# Patient Record
Sex: MALE | Race: WHITE | NOT HISPANIC OR LATINO | Employment: OTHER | URBAN - METROPOLITAN AREA
[De-identification: names, ages, dates, MRNs, and addresses within clinical notes are randomized per-mention and may not be internally consistent; named-entity substitution may affect disease eponyms.]

---

## 2017-01-09 DIAGNOSIS — C18.9 MALIGNANT NEOPLASM OF COLON, UNSPECIFIED PART OF COLON (HCC): ICD-10-CM

## 2017-01-09 DIAGNOSIS — E11.9 CONTROLLED TYPE 2 DIABETES MELLITUS WITHOUT COMPLICATION, WITHOUT LONG-TERM CURRENT USE OF INSULIN (HCC): Primary | ICD-10-CM

## 2017-01-09 DIAGNOSIS — E78.2 MIXED HYPERLIPIDEMIA: ICD-10-CM

## 2017-01-09 DIAGNOSIS — I10 ESSENTIAL HYPERTENSION: ICD-10-CM

## 2017-01-11 LAB
ALBUMIN SERPL-MCNC: 4.2 G/DL (ref 3.5–5.2)
ALBUMIN/GLOB SERPL: 1.7 G/DL
ALP SERPL-CCNC: 67 U/L (ref 39–117)
ALT SERPL-CCNC: 25 U/L (ref 1–41)
AST SERPL-CCNC: 21 U/L (ref 1–40)
BASOPHILS # BLD AUTO: 0.01 10*3/MM3 (ref 0–0.2)
BASOPHILS NFR BLD AUTO: 0.1 % (ref 0–1.5)
BILIRUB SERPL-MCNC: 0.6 MG/DL (ref 0.1–1.2)
BUN SERPL-MCNC: 27 MG/DL (ref 8–23)
BUN/CREAT SERPL: 23.3 (ref 7–25)
CALCIUM SERPL-MCNC: 9.2 MG/DL (ref 8.6–10.5)
CHLORIDE SERPL-SCNC: 105 MMOL/L (ref 98–107)
CHOLEST SERPL-MCNC: 179 MG/DL (ref 0–200)
CO2 SERPL-SCNC: 22.5 MMOL/L (ref 22–29)
CREAT SERPL-MCNC: 1.16 MG/DL (ref 0.76–1.27)
EOSINOPHIL # BLD AUTO: 0.14 10*3/MM3 (ref 0–0.7)
EOSINOPHIL NFR BLD AUTO: 1.9 % (ref 0.3–6.2)
ERYTHROCYTE [DISTWIDTH] IN BLOOD BY AUTOMATED COUNT: 13.3 % (ref 11.5–14.5)
GLOBULIN SER CALC-MCNC: 2.5 GM/DL
GLUCOSE SERPL-MCNC: 110 MG/DL (ref 65–99)
HBA1C MFR BLD: 5.9 % (ref 4.8–5.6)
HCT VFR BLD AUTO: 41.3 % (ref 40.4–52.2)
HDLC SERPL-MCNC: 43 MG/DL (ref 40–60)
HGB BLD-MCNC: 13.6 G/DL (ref 13.7–17.6)
IMM GRANULOCYTES # BLD: 0 10*3/MM3 (ref 0–0.03)
IMM GRANULOCYTES NFR BLD: 0 % (ref 0–0.5)
LDLC SERPL CALC-MCNC: 121 MG/DL (ref 0–100)
LDLC/HDLC SERPL: 2.82 {RATIO}
LYMPHOCYTES # BLD AUTO: 3.07 10*3/MM3 (ref 0.9–4.8)
LYMPHOCYTES NFR BLD AUTO: 40.8 % (ref 19.6–45.3)
MCH RBC QN AUTO: 30.7 PG (ref 27–32.7)
MCHC RBC AUTO-ENTMCNC: 32.9 G/DL (ref 32.6–36.4)
MCV RBC AUTO: 93.2 FL (ref 79.8–96.2)
MONOCYTES # BLD AUTO: 0.37 10*3/MM3 (ref 0.2–1.2)
MONOCYTES NFR BLD AUTO: 4.9 % (ref 5–12)
NEUTROPHILS # BLD AUTO: 3.93 10*3/MM3 (ref 1.9–8.1)
NEUTROPHILS NFR BLD AUTO: 52.3 % (ref 42.7–76)
PLATELET # BLD AUTO: 281 10*3/MM3 (ref 140–500)
POTASSIUM SERPL-SCNC: 4.8 MMOL/L (ref 3.5–5.2)
PROT SERPL-MCNC: 6.7 G/DL (ref 6–8.5)
RBC # BLD AUTO: 4.43 10*6/MM3 (ref 4.6–6)
SODIUM SERPL-SCNC: 143 MMOL/L (ref 136–145)
TRIGL SERPL-MCNC: 74 MG/DL (ref 0–150)
VLDLC SERPL CALC-MCNC: 14.8 MG/DL (ref 5–40)
WBC # BLD AUTO: 7.52 10*3/MM3 (ref 4.5–10.7)

## 2017-01-19 ENCOUNTER — OFFICE VISIT (OUTPATIENT)
Dept: INTERNAL MEDICINE | Facility: CLINIC | Age: 72
End: 2017-01-19

## 2017-01-19 VITALS
RESPIRATION RATE: 12 BRPM | DIASTOLIC BLOOD PRESSURE: 80 MMHG | BODY MASS INDEX: 36.45 KG/M2 | HEIGHT: 73 IN | HEART RATE: 69 BPM | WEIGHT: 275 LBS | SYSTOLIC BLOOD PRESSURE: 130 MMHG | TEMPERATURE: 98.4 F | OXYGEN SATURATION: 97 %

## 2017-01-19 DIAGNOSIS — E55.9 VITAMIN D DEFICIENCY: ICD-10-CM

## 2017-01-19 DIAGNOSIS — R01.1 MURMUR, CARDIAC: ICD-10-CM

## 2017-01-19 DIAGNOSIS — I87.2 VENOUS STASIS DERMATITIS OF BOTH LOWER EXTREMITIES: ICD-10-CM

## 2017-01-19 DIAGNOSIS — E66.01 MORBID OBESITY DUE TO EXCESS CALORIES (HCC): ICD-10-CM

## 2017-01-19 DIAGNOSIS — I10 ESSENTIAL HYPERTENSION: ICD-10-CM

## 2017-01-19 DIAGNOSIS — E11.9 CONTROLLED TYPE 2 DIABETES MELLITUS WITHOUT COMPLICATION, WITHOUT LONG-TERM CURRENT USE OF INSULIN (HCC): ICD-10-CM

## 2017-01-19 DIAGNOSIS — E78.2 MIXED HYPERLIPIDEMIA: ICD-10-CM

## 2017-01-19 DIAGNOSIS — Z00.00 HEALTHCARE MAINTENANCE: Primary | ICD-10-CM

## 2017-01-19 DIAGNOSIS — C18.9 MALIGNANT NEOPLASM OF COLON, UNSPECIFIED PART OF COLON (HCC): ICD-10-CM

## 2017-01-19 DIAGNOSIS — C44.92 SQUAMOUS CELL CARCINOMA OF SKIN: ICD-10-CM

## 2017-01-19 PROCEDURE — 99214 OFFICE O/P EST MOD 30 MIN: CPT | Performed by: INTERNAL MEDICINE

## 2017-01-19 PROCEDURE — G0009 ADMIN PNEUMOCOCCAL VACCINE: HCPCS | Performed by: INTERNAL MEDICINE

## 2017-01-19 PROCEDURE — G0439 PPPS, SUBSEQ VISIT: HCPCS | Performed by: INTERNAL MEDICINE

## 2017-01-19 PROCEDURE — 90670 PCV13 VACCINE IM: CPT | Performed by: INTERNAL MEDICINE

## 2017-01-19 NOTE — MR AVS SNAPSHOT
Dileep HUFF Alfonso   1/19/2017 1:00 PM   Office Visit    Dept Phone:  [phone]   Encounter #:  [id number]    Provider:  Juan A Rubi MD   Department:  [facility] INTERNAL MEDICINE                Your Full Care Plan              Your Updated Medication List          This list is accurate as of: 1/19/17  2:24 PM.  Always use your most recent med list.                aspirin 81 MG tablet       CoQ-10 100 MG capsule       fish oil 1000 MG capsule capsule       losartan 100 MG tablet   Commonly known as:  COZAAR   TAKE ONE TABLET BY MOUTH ONCE DAILY       MULTI VITAMIN DAILY PO       Vitamin D-3 1000 UNITS capsule               We Performed the Following     Pneumococcal Conjugate Vaccine 13-Valent All       You Were Diagnosed With        Codes Comments    Healthcare maintenance    -  Primary ICD-10-CM: Z00.00  ICD-9-CM: V70.0     Controlled type 2 diabetes mellitus without complication, without long-term current use of insulin     ICD-10-CM: E11.9  ICD-9-CM: 250.00     Mixed hyperlipidemia     ICD-10-CM: E78.2  ICD-9-CM: 272.2     Essential hypertension     ICD-10-CM: I10  ICD-9-CM: 401.9     Malignant neoplasm of colon, unspecified part of colon     ICD-10-CM: C18.9  ICD-9-CM: 153.9     Morbid obesity due to excess calories     ICD-10-CM: E66.01  ICD-9-CM: 278.01     Murmur, cardiac     ICD-10-CM: R01.1  ICD-9-CM: 785.2     Squamous cell carcinoma of skin     ICD-10-CM: C44.92  ICD-9-CM: 173.92     Venous stasis dermatitis of both lower extremities     ICD-10-CM: I83.11, I83.12  ICD-9-CM: 454.1     Vitamin D deficiency     ICD-10-CM: E55.9  ICD-9-CM: 268.9       Instructions     None    Patient Instructions History      Upcoming Appointments     Visit Type Date Time Department    SUBSEQUENT MEDICARE WELLNESS 1/19/2017  1:00 PM [facility]    [facility] 6/12/2017  9:40 AM [facility]    OFFICE VISIT 6/19/2017  8:00 AM [facility]      MyChart Signup     Our records  "indicate that your [facility] [employer] account has been deactivated. If you would like to reactivate your account, please email [email or web address] or call [phone] to talk to our [employer] staff.             Other Info from Your Visit           Your Appointments     Jun 12, 2017  9:40 AM EDT   [facility] with [facility]CAMO   [facility] INTERNAL MEDICINE (--)    [address] [facility]. [address] [place] [id number] [phone]            Jun 19, 2017  8:00 AM EDT   Office Visit with Juan A Rubi MD   [facility] INTERNAL MEDICINE (--)    [address] [facility]. [address] [place] [id number] [phone]           Arrive 15 minutes prior to appointment.              Allergies     Statins        Reason for Visit     Annual Exam review of medical issues       Vital Signs     Blood Pressure Pulse Temperature Respirations Height Weight    130/80 69 98.4 °F (36.9 °C) 12 73\" (185.4 cm) 275 lb (125 kg)    Oxygen Saturation Body Mass Index Smoking Status             97% 36.28 kg/m2 Never Smoker         Problems and Diagnoses Noted     Type 2 diabetes mellitus, controlled    Routine medical exam    High cholesterol or triglycerides    High blood pressure    Lung mass    Colon cancer    Severe obesity    Murmur, cardiac    Squamous cell skin cancer    Stasis dermatitis    Vitamin D deficiency      No Longer an Issue     History of cancer of rectum, rectosigmoid junction, and anus      Immunizations Administered     Name Date    Pneumococcal Conjugate 13-Valent         "

## 2017-01-19 NOTE — PROGRESS NOTES
"Annual Exam (review of medical issues )      HPI  Dileep Hamilton is a 71 y.o. male RTC In yearly AWV, review of medical issues:  Has been busy with wife's mother's passing in [facility].  Feels like has been struggling with weight.  Gained some weight over holidays but has \"nearly lost it all\".    1. HTN - on one drug.  Numbers at home similar to today's numbers.   2. HLD - intolerant to Pravastatin with CoQ10 and Livalo was $240/ month. Diet was off a bit over holidays, but is better now.    3. DMII with long hx obesity - has been off meds with prior diet controlled. Is at gym 3x/ week.     4. Colon CA - Stage I, s/p R colon resection in 12/2014. Had CT and labs done prior to 11/2016 visit. All stable and no f/u needed for one year.  Repeat C-scope due and pt to call Dr. Sin's office.   5. Aortic calcification - noted on ECHO..   6. SCC of skin - saw derm, 11/2016. No recurrence. F/U in one year.   7. Stasis dermatitis, mild - no sx or pain. Uses compression during day most days.  Getting \"a little bit\" of swelling.   8. Vitamin D deficiency - still on  OTC supplement.    Review of Systems   Constitution: Negative for chills, fever, malaise/fatigue and weight loss.   HENT: Negative for congestion, headaches, hearing loss, hoarse voice, odynophagia and sore throat.    Eyes: Negative for discharge, double vision, pain and redness.        Last eye exam ~9/2016; wears glasses      Cardiovascular: Positive for leg swelling (minimal at times). Negative for chest pain, dyspnea on exertion, irregular heartbeat, near-syncope, palpitations and syncope.   Respiratory: Negative for cough, shortness of breath, sleep disturbances due to breathing and snoring (in past when heavier. ).    Skin: Negative for rash and suspicious lesions.   Musculoskeletal: Negative for joint pain, joint swelling, muscle cramps and muscle weakness.   Gastrointestinal: Negative for constipation, diarrhea, dysphagia, heartburn, nausea and " vomiting.   Genitourinary: Negative for dysuria, frequency, hematuria, nocturia and urgency.   Neurological: Negative for dizziness and light-headedness.   Psychiatric/Behavioral: Negative for depression. The patient does not have insomnia and is not nervous/anxious.        Problem List:    Patient Active Problem List   Diagnosis   • Malignant neoplasm of colon   • Hypertension   • Diabetes type 2, controlled   • Vitamin D deficiency   • Hyperlipidemia   • Venous stasis dermatitis   • Morbid obesity due to excess calories   • Squamous cell carcinoma of skin   • Murmur, cardiac   • Lung mass   • Healthcare maintenance       Medical History:    Past Medical History   Diagnosis Date   • Colon cancer      T1N0 stage I, s/p partial R colon resection 2014   • History of cataract      B early 2014   • History of shingles      2013   • Hyperlipidemia    • Hypertension      dx'd 8/2014   • IFG (impaired fasting glucose)    • Leukocytosis    • Obesity    • Personal history of scoliosis      mild   • Proteinuria    • Squamous cell carcinoma      skin   • Stasis dermatitis    • Type 2 diabetes mellitus      new dx 10/2015   • Vitamin D deficiency         Social History:    Social History     Social History   • Marital status:      Spouse name: N/A   • Number of children: 1   • Years of education: N/A     Occupational History   • Retired respiratory therapist      Social History Main Topics   • Smoking status: Never Smoker   • Smokeless tobacco: Never Used   • Alcohol use No   • Drug use: No   • Sexual activity: Yes     Partners: Female      Comment: wife only; no hx STD's     Other Topics Concern   • Not on file     Social History Narrative    Diet - variable    Exercise - gym 3x/ week.         Family History:   Family History   Problem Relation Age of Onset   • Heart disease Mother    • Diabetes Mother    • Hypertension Mother    • Lung cancer Mother    • COPD Mother    • Stroke Father    • Diabetes Maternal Grandmother     • Diabetes type II Sister    • Diabetes type II Brother        Surgical History:   Past Surgical History   Procedure Laterality Date   • Vasectomy       1978   • Subtotal colectomy  12/05/2014     colon ca 2014; R side of colon only         Current Outpatient Prescriptions:   •  aspirin 81 MG tablet, Take  by mouth daily., Disp: , Rfl:   •  Cholecalciferol (VITAMIN D-3) 1000 UNITS capsule, Take  by mouth daily., Disp: , Rfl:   •  Coenzyme Q10 (COQ-10) 100 MG capsule, Take  by mouth., Disp: , Rfl:   •  losartan (COZAAR) 100 MG tablet, TAKE ONE TABLET BY MOUTH ONCE DAILY, Disp: 90 tablet, Rfl: 2  •  Multiple Vitamin (MULTI VITAMIN DAILY PO), Take  by mouth., Disp: , Rfl:   •  Omega-3 Fatty Acids (FISH OIL) 1000 MG capsule capsule, Take  by mouth., Disp: , Rfl:     Vitals:    01/19/17 1301   BP: 130/80   Pulse: 69   Resp: 12   Temp: 98.4 °F (36.9 °C)   SpO2: 97%       Physical Exam   Constitutional: He is oriented to person, place, and time. He appears well-developed and well-nourished. He is cooperative. He does not have a sickly appearance. He does not appear ill. No distress.   Obese habitus     HENT:   Head: Normocephalic and atraumatic.   Right Ear: Hearing, tympanic membrane, external ear and ear canal normal.   Left Ear: Hearing, tympanic membrane, external ear and ear canal normal.   Nose: Nose normal.   Mouth/Throat: Uvula is midline, oropharynx is clear and moist and mucous membranes are normal.   Eyes: Conjunctivae, EOM and lids are normal. Pupils are equal, round, and reactive to light.   Neck: Normal range of motion and full passive range of motion without pain. Neck supple. Carotid bruit is not present. No thyroid mass and no thyromegaly present.   Cardiovascular: Normal rate, regular rhythm, S1 normal, S2 normal and intact distal pulses.  Exam reveals no gallop and no friction rub.    Murmur (2/6 JULY RUSB) heard.  Pulses:       Radial pulses are 2+ on the right side, and 2+ on the left side.         Dorsalis pedis pulses are 2+ on the right side, and 2+ on the left side.        Posterior tibial pulses are 2+ on the right side, and 2+ on the left side.   Pulmonary/Chest: Effort normal and breath sounds normal. No respiratory distress. He has no wheezes. He has no rhonchi. He has no rales.   Abdominal: Soft. Bowel sounds are normal. He exhibits no distension and no mass. There is no hepatosplenomegaly. There is no tenderness. There is no rebound and no guarding.   Obese     Genitourinary: Rectum normal and prostate normal. Prostate is not enlarged and not tender.   Musculoskeletal: Normal range of motion. He exhibits edema (trace).    Dileep had a diabetic foot exam performed today.   During the foot exam he had a monofilament test performed (intact).    Vascular Status -  His exam exhibits right foot vasculature abnormal and no right foot edema. His exam exhibits left foot vasculature abnormal and no left foot edema.  Lymphadenopathy:     He has no cervical adenopathy.     He has no axillary adenopathy.        Right: No inguinal adenopathy present.        Left: No inguinal adenopathy present.   Neurological: He is alert and oriented to person, place, and time. He has normal strength and normal reflexes. He displays no tremor. No cranial nerve deficit or sensory deficit. He exhibits normal muscle tone. Gait normal.   Skin: Skin is warm, dry and intact. No rash noted.   Scattered moles, nevi and skin tags noted.   SK's noted on back.    Psychiatric: He has a normal mood and affect. His speech is normal and behavior is normal. Cognition and memory are normal.   Vitals reviewed.      Assessment/ Plan  Diagnoses and all orders for this visit:    Healthcare maintenance  -     Pneumococcal Conjugate Vaccine 13-Valent All    Controlled type 2 diabetes mellitus without complication, without long-term current use of insulin    Mixed hyperlipidemia    Essential hypertension    Malignant neoplasm of colon, unspecified part  of colon    Morbid obesity due to excess calories    Murmur, cardiac    Squamous cell carcinoma of skin    Venous stasis dermatitis of both lower extremities    Vitamin D deficiency        Return in about 5 months (around 6/19/2017) for Recheck.      Discussion:  Dileep Hamilton is a 71 y.o. male RTC In yearly AWV, review of medical issues:     1. HTN - controlled on one drug.  BMP OK.   2. HLD - intolerant to Pravastatin with CoQ10 and Livalo was $240/ month. We discussed indication for statin medication, but without known vascular disease is reasonable to remain off statin.  Pt agreeable and will c/w TLC diet.     3. DMII with long hx obesity - A1C improved as pt improving diet and exercise.  C/W efforts. Optho UTD. Check Umicroalb/ Cr next labs.     4. Colon CA - Stage I, s/p R colon resection in 12/2014, with CT and labs done prior to 11/2016 visit. F/U Onc one year given clear CT.  Pt to schedule C-scope for f/u with Dr. Sin.   5. Aortic calcification without stenosis - noted on ECHO 2016 with audible RUSB murmur on exam. Monitor.    6. SCC of skin - s/p derm eval 11/2016 with no recurrence.  F/U one year.   7. Stasis dermatitis, mild - c/w compression daily.    8. Vitamin D deficiency - still on  OTC supplement, trend levels next labs.   9. HM - labs d/w pt; Flu/ Td/ Pvax - UTD; Prevnar - today; Zostavax - declines due to cost; C-scope due, pt to schedule; ONEL OK, check PSA next labs; Hep C Ab (-) 2014; c/w exercise.    RTC 5 months, F labs prior

## 2017-06-09 DIAGNOSIS — I10 ESSENTIAL HYPERTENSION: ICD-10-CM

## 2017-06-09 DIAGNOSIS — E78.2 MIXED HYPERLIPIDEMIA: ICD-10-CM

## 2017-06-09 DIAGNOSIS — Z12.5 SCREENING FOR PROSTATE CANCER: ICD-10-CM

## 2017-06-09 DIAGNOSIS — E55.9 VITAMIN D DEFICIENCY: ICD-10-CM

## 2017-06-09 DIAGNOSIS — E11.9 CONTROLLED TYPE 2 DIABETES MELLITUS WITHOUT COMPLICATION, WITHOUT LONG-TERM CURRENT USE OF INSULIN (HCC): Primary | ICD-10-CM

## 2017-08-03 LAB
25(OH)D3+25(OH)D2 SERPL-MCNC: 29.9 NG/ML (ref 30–100)
ALBUMIN SERPL-MCNC: 4.4 G/DL (ref 3.5–5.2)
ALBUMIN/CREAT UR: 24 MG/G CREAT (ref 0–30)
ALBUMIN/GLOB SERPL: 1.5 G/DL
ALP SERPL-CCNC: 79 U/L (ref 39–117)
ALT SERPL-CCNC: 23 U/L (ref 1–41)
AST SERPL-CCNC: 20 U/L (ref 1–40)
BILIRUB SERPL-MCNC: 0.7 MG/DL (ref 0.1–1.2)
BUN SERPL-MCNC: 30 MG/DL (ref 8–23)
BUN/CREAT SERPL: 23.3 (ref 7–25)
CALCIUM SERPL-MCNC: 9.3 MG/DL (ref 8.6–10.5)
CHLORIDE SERPL-SCNC: 103 MMOL/L (ref 98–107)
CO2 SERPL-SCNC: 26.4 MMOL/L (ref 22–29)
CREAT SERPL-MCNC: 1.29 MG/DL (ref 0.76–1.27)
CREAT UR-MCNC: 141.7 MG/DL
GLOBULIN SER CALC-MCNC: 2.9 GM/DL
GLUCOSE SERPL-MCNC: 121 MG/DL (ref 65–99)
HBA1C MFR BLD: 6.23 % (ref 4.8–5.6)
MICROALBUMIN UR-MCNC: 34 UG/ML
POTASSIUM SERPL-SCNC: 4.7 MMOL/L (ref 3.5–5.2)
PROT SERPL-MCNC: 7.3 G/DL (ref 6–8.5)
PSA SERPL-MCNC: 1.85 NG/ML (ref 0–4)
SODIUM SERPL-SCNC: 143 MMOL/L (ref 136–145)

## 2017-08-09 ENCOUNTER — OFFICE VISIT (OUTPATIENT)
Dept: INTERNAL MEDICINE | Facility: CLINIC | Age: 72
End: 2017-08-09

## 2017-08-09 VITALS
OXYGEN SATURATION: 98 % | SYSTOLIC BLOOD PRESSURE: 130 MMHG | WEIGHT: 281 LBS | TEMPERATURE: 98.8 F | HEIGHT: 73 IN | HEART RATE: 58 BPM | BODY MASS INDEX: 37.24 KG/M2 | DIASTOLIC BLOOD PRESSURE: 72 MMHG

## 2017-08-09 DIAGNOSIS — E66.01 MORBID OBESITY DUE TO EXCESS CALORIES (HCC): ICD-10-CM

## 2017-08-09 DIAGNOSIS — E11.9 CONTROLLED TYPE 2 DIABETES MELLITUS WITHOUT COMPLICATION, WITHOUT LONG-TERM CURRENT USE OF INSULIN (HCC): ICD-10-CM

## 2017-08-09 DIAGNOSIS — C18.9 MALIGNANT NEOPLASM OF COLON, UNSPECIFIED PART OF COLON (HCC): Primary | ICD-10-CM

## 2017-08-09 DIAGNOSIS — E55.9 VITAMIN D DEFICIENCY: ICD-10-CM

## 2017-08-09 DIAGNOSIS — E78.2 MIXED HYPERLIPIDEMIA: ICD-10-CM

## 2017-08-09 DIAGNOSIS — I10 ESSENTIAL HYPERTENSION: ICD-10-CM

## 2017-08-09 PROBLEM — I35.9 AORTIC VALVE CALCIFICATION: Status: ACTIVE | Noted: 2017-08-09

## 2017-08-09 PROCEDURE — 99214 OFFICE O/P EST MOD 30 MIN: CPT | Performed by: INTERNAL MEDICINE

## 2017-08-09 NOTE — PROGRESS NOTES
"Hyperlipidemia; Hypertension; and Labs Only      HPI  Dileep Hamilton is a 72 y.o. male RTC in f/u: Has been doing well but has been to numerous funerals recently of friends, so has been a tough summer.   1. HTN -on one drug.  Getting good nubmers at home.   2. HLD - still hesitant about statin given past side effects. Really nothing has changed and is not moving toward wanting to retrial statin currently.    3. DMII with long hx obesity - has not been to the gym in a while.  Has been off diet and knows weight is up.  \"I gotta stop, I gotta go back to the gym\".  Recently bought 3 wheel recumbent bikes and has started riding with wife. Notes he and his wife \"sat down and had a good discussion\" about dietary mods and needed changes.      4. Colon CA - Stage I, s/p R colon resection in 12/2014, with CT and labs done prior to 11/2016 visit. Has not made appt yet. \"I need to call Dr. Sin\".    5. Vitamin D deficiency - still on  OTC supplement, taking about 2000 I.U. Daily at this time.       Review of Systems   Constitution: Positive for weight gain.   Cardiovascular: Negative for chest pain and dyspnea on exertion.   Respiratory: Negative for shortness of breath.    Neurological: Negative for dizziness and light-headedness.       The following portions of the patient's history were reviewed and updated as appropriate: allergies, current medications, past medical history and problem list.      Current Outpatient Prescriptions:   •  aspirin 81 MG tablet, Take  by mouth daily., Disp: , Rfl:   •  Cholecalciferol (VITAMIN D-3) 1000 UNITS capsule, Take  by mouth daily., Disp: , Rfl:   •  Coenzyme Q10 (COQ-10) 100 MG capsule, Take  by mouth., Disp: , Rfl:   •  losartan (COZAAR) 100 MG tablet, TAKE ONE TABLET BY MOUTH ONCE DAILY, Disp: 90 tablet, Rfl: 2  •  Multiple Vitamin (MULTI VITAMIN DAILY PO), Take  by mouth., Disp: , Rfl:   •  Omega-3 Fatty Acids (FISH OIL) 1000 MG capsule capsule, Take  by mouth., Disp: , Rfl: " "    Vitals:    08/09/17 0821   BP: 130/72   Pulse: 58   Temp: 98.8 °F (37.1 °C)   SpO2: 98%   Weight: 281 lb (127 kg)   Height: 73\" (185.4 cm)         Physical Exam   Constitutional: He is oriented to person, place, and time. He appears well-developed and well-nourished. No distress.   Obese habitus     HENT:   Head: Normocephalic and atraumatic.   Eyes: Pupils are equal, round, and reactive to light.   Neck: Normal range of motion. Neck supple. Carotid bruit is not present.   Cardiovascular: Normal rate, regular rhythm and intact distal pulses.    Murmur heard.   Systolic (RUSB) murmur is present with a grade of 3/6   Pulses:       Carotid pulses are 2+ on the right side, and 2+ on the left side.       Radial pulses are 2+ on the right side, and 2+ on the left side.   Pulmonary/Chest: Effort normal and breath sounds normal. No respiratory distress. He has no wheezes. He has no rales.   Musculoskeletal: He exhibits edema (trace only).   Neurological: He is alert and oriented to person, place, and time. No cranial nerve deficit. He exhibits normal muscle tone. Gait normal.   Psychiatric: He has a normal mood and affect. His behavior is normal.   Vitals reviewed.      Assessment/ Plan  Diagnoses and all orders for this visit:    Malignant neoplasm of colon, unspecified part of colon    Essential hypertension    Controlled type 2 diabetes mellitus without complication, without long-term current use of insulin    Vitamin D deficiency    Mixed hyperlipidemia    Morbid obesity due to excess calories        Return in about 3 months (around 11/9/2017) for Recheck.      Discussion:  Dileep Hamilton is a 72 y.o. male RTC in f/u:   1. HTN - controlled on one drug. BMP OK (Cr listed as elevated, but is around pt baseline range).   2. HLD - intolerant to Pravastatin with CoQ10 and Livalo was $240/ month. Pt has no known vascular disease and prefers to remain off statin at this time.    3. DMII with long hx obesity - off diet and " exercise routine, but pt is in action phase of getting back to good dietary and exercise routines.  A1C up a bit, but does not require meds at this time.  Weight loss advised. Will trend A1C in 3 months instead of 6 months given planned TLC. Umicroalb/ Cr (-) on labs.     4. Colon CA - Stage I, s/p R colon resection in 12/2014. Follows with Onc yearly.  C-scope due and pt to call today and schedule with Dr. Sin.   5. HM - C-scope due, pt to schedule; ONEL OK last exam, PSA OK on labs today.      RTC 3 months, F labs prior

## 2017-08-21 RX ORDER — LOSARTAN POTASSIUM 100 MG/1
TABLET ORAL
Qty: 90 TABLET | Refills: 2 | Status: SHIPPED | OUTPATIENT
Start: 2017-08-21 | End: 2018-06-25 | Stop reason: SDUPTHER

## 2017-11-01 DIAGNOSIS — I10 ESSENTIAL HYPERTENSION: ICD-10-CM

## 2017-11-01 DIAGNOSIS — E11.9 CONTROLLED TYPE 2 DIABETES MELLITUS WITHOUT COMPLICATION, WITHOUT LONG-TERM CURRENT USE OF INSULIN (HCC): Primary | ICD-10-CM

## 2017-12-05 ENCOUNTER — OFFICE (AMBULATORY)
Dept: URBAN - METROPOLITAN AREA CLINIC 75 | Facility: CLINIC | Age: 72
End: 2017-12-05
Payer: MEDICARE

## 2017-12-05 VITALS
HEIGHT: 72 IN | WEIGHT: 278 LBS | DIASTOLIC BLOOD PRESSURE: 92 MMHG | SYSTOLIC BLOOD PRESSURE: 150 MMHG | HEART RATE: 68 BPM

## 2017-12-05 DIAGNOSIS — C18.9 MALIGNANT NEOPLASM OF COLON, UNSPECIFIED: ICD-10-CM

## 2017-12-05 DIAGNOSIS — Z12.11 ENCOUNTER FOR SCREENING FOR MALIGNANT NEOPLASM OF COLON: ICD-10-CM

## 2017-12-05 DIAGNOSIS — Z85.038 PERSONAL HISTORY OF OTHER MALIGNANT NEOPLASM OF LARGE INTEST: ICD-10-CM

## 2017-12-05 PROCEDURE — S0285 CNSLT BEFORE SCREEN COLONOSC: HCPCS | Performed by: INTERNAL MEDICINE

## 2017-12-05 NOTE — SERVICENOTES
The above note was scribed by Rita Barraza PA-C. Dr. Leon saw this patient and examined this patient while in the office.

## 2017-12-05 NOTE — SERVICEHPINOTES
Thank you for allowing me to participate in the care of this patient. Mr. 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 He underwent right colectomy with Dr. Mckeon at that time. There was no evidence metastatic disease and he was dx as Stage I.  He is followed by Dr. Valenzuela for this. Today he is doing well without complaints.

## 2018-03-08 VITALS
SYSTOLIC BLOOD PRESSURE: 93 MMHG | HEART RATE: 56 BPM | DIASTOLIC BLOOD PRESSURE: 43 MMHG | SYSTOLIC BLOOD PRESSURE: 181 MMHG | DIASTOLIC BLOOD PRESSURE: 56 MMHG | SYSTOLIC BLOOD PRESSURE: 82 MMHG | SYSTOLIC BLOOD PRESSURE: 115 MMHG | HEART RATE: 48 BPM | SYSTOLIC BLOOD PRESSURE: 151 MMHG | WEIGHT: 270 LBS | HEART RATE: 52 BPM | OXYGEN SATURATION: 98 % | SYSTOLIC BLOOD PRESSURE: 127 MMHG | HEART RATE: 44 BPM | DIASTOLIC BLOOD PRESSURE: 63 MMHG | HEART RATE: 47 BPM | DIASTOLIC BLOOD PRESSURE: 76 MMHG | DIASTOLIC BLOOD PRESSURE: 40 MMHG | HEART RATE: 43 BPM | DIASTOLIC BLOOD PRESSURE: 61 MMHG | RESPIRATION RATE: 14 BRPM | RESPIRATION RATE: 18 BRPM | RESPIRATION RATE: 21 BRPM | DIASTOLIC BLOOD PRESSURE: 52 MMHG | DIASTOLIC BLOOD PRESSURE: 35 MMHG | TEMPERATURE: 97.4 F | RESPIRATION RATE: 16 BRPM | DIASTOLIC BLOOD PRESSURE: 48 MMHG | DIASTOLIC BLOOD PRESSURE: 84 MMHG | SYSTOLIC BLOOD PRESSURE: 96 MMHG | HEART RATE: 50 BPM | RESPIRATION RATE: 25 BRPM | RESPIRATION RATE: 13 BRPM | OXYGEN SATURATION: 99 % | HEART RATE: 45 BPM | SYSTOLIC BLOOD PRESSURE: 109 MMHG | RESPIRATION RATE: 15 BRPM | SYSTOLIC BLOOD PRESSURE: 80 MMHG | HEIGHT: 72 IN | SYSTOLIC BLOOD PRESSURE: 112 MMHG | RESPIRATION RATE: 19 BRPM | DIASTOLIC BLOOD PRESSURE: 47 MMHG | DIASTOLIC BLOOD PRESSURE: 49 MMHG | DIASTOLIC BLOOD PRESSURE: 34 MMHG | OXYGEN SATURATION: 100 % | SYSTOLIC BLOOD PRESSURE: 117 MMHG | SYSTOLIC BLOOD PRESSURE: 150 MMHG | SYSTOLIC BLOOD PRESSURE: 154 MMHG | RESPIRATION RATE: 20 BRPM | OXYGEN SATURATION: 95 % | DIASTOLIC BLOOD PRESSURE: 70 MMHG | RESPIRATION RATE: 22 BRPM | SYSTOLIC BLOOD PRESSURE: 100 MMHG | DIASTOLIC BLOOD PRESSURE: 78 MMHG | TEMPERATURE: 97.2 F | SYSTOLIC BLOOD PRESSURE: 97 MMHG

## 2018-03-09 ENCOUNTER — AMBULATORY SURGICAL CENTER (AMBULATORY)
Dept: URBAN - METROPOLITAN AREA SURGERY 17 | Facility: SURGERY | Age: 73
End: 2018-03-09
Payer: MEDICARE

## 2018-03-09 DIAGNOSIS — Z85.038 PERSONAL HISTORY OF OTHER MALIGNANT NEOPLASM OF LARGE INTEST: ICD-10-CM

## 2018-03-09 PROCEDURE — G0105 COLORECTAL SCRN; HI RISK IND: HCPCS | Performed by: INTERNAL MEDICINE

## 2018-03-09 RX ADMIN — PROPOFOL 50 MG: 10 INJECTION, EMULSION INTRAVENOUS at 13:08

## 2018-03-09 RX ADMIN — PROPOFOL 100 MG: 10 INJECTION, EMULSION INTRAVENOUS at 13:01

## 2018-03-09 RX ADMIN — PROPOFOL 75 MG: 10 INJECTION, EMULSION INTRAVENOUS at 13:25

## 2018-03-09 RX ADMIN — LIDOCAINE HYDROCHLORIDE 25 MG: 10 INJECTION, SOLUTION EPIDURAL; INFILTRATION; INTRACAUDAL; PERINEURAL at 13:01

## 2018-03-09 RX ADMIN — PROPOFOL 25 MG: 10 INJECTION, EMULSION INTRAVENOUS at 13:12

## 2018-03-21 DIAGNOSIS — E78.2 MIXED HYPERLIPIDEMIA: ICD-10-CM

## 2018-03-21 DIAGNOSIS — I10 ESSENTIAL HYPERTENSION: Primary | ICD-10-CM

## 2018-03-21 DIAGNOSIS — E11.9 CONTROLLED TYPE 2 DIABETES MELLITUS WITHOUT COMPLICATION, WITHOUT LONG-TERM CURRENT USE OF INSULIN (HCC): ICD-10-CM

## 2018-03-21 LAB
ALBUMIN SERPL-MCNC: 4.4 G/DL (ref 3.5–5.2)
ALBUMIN/GLOB SERPL: 1.5 G/DL
ALP SERPL-CCNC: 85 U/L (ref 39–117)
ALT SERPL-CCNC: 20 U/L (ref 1–41)
AST SERPL-CCNC: 17 U/L (ref 1–40)
BILIRUB SERPL-MCNC: 0.6 MG/DL (ref 0.1–1.2)
BUN SERPL-MCNC: 36 MG/DL (ref 8–23)
BUN/CREAT SERPL: 27.3 (ref 7–25)
CALCIUM SERPL-MCNC: 9.5 MG/DL (ref 8.6–10.5)
CHLORIDE SERPL-SCNC: 100 MMOL/L (ref 98–107)
CO2 SERPL-SCNC: 25.6 MMOL/L (ref 22–29)
CREAT SERPL-MCNC: 1.32 MG/DL (ref 0.76–1.27)
GFR SERPLBLD CREATININE-BSD FMLA CKD-EPI: 54 ML/MIN/1.73
GFR SERPLBLD CREATININE-BSD FMLA CKD-EPI: 65 ML/MIN/1.73
GLOBULIN SER CALC-MCNC: 3 GM/DL
GLUCOSE SERPL-MCNC: 122 MG/DL (ref 65–99)
HBA1C MFR BLD: 5.9 % (ref 4.8–5.6)
LDLC SERPL DIRECT ASSAY-MCNC: 156 MG/DL (ref 0–100)
POTASSIUM SERPL-SCNC: 4 MMOL/L (ref 3.5–5.2)
PROT SERPL-MCNC: 7.4 G/DL (ref 6–8.5)
SODIUM SERPL-SCNC: 141 MMOL/L (ref 136–145)

## 2018-03-28 ENCOUNTER — OFFICE VISIT (OUTPATIENT)
Dept: INTERNAL MEDICINE | Facility: CLINIC | Age: 73
End: 2018-03-28

## 2018-03-28 VITALS
DIASTOLIC BLOOD PRESSURE: 80 MMHG | TEMPERATURE: 98.1 F | SYSTOLIC BLOOD PRESSURE: 124 MMHG | OXYGEN SATURATION: 98 % | BODY MASS INDEX: 36.18 KG/M2 | WEIGHT: 273 LBS | HEIGHT: 73 IN | HEART RATE: 67 BPM

## 2018-03-28 DIAGNOSIS — E78.2 MIXED HYPERLIPIDEMIA: ICD-10-CM

## 2018-03-28 DIAGNOSIS — C18.9 MALIGNANT NEOPLASM OF COLON, UNSPECIFIED PART OF COLON (HCC): ICD-10-CM

## 2018-03-28 DIAGNOSIS — I35.9 AORTIC VALVE CALCIFICATION: ICD-10-CM

## 2018-03-28 DIAGNOSIS — E66.01 MORBID OBESITY DUE TO EXCESS CALORIES (HCC): ICD-10-CM

## 2018-03-28 DIAGNOSIS — Z00.00 HEALTHCARE MAINTENANCE: ICD-10-CM

## 2018-03-28 DIAGNOSIS — E11.9 CONTROLLED TYPE 2 DIABETES MELLITUS WITHOUT COMPLICATION, WITHOUT LONG-TERM CURRENT USE OF INSULIN (HCC): ICD-10-CM

## 2018-03-28 DIAGNOSIS — I10 ESSENTIAL HYPERTENSION: Primary | ICD-10-CM

## 2018-03-28 PROBLEM — B02.9 SHINGLES OUTBREAK: Status: ACTIVE | Noted: 2017-10-15

## 2018-03-28 PROBLEM — B02.9 SHINGLES OUTBREAK: Status: RESOLVED | Noted: 2017-10-15 | Resolved: 2018-03-28

## 2018-03-28 PROCEDURE — 99214 OFFICE O/P EST MOD 30 MIN: CPT | Performed by: INTERNAL MEDICINE
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
## 2018-06-26 RX ORDER — LOSARTAN POTASSIUM 100 MG/1
TABLET ORAL
Qty: 90 TABLET | Refills: 2 | Status: SHIPPED | OUTPATIENT
Start: 2018-06-26 | End: 2019-04-24 | Stop reason: SDUPTHER

## 2018-09-13 DIAGNOSIS — Z00.00 HEALTHCARE MAINTENANCE: Primary | ICD-10-CM

## 2018-09-13 DIAGNOSIS — E78.2 MIXED HYPERLIPIDEMIA: ICD-10-CM

## 2018-09-13 DIAGNOSIS — I10 ESSENTIAL HYPERTENSION: ICD-10-CM

## 2018-09-13 DIAGNOSIS — E66.01 MORBID OBESITY DUE TO EXCESS CALORIES (HCC): ICD-10-CM

## 2018-09-13 DIAGNOSIS — E55.9 VITAMIN D DEFICIENCY: ICD-10-CM

## 2018-09-13 DIAGNOSIS — E11.9 CONTROLLED TYPE 2 DIABETES MELLITUS WITHOUT COMPLICATION, WITHOUT LONG-TERM CURRENT USE OF INSULIN (HCC): ICD-10-CM

## 2018-09-13 DIAGNOSIS — Z12.5 SCREENING FOR PROSTATE CANCER: ICD-10-CM

## 2018-09-21 ENCOUNTER — OFFICE VISIT (OUTPATIENT)
Dept: INTERNAL MEDICINE | Facility: CLINIC | Age: 73
End: 2018-09-21

## 2018-09-21 VITALS
HEIGHT: 73 IN | TEMPERATURE: 98.3 F | HEART RATE: 58 BPM | BODY MASS INDEX: 34.85 KG/M2 | DIASTOLIC BLOOD PRESSURE: 88 MMHG | OXYGEN SATURATION: 98 % | WEIGHT: 263 LBS | RESPIRATION RATE: 12 BRPM | SYSTOLIC BLOOD PRESSURE: 138 MMHG

## 2018-09-21 DIAGNOSIS — E66.01 MORBID OBESITY DUE TO EXCESS CALORIES (HCC): ICD-10-CM

## 2018-09-21 DIAGNOSIS — E55.9 VITAMIN D DEFICIENCY: ICD-10-CM

## 2018-09-21 DIAGNOSIS — N30.00 ACUTE CYSTITIS WITHOUT HEMATURIA: ICD-10-CM

## 2018-09-21 DIAGNOSIS — E11.9 CONTROLLED TYPE 2 DIABETES MELLITUS WITHOUT COMPLICATION, WITHOUT LONG-TERM CURRENT USE OF INSULIN (HCC): ICD-10-CM

## 2018-09-21 DIAGNOSIS — H35.373 MACULAR PUCKER OF BOTH EYES: ICD-10-CM

## 2018-09-21 DIAGNOSIS — I35.9 AORTIC VALVE CALCIFICATION: ICD-10-CM

## 2018-09-21 DIAGNOSIS — I87.2 VENOUS STASIS DERMATITIS OF BOTH LOWER EXTREMITIES: ICD-10-CM

## 2018-09-21 DIAGNOSIS — I10 ESSENTIAL HYPERTENSION: ICD-10-CM

## 2018-09-21 DIAGNOSIS — Z00.00 HEALTHCARE MAINTENANCE: Primary | ICD-10-CM

## 2018-09-21 DIAGNOSIS — E78.2 MIXED HYPERLIPIDEMIA: ICD-10-CM

## 2018-09-21 DIAGNOSIS — C18.9 MALIGNANT NEOPLASM OF COLON, UNSPECIFIED PART OF COLON (HCC): ICD-10-CM

## 2018-09-21 DIAGNOSIS — C44.92 SQUAMOUS CELL CARCINOMA OF SKIN: ICD-10-CM

## 2018-09-21 PROCEDURE — 99214 OFFICE O/P EST MOD 30 MIN: CPT | Performed by: INTERNAL MEDICINE

## 2018-09-21 PROCEDURE — G0439 PPPS, SUBSEQ VISIT: HCPCS | Performed by: INTERNAL MEDICINE

## 2018-09-21 RX ORDER — OFLOXACIN 300 MG/1
300 TABLET, COATED ORAL EVERY 12 HOURS SCHEDULED
Qty: 14 TABLET | Refills: 0 | Status: SHIPPED | OUTPATIENT
Start: 2018-09-21 | End: 2018-09-28

## 2018-09-21 NOTE — PROGRESS NOTES
Subjective      Chief Complaint   Patient presents with   • Annual Exam     review of medical issues        HPI:  Dileep Hamilton is a 73 y.o. male RTC in yearly AWV, review of medical issues:   Has been doing well.  Planning to go to [place] upcoming to celebrate 52th anniversary.   1. HTN - on one drug. Getting good numbers at home. Getting 120-130's/ 80's.     BMP OK.   2. HLD - intolerant to Pravastatin with CoQ10 and Livalo was $240/ month.  Has declined statin in past, no change of heart on meds.  I d/w pt again today need for statin, but pt refuses despite LDL progression on labs.     3. DMII with long hx obesity -  Back on good diet and exercise routine still.  Is down 10# and noted the weight loss at home.  Off all DM meds. Was in 46 inch waist but now is in 40-42 inch pants.  Optho UTD 8/2018, no MD noted but has macular pucker. On ARB and ASA daily.     A1C improved today.  C/W efforts at weight loss.  Optho due, referral names provided.   Trend A1C  And Umicroalb/ Cr (-) on next labs.          4. Colon CA, cecum - Stage I, s/p R colon resection in 12/5/2014.  Saw Dr. Sin in 3/2018 and had C-scope clear.  Repeat in 5 years.   No indication for adjuvant chemotherapy, reviewed 11/2017 Onc note.  C-scope negative 3/2018.  CT scan annually now with C-scope due 10/2023.  5. Aortic calcification without stenosis - noted on ECHO 2016 with audible RUSB murmur on exam. No OSORIO and no chest pain. Is more active, if anything.   Monitor.    6. SCC of skin - s/p derm eval 11/2017 with no recurrence.  Sees yearly. No new lesions.  F/U one year.   7. Stasis dermatitis, mild - on compression daily even in warm weather.   8. Vitamin D deficiency - still on  OTC supplement.     The following portions of the patient's history were reviewed and updated as appropriate: allergies, current medications, past family history, past medical history, past social history, past surgical history and problem list.    Review of Systems    Constitution: Positive for weight loss (intentional). Negative for chills, fever and malaise/fatigue.   HENT: Negative for congestion, hearing loss, odynophagia and sore throat.    Eyes: Negative for discharge, double vision, pain and redness.        Last optho appt 8/2018; wears glasses     Cardiovascular: Negative for chest pain, dyspnea on exertion, irregular heartbeat, near-syncope, palpitations and syncope.   Respiratory: Negative for cough and shortness of breath.    Endocrine: Negative for polydipsia, polyphagia and polyuria.   Hematologic/Lymphatic: Negative for bleeding problem. Does not bruise/bleed easily.   Skin: Negative for rash and suspicious lesions.   Musculoskeletal: Negative for joint pain, joint swelling, muscle cramps, muscle weakness and myalgias.   Gastrointestinal: Negative for constipation, diarrhea, dysphagia, heartburn, nausea and vomiting.   Genitourinary: Negative for dysuria, frequency, genital sores, hematuria, hesitancy and incomplete emptying.        Never had UTI in past.    Neurological: Negative for dizziness, headaches and light-headedness.   Psychiatric/Behavioral: Negative for depression. The patient does not have insomnia and is not nervous/anxious.    Allergic/Immunologic: Negative for environmental allergies and persistent infections.       Patient Care Team:  Juan A Rubi MD as PCP - General  Juan A Rubi MD as PCP - Family Medicine  Juan A Rubi MD as PCP - Claims Attributed    Recent Hospitalizations: No recent hospitalization(s).    Depression Screen:   PHQ-2/PHQ-9 Depression Screening 9/21/2018   Little interest or pleasure in doing things 0   Feeling down, depressed, or hopeless 0   Trouble falling or staying asleep, or sleeping too much 0   Feeling tired or having little energy 0   Poor appetite or overeating 0   Feeling bad about yourself - or that you are a failure or have let yourself or your family down 0   Trouble concentrating on things, such as  reading the newspaper or watching television 0   Moving or speaking so slowly that other people could have noticed. Or the opposite - being so fidgety or restless that you have been moving around a lot more than usual 0   Thoughts that you would be better off dead, or of hurting yourself in some way 0   Total Score 0   If you checked off any problems, how difficult have these problems made it for you to do your work, take care of things at home, or get along with other people? Not difficult at all       Functional and Cognitive Screening:  Functional & Cognitive Status 9/21/2018   Do you have difficulty preparing food and eating? No   Do you have difficulty bathing yourself, getting dressed or grooming yourself? No   Do you have difficulty using the toilet? No   Do you have difficulty moving around from place to place? No   Do you have trouble with steps or getting out of a bed or a chair? No   In the past year have you fallen or experienced a near fall? No   Current Diet Well Balanced Diet   Dental Exam Up to date   Eye Exam Up to date   Exercise (times per week) 3 times per week   Current Exercise Activities Include Walking   Do you need help using the phone?  No   Are you deaf or do you have serious difficulty hearing?  No   Do you need help with transportation? No   Do you need help shopping? No   Do you need help preparing meals?  No   Do you need help with housework?  No   Do you need help with laundry? No   Do you need help taking your medications? No   Do you need help managing money? No   Do you ever drive or ride in a car without wearing a seat belt? No   Have you felt unusual stress, anger or loneliness in the last month? No   Who do you live with? Spouse   If you need help, do you have trouble finding someone available to you? No   Have you been bothered in the last four weeks by sexual problems? No   Do you have difficulty concentrating, remembering or making decisions? No       Does the patient have  "evidence of cognitive impairment? no    Taking ASA appropriately as indicated    Vitals:    09/21/18 0955   BP: 138/88   Pulse: 58   Resp: 12   Temp: 98.3 °F (36.8 °C)   SpO2: 98%   Weight: 119 kg (263 lb)   Height: 185.4 cm (73\")   PainSc: 0-No pain       Body mass index is 34.7 kg/m².    Visual Acuity:  No exam data present    Advanced Care Planning:  has an advance directive - a copy HAS NOT been provided. Have asked the patient to send this to us to add to record.    Objective   Physical Exam   Constitutional: He is oriented to person, place, and time. He appears well-developed and well-nourished. He is cooperative. No distress.   Obese habitus     HENT:   Head: Normocephalic and atraumatic.   Right Ear: Hearing, tympanic membrane, external ear and ear canal normal.   Left Ear: Hearing, tympanic membrane, external ear and ear canal normal.   Nose: Nose normal.   Mouth/Throat: Uvula is midline, oropharynx is clear and moist and mucous membranes are normal. No oropharyngeal exudate.   Eyes: Pupils are equal, round, and reactive to light. Conjunctivae, EOM and lids are normal. Right eye exhibits no discharge. Left eye exhibits no discharge. No scleral icterus.   Neck: Normal range of motion and full passive range of motion without pain. Neck supple. Carotid bruit is not present. No thyroid mass and no thyromegaly present.   Cardiovascular: Normal rate, regular rhythm, S1 normal and S2 normal.  Exam reveals no gallop and no friction rub.    Murmur heard.   Systolic (RUSB) murmur is present   Pulses:       Radial pulses are 2+ on the right side, and 2+ on the left side.        Dorsalis pedis pulses are 2+ on the right side, and 2+ on the left side.        Posterior tibial pulses are 2+ on the right side, and 2+ on the left side.   Pulmonary/Chest: Effort normal and breath sounds normal. No respiratory distress. He has no wheezes. He has no rhonchi. He has no rales.   Abdominal: Soft. Bowel sounds are normal. He " exhibits no distension and no mass. There is no hepatosplenomegaly. There is no tenderness. There is no rebound and no guarding.   Genitourinary: Rectum normal and prostate normal. Prostate is not enlarged and not tender.   Musculoskeletal: Normal range of motion. He exhibits no edema.     Vascular Status -  His right foot exhibits normal foot vasculature  and no edema. His left foot exhibits abnormal foot vasculature . His left foot exhibits no edema.  Skin Integrity  -  His right foot skin is intact.His left foot skin is intact..  Lymphadenopathy:     He has no cervical adenopathy.     He has no axillary adenopathy.        Right: No inguinal adenopathy present.        Left: No inguinal adenopathy present.   Neurological: He is alert and oriented to person, place, and time. He has normal strength and normal reflexes. He displays no tremor. No cranial nerve deficit or sensory deficit. He exhibits normal muscle tone. Gait normal.   Skin: Skin is warm, dry and intact. No rash noted.        Psychiatric: He has a normal mood and affect. His speech is normal and behavior is normal. Thought content normal. Cognition and memory are normal.   Vitals reviewed.      Assessment/Plan   Problems Addressed this Visit     Malignant neoplasm of colon (CMS/HCC)    Essential hypertension    Diabetes type 2, controlled (CMS/HCC)    Vitamin D deficiency    Hyperlipidemia    Venous stasis dermatitis    Morbid obesity due to excess calories (CMS/HCC)    Squamous cell carcinoma of skin    Aortic valve calcification    Macular pucker of both eyes      Other Visit Diagnoses     Healthcare maintenance    -  Primary    Acute cystitis without hematuria        Relevant Medications    ofloxacin (FLOXIN) 300 MG tablet    Other Relevant Orders    UA / M With / Rflx Culture([facility] ONLY) - Urine, Clean Catch              Diagnoses and all orders for this visit:    Healthcare maintenance    Vitamin D deficiency    Venous stasis dermatitis of both  lower extremities    Squamous cell carcinoma of skin    Morbid obesity due to excess calories (CMS/HCC)    Malignant neoplasm of colon, unspecified part of colon (CMS/HCC)    Macular pucker of both eyes    Mixed hyperlipidemia    Essential hypertension    Controlled type 2 diabetes mellitus without complication, without long-term current use of insulin (CMS/HCC)  -     Cancel: Microalbumin / Creatinine Urine Ratio - Urine, Clean Catch; Future    Aortic valve calcification    Acute cystitis without hematuria  -     ofloxacin (FLOXIN) 300 MG tablet; Take 1 tablet by mouth Every 12 (Twelve) Hours for 7 days.  -     UA / M With / Rflx Culture([facility] ONLY) - Urine, Clean Catch; Future        Dileep Hamilton is a 73 y.o. male RTC in yearly AWV, review of medical issues:     1. HTN - controlled on one drug.  BMP OK.   2. HLD - intolerant to Pravastatin with CoQ10 and Livalo was expensive, now declines all statin meds.  LDL is reasonable and, despite DM II dx, declines statins again today.  C/W diet and exercise mods.       3. DMII with long hx obesity - off all DM meds and actively loosing weight with diet and exercise (46 inch waist --> 40-42 inch pants).  C/W TLC mods.  C/W ASA and ARB daily.  Optho UTD 8/2018.      4. Colon CA, cecum - Stage I, s/p R colon resection in 12/5/2014.  C-scope clear 3/2018 with Dr. Sin --> repeat in 5 years. No indication for adjuvant chemotherapy per11/2017 Onc note.   CT scan annually now with C-scope due 10/2023.  5. Aortic calcification without stenosis - noted on ECHO 2016 with audible RUSB murmur on exam. Asx'ic.  Monitor.  Consider repeat ECHO at 3 year alessia, sooner if sx.    6. SCC of skin - s/p derm eval 11/2017 with no recurrence. F/U derm yearly.   7. Stasis dermatitis, mild - c/w compression socks daily. Weight loss advised.   8. Vitamin D deficiency - replete on daily 1000 I.U. Vitamin D3.  9. Acute cystitis - noted on labs, E.coli with sensitivities pending. Asx'ic and no  recent instrumentation. Prostate non-inflamed on exam  Will tx with oflaxacin (due to insurance formulary) and repeat urine in 7 days for clearance. If persists, will need CT imaging and cysto to further eval.   10. HM - labs d/w pt; Flu/ Td/ Pvax/ Prevnar - UTD; Hep A and Shingrix - UTD; C-scope d3/2018 (-) --> 5 years; ONEL/ PSA OK; Hep C Ab (-) 2014; c/w exercise.     RTC 6 months, F labs prior    Return in about 6 months (around 3/21/2019) for Recheck.          Current Outpatient Prescriptions:   •  aspirin 81 MG tablet, Take  by mouth daily., Disp: , Rfl:   •  Cholecalciferol (VITAMIN D-3) 1000 UNITS capsule, Take  by mouth daily., Disp: , Rfl:   •  Coenzyme Q10 (COQ-10) 100 MG capsule, Take  by mouth., Disp: , Rfl:   •  losartan (COZAAR) 100 MG tablet, TAKE ONE TABLET BY MOUTH ONCE DAILY, Disp: 90 tablet, Rfl: 2  •  Multiple Vitamin (MULTI VITAMIN DAILY PO), Take  by mouth., Disp: , Rfl:   •  Omega-3 Fatty Acids (FISH OIL) 1000 MG capsule capsule, Take  by mouth., Disp: , Rfl:   •  ofloxacin (FLOXIN) 300 MG tablet, Take 1 tablet by mouth Every 12 (Twelve) Hours for 7 days., Disp: 14 tablet, Rfl: 0

## 2018-09-21 NOTE — PATIENT INSTRUCTIONS
Shingrix (new shingles shot; 2 shot series) check at pharmacy  Hepatitis A (2 shot series) check at pharmacy    Medicare Wellness  Personal Prevention Plan of Service     Date of Office Visit:  2018  Encounter Provider:  Juan A Rubi MD  Place of Service:  [facility]  Patient Name: Dileep Hamilton  :  1945    As part of the Medicare Wellness portion of your visit today, we are providing you with this personalized preventive plan of services (PPPS). This plan is based upon recommendations of the [place] Preventive Services Task Force (USPSTF) and the Advisory Committee on Immunization Practices (ACIP).    This lists the preventive care services that should be considered, and provides dates of when you are due. Items listed as completed are up-to-date and do not require any further intervention.    Health Maintenance   Topic Date Due   • ZOSTER VACCINE (2 of 2) 2017   • URINE MICROALBUMIN  2018   • TDAP/TD VACCINES (1 - Tdap) 2024 (Originally 2014)   • HEMOGLOBIN A1C  2019   • LIPID PANEL  2019   • DIABETIC EYE EXAM  2019   • MEDICARE ANNUAL WELLNESS  2019   • DIABETIC FOOT EXAM  2019   • COLONOSCOPY  2028   • HEPATITIS C SCREENING  Completed   • INFLUENZA VACCINE  Completed   • PNEUMOCOCCAL VACCINES (65+ LOW/MEDIUM RISK)  Completed       No orders of the defined types were placed in this encounter.      Return in about 6 months (around 3/21/2019) for Recheck.

## 2018-09-22 LAB
25(OH)D3+25(OH)D2 SERPL-MCNC: 43.4 NG/ML (ref 30–100)
ALBUMIN SERPL-MCNC: 4.5 G/DL (ref 3.5–5.2)
ALBUMIN/CREAT UR: 29.7 MG/G CREAT (ref 0–30)
ALBUMIN/GLOB SERPL: 1.8 G/DL
ALP SERPL-CCNC: 88 U/L (ref 39–117)
ALT SERPL-CCNC: 20 U/L (ref 1–41)
APPEARANCE UR: ABNORMAL
AST SERPL-CCNC: 18 U/L (ref 1–40)
BACTERIA #/AREA URNS HPF: ABNORMAL /HPF
BACTERIA UR CULT: ABNORMAL
BACTERIA UR CULT: ABNORMAL
BASOPHILS # BLD AUTO: 0.02 10*3/MM3 (ref 0–0.2)
BASOPHILS NFR BLD AUTO: 0.2 % (ref 0–1.5)
BILIRUB SERPL-MCNC: 0.8 MG/DL (ref 0.1–1.2)
BILIRUB UR QL STRIP: NEGATIVE
BUN SERPL-MCNC: 34 MG/DL (ref 8–23)
BUN/CREAT SERPL: 28.8 (ref 7–25)
CALCIUM SERPL-MCNC: 9.3 MG/DL (ref 8.6–10.5)
CHLORIDE SERPL-SCNC: 102 MMOL/L (ref 98–107)
CHOLEST SERPL-MCNC: 186 MG/DL (ref 0–200)
CO2 SERPL-SCNC: 26.3 MMOL/L (ref 22–29)
COLOR UR: YELLOW
CREAT SERPL-MCNC: 1.18 MG/DL (ref 0.76–1.27)
CREAT UR-MCNC: 92.5 MG/DL
EOSINOPHIL # BLD AUTO: 0.33 10*3/MM3 (ref 0–0.7)
EOSINOPHIL NFR BLD AUTO: 3.3 % (ref 0.3–6.2)
EPI CELLS #/AREA URNS HPF: ABNORMAL /HPF
ERYTHROCYTE [DISTWIDTH] IN BLOOD BY AUTOMATED COUNT: 13.5 % (ref 11.5–14.5)
GLOBULIN SER CALC-MCNC: 2.5 GM/DL
GLUCOSE SERPL-MCNC: 111 MG/DL (ref 65–99)
GLUCOSE UR QL: NEGATIVE
HBA1C MFR BLD: 6.2 % (ref 4.8–5.6)
HCT VFR BLD AUTO: 42.8 % (ref 40.4–52.2)
HDLC SERPL-MCNC: 41 MG/DL (ref 40–60)
HGB BLD-MCNC: 13.2 G/DL (ref 13.7–17.6)
HGB UR QL STRIP: NEGATIVE
IMM GRANULOCYTES # BLD: 0 10*3/MM3 (ref 0–0.03)
IMM GRANULOCYTES NFR BLD: 0 % (ref 0–0.5)
KETONES UR QL STRIP: NEGATIVE
LDLC SERPL CALC-MCNC: 125 MG/DL (ref 0–100)
LEUKOCYTE ESTERASE UR QL STRIP: ABNORMAL
LYMPHOCYTES # BLD AUTO: 3.82 10*3/MM3 (ref 0.9–4.8)
LYMPHOCYTES NFR BLD AUTO: 37.9 % (ref 19.6–45.3)
MCH RBC QN AUTO: 29 PG (ref 27–32.7)
MCHC RBC AUTO-ENTMCNC: 30.8 G/DL (ref 32.6–36.4)
MCV RBC AUTO: 94.1 FL (ref 79.8–96.2)
MICRO URNS: ABNORMAL
MICROALBUMIN UR-MCNC: 27.5 UG/ML
MONOCYTES # BLD AUTO: 0.49 10*3/MM3 (ref 0.2–1.2)
MONOCYTES NFR BLD AUTO: 4.9 % (ref 5–12)
MUCOUS THREADS URNS QL MICRO: PRESENT /HPF
NEUTROPHILS # BLD AUTO: 5.42 10*3/MM3 (ref 1.9–8.1)
NEUTROPHILS NFR BLD AUTO: 53.7 % (ref 42.7–76)
NITRITE UR QL STRIP: POSITIVE
OTHER ANTIBIOTIC SUSC ISLT: ABNORMAL
PH UR STRIP: 5 [PH] (ref 5–7.5)
PLATELET # BLD AUTO: 304 10*3/MM3 (ref 140–500)
POTASSIUM SERPL-SCNC: 4.8 MMOL/L (ref 3.5–5.2)
PROT SERPL-MCNC: 7 G/DL (ref 6–8.5)
PROT UR QL STRIP: NEGATIVE
PSA SERPL-MCNC: 2.27 NG/ML (ref 0–4)
RBC # BLD AUTO: 4.55 10*6/MM3 (ref 4.6–6)
RBC #/AREA URNS HPF: ABNORMAL /HPF
SODIUM SERPL-SCNC: 139 MMOL/L (ref 136–145)
SP GR UR: 1.02 (ref 1–1.03)
TRIGL SERPL-MCNC: 101 MG/DL (ref 0–150)
URINALYSIS REFLEX: ABNORMAL
UROBILINOGEN UR STRIP-MCNC: 0.2 MG/DL (ref 0.2–1)
VLDLC SERPL CALC-MCNC: 20.2 MG/DL (ref 5–40)
WBC # BLD AUTO: 10.08 10*3/MM3 (ref 4.5–10.7)
WBC #/AREA URNS HPF: >30 /HPF

## 2018-09-28 ENCOUNTER — RESULTS ENCOUNTER (OUTPATIENT)
Dept: INTERNAL MEDICINE | Facility: CLINIC | Age: 73
End: 2018-09-28

## 2018-09-28 DIAGNOSIS — N30.00 ACUTE CYSTITIS WITHOUT HEMATURIA: ICD-10-CM

## 2018-10-02 LAB
APPEARANCE UR: CLEAR
BACTERIA #/AREA URNS HPF: NORMAL /HPF
BILIRUB UR QL STRIP: NEGATIVE
COLOR UR: YELLOW
EPI CELLS #/AREA URNS HPF: NORMAL /HPF
GLUCOSE UR QL: NEGATIVE
HGB UR QL STRIP: NEGATIVE
KETONES UR QL STRIP: NEGATIVE
LEUKOCYTE ESTERASE UR QL STRIP: NEGATIVE
MICRO URNS: NORMAL
MICRO URNS: NORMAL
MUCOUS THREADS URNS QL MICRO: PRESENT /HPF
NITRITE UR QL STRIP: NEGATIVE
PH UR STRIP: 5.5 [PH] (ref 5–7.5)
PROT UR QL STRIP: NEGATIVE
RBC #/AREA URNS HPF: NORMAL /HPF
SP GR UR: 1.03 (ref 1–1.03)
URINALYSIS REFLEX: NORMAL
UROBILINOGEN UR STRIP-MCNC: 1 MG/DL (ref 0.2–1)
WBC #/AREA URNS HPF: NORMAL /HPF

## 2018-10-08 ENCOUNTER — TELEPHONE (OUTPATIENT)
Dept: INTERNAL MEDICINE | Facility: CLINIC | Age: 73
End: 2018-10-08

## 2018-10-08 NOTE — TELEPHONE ENCOUNTER
Pt was seen 9/21/18 , during the office visit the two of you discussed him having eye surgery , recently he had the paper work filled out for the surgery . Will he need to be seen tohave paper filled out ? Please advise       Donald Alfonso [phone]

## 2018-10-08 NOTE — TELEPHONE ENCOUNTER
Yes, it is easier. I never now what the surgeon will require for pre-op eval and often I have to send not showing our pre-op evaluation occurred. If surgeon requires pre-op eval, cannot be done over the phone.

## 2018-10-18 ENCOUNTER — OFFICE VISIT (OUTPATIENT)
Dept: INTERNAL MEDICINE | Facility: CLINIC | Age: 73
End: 2018-10-18

## 2018-10-18 VITALS
BODY MASS INDEX: 36.18 KG/M2 | HEIGHT: 73 IN | WEIGHT: 273 LBS | SYSTOLIC BLOOD PRESSURE: 132 MMHG | OXYGEN SATURATION: 98 % | HEART RATE: 55 BPM | TEMPERATURE: 98.7 F | DIASTOLIC BLOOD PRESSURE: 88 MMHG

## 2018-10-18 DIAGNOSIS — E66.01 MORBID OBESITY DUE TO EXCESS CALORIES (HCC): ICD-10-CM

## 2018-10-18 DIAGNOSIS — H35.373 MACULAR PUCKER OF BOTH EYES: ICD-10-CM

## 2018-10-18 DIAGNOSIS — E11.9 CONTROLLED TYPE 2 DIABETES MELLITUS WITHOUT COMPLICATION, WITHOUT LONG-TERM CURRENT USE OF INSULIN (HCC): ICD-10-CM

## 2018-10-18 DIAGNOSIS — I35.9 AORTIC VALVE CALCIFICATION: ICD-10-CM

## 2018-10-18 DIAGNOSIS — I10 ESSENTIAL HYPERTENSION: ICD-10-CM

## 2018-10-18 DIAGNOSIS — Z01.818 PRE-OPERATIVE EXAMINATION: Primary | ICD-10-CM

## 2018-10-18 PROCEDURE — 93000 ELECTROCARDIOGRAM COMPLETE: CPT | Performed by: INTERNAL MEDICINE

## 2018-10-18 PROCEDURE — 99214 OFFICE O/P EST MOD 30 MIN: CPT | Performed by: INTERNAL MEDICINE

## 2018-10-18 NOTE — PROGRESS NOTES
"Pre-op Exam      HPI  Dileep Hamilton is a 73 y.o. male  RTC In /u for pre-operative exam prior to planned retinal repair/ removal of epiretinal membrane on 11/2/18.  Told needed pre-op eval. Thinks they sent paperwork over here from surgery office.  Just got back from [place] and still with a bit of jetlag.   No known cardiac issues at this time.   Notes able to walk well greater than 1/2 block or climb a flight of steps without stopping to catch breath.   Had ECHO in 2016.   No C/P or SOA.  Felt good exercising and walking while in [place].     Knows needs to stop ASA and fish oil one week prior to surgery.       Review of Systems   Constitution: Negative for chills, fever and malaise/fatigue.   Cardiovascular: Positive for leg swelling (mild after trip to [place], is getting better). Negative for chest pain, dyspnea on exertion, irregular heartbeat and palpitations.   Respiratory: Negative for shortness of breath.    Hematologic/Lymphatic: Negative for bleeding problem. Does not bruise/bleed easily.   Neurological: Negative for dizziness and light-headedness.       The following portions of the patient's history were reviewed and updated as appropriate: allergies, current medications, past medical history and problem list.      Current Outpatient Prescriptions:   •  aspirin 81 MG tablet, Take  by mouth daily., Disp: , Rfl:   •  Cholecalciferol (VITAMIN D-3) 1000 UNITS capsule, Take  by mouth daily., Disp: , Rfl:   •  Coenzyme Q10 (COQ-10) 100 MG capsule, Take  by mouth., Disp: , Rfl:   •  losartan (COZAAR) 100 MG tablet, TAKE ONE TABLET BY MOUTH ONCE DAILY, Disp: 90 tablet, Rfl: 2  •  Multiple Vitamin (MULTI VITAMIN DAILY PO), Take  by mouth., Disp: , Rfl:   •  Omega-3 Fatty Acids (FISH OIL) 1000 MG capsule capsule, Take  by mouth., Disp: , Rfl:     Vitals:    10/18/18 1452   BP: 132/88   Pulse: 55   Temp: 98.7 °F (37.1 °C)   SpO2: 98%   Weight: 124 kg (273 lb)   Height: 185.4 cm (72.99\")           Physical Exam "   Constitutional: He is oriented to person, place, and time. He appears well-developed and well-nourished. He does not have a sickly appearance. He does not appear ill. No distress.   HENT:   Head: Normocephalic and atraumatic.   Mouth/Throat: Oropharynx is clear and moist. No oropharyngeal exudate.   Eyes: Pupils are equal, round, and reactive to light.   Neck: Normal range of motion. Neck supple. Carotid bruit is not present.   Cardiovascular: Normal rate and regular rhythm.    Murmur heard.   Systolic (RUSB) murmur is present   Pulses:       Carotid pulses are 2+ on the right side, and 2+ on the left side.       Radial pulses are 2+ on the right side, and 2+ on the left side.   Pulmonary/Chest: Effort normal and breath sounds normal. No respiratory distress. He has no wheezes. He has no rales.   Musculoskeletal: He exhibits edema (trace pitting BLE; spider veins noted).   Lymphadenopathy:     He has no cervical adenopathy.   Neurological: He is alert and oriented to person, place, and time. No cranial nerve deficit.   Psychiatric: He has a normal mood and affect. His behavior is normal. Thought content normal.   Vitals reviewed.      ECG 12 Lead  Date/Time: 10/18/2018 3:48 PM  Performed by: TAIWO VILLAVICENCIO  Authorized by: TAIWO VILLAVICENCIO   Comparison: compared with previous ECG from 8/4/2014  Rhythm: sinus rhythm  Rate: normal  BPM: 51  ST Segments: ST segments normal  T Waves: T waves normal  QRS axis: normal  Clinical impression: normal ECG  Comments: QTc - 420  Indication - pre operative examination        Assessment/ Plan  Diagnoses and all orders for this visit:    Pre-operative examination  -     ECG 12 Lead    Aortic valve calcification    Controlled type 2 diabetes mellitus without complication, without long-term current use of insulin (CMS/[facility])  -     ECG 12 Lead    Essential hypertension  -     ECG 12 Lead    Macular pucker of both eyes    Morbid obesity due to excess calories (CMS/HCC)        Return for  Next scheduled follow up.      Discussion:  Dileep Hamilton is a 73 y.o. male  RTC In f/u for pre-operative exam prior to planned retinal repair/ epiretinal membrane removal on 11/2/18 with Dr. Finley.  Pt has a hx of well controlled HTN, diet managed DMII, and aortic valve calcification without stenosis (last assessed on ECHO 3/2016). No active cardiac conditions or sx otherwise currently.  Pt has METS > 4.  EKG NSR and unchanged today.  Pt is going for low CV risk procedure with ophthalmology.  According to ACC/ AHA guidelines, pt may proceed to the OR with out further cardiac work-up. Pt knows to stop ASA and fish oil/ MVI at least one week prior to procedure.

## 2019-04-24 RX ORDER — LOSARTAN POTASSIUM 100 MG/1
TABLET ORAL
Qty: 90 TABLET | Refills: 2 | Status: SHIPPED | OUTPATIENT
Start: 2019-04-24 | End: 2020-03-16

## 2019-05-29 DIAGNOSIS — E11.9 CONTROLLED TYPE 2 DIABETES MELLITUS WITHOUT COMPLICATION, WITHOUT LONG-TERM CURRENT USE OF INSULIN (HCC): Primary | ICD-10-CM

## 2019-05-29 DIAGNOSIS — I10 ESSENTIAL HYPERTENSION: ICD-10-CM

## 2019-05-29 DIAGNOSIS — E78.2 MIXED HYPERLIPIDEMIA: ICD-10-CM

## 2019-05-31 LAB
ALBUMIN SERPL-MCNC: 4.4 G/DL (ref 3.5–5.2)
ALBUMIN/GLOB SERPL: 1.6 G/DL
ALP SERPL-CCNC: 90 U/L (ref 39–117)
ALT SERPL-CCNC: 18 U/L (ref 1–41)
AST SERPL-CCNC: 14 U/L (ref 1–40)
BILIRUB SERPL-MCNC: 0.8 MG/DL (ref 0.2–1.2)
BUN SERPL-MCNC: 29 MG/DL (ref 8–23)
BUN/CREAT SERPL: 24.4 (ref 7–25)
CALCIUM SERPL-MCNC: 9.8 MG/DL (ref 8.6–10.5)
CHLORIDE SERPL-SCNC: 104 MMOL/L (ref 98–107)
CO2 SERPL-SCNC: 25.6 MMOL/L (ref 22–29)
CREAT SERPL-MCNC: 1.19 MG/DL (ref 0.76–1.27)
GLOBULIN SER CALC-MCNC: 2.7 GM/DL
GLUCOSE SERPL-MCNC: 118 MG/DL (ref 65–99)
HBA1C MFR BLD: 6.2 % (ref 4.8–5.6)
LDLC SERPL DIRECT ASSAY-MCNC: 149 MG/DL (ref 0–100)
POTASSIUM SERPL-SCNC: 4.7 MMOL/L (ref 3.5–5.2)
PROT SERPL-MCNC: 7.1 G/DL (ref 6–8.5)
SODIUM SERPL-SCNC: 141 MMOL/L (ref 136–145)

## 2019-06-05 ENCOUNTER — OFFICE VISIT (OUTPATIENT)
Dept: INTERNAL MEDICINE | Facility: CLINIC | Age: 74
End: 2019-06-05

## 2019-06-05 VITALS
OXYGEN SATURATION: 98 % | HEIGHT: 72 IN | TEMPERATURE: 98 F | BODY MASS INDEX: 36.98 KG/M2 | WEIGHT: 273 LBS | SYSTOLIC BLOOD PRESSURE: 140 MMHG | HEART RATE: 62 BPM | DIASTOLIC BLOOD PRESSURE: 80 MMHG

## 2019-06-05 DIAGNOSIS — I35.9 AORTIC VALVE CALCIFICATION: ICD-10-CM

## 2019-06-05 DIAGNOSIS — Z00.00 HEALTHCARE MAINTENANCE: ICD-10-CM

## 2019-06-05 DIAGNOSIS — E66.01 MORBID OBESITY DUE TO EXCESS CALORIES (HCC): ICD-10-CM

## 2019-06-05 DIAGNOSIS — E78.2 MIXED HYPERLIPIDEMIA: ICD-10-CM

## 2019-06-05 DIAGNOSIS — E11.9 CONTROLLED TYPE 2 DIABETES MELLITUS WITHOUT COMPLICATION, WITHOUT LONG-TERM CURRENT USE OF INSULIN (HCC): ICD-10-CM

## 2019-06-05 DIAGNOSIS — I10 ESSENTIAL HYPERTENSION: Primary | ICD-10-CM

## 2019-06-05 DIAGNOSIS — I87.2 VENOUS STASIS DERMATITIS OF BOTH LOWER EXTREMITIES: ICD-10-CM

## 2019-06-05 DIAGNOSIS — R01.1 MURMUR, CARDIAC: ICD-10-CM

## 2019-06-05 PROCEDURE — 99214 OFFICE O/P EST MOD 30 MIN: CPT | Performed by: INTERNAL MEDICINE

## 2019-06-05 NOTE — PATIENT INSTRUCTIONS
Shingrix (new shingles shot; 2 shot series) check at pharmacy  Hepatitis A (2 shot series) check at pharmacy

## 2019-06-05 NOTE — PROGRESS NOTES
"Hypertension and Diabetes      HPI  Dileep Hamilton is a 73 y.o. male RTC In f/u:   Has been doing well. Was entertaining and helping out with sister and her  a lot recently.  Pt is somewhat exhausted with that and notes that she \"likes a lot of starch\". Pt feels like he gained weight with all of that.  Has not changed diet and is now down to 1300kcal and started going to gym 3x/ week.  Got old push cart for golf and walked 9 holes this week.   All these changes are recent for pt.  Really feels more committed to this as frustrated and not getting anywhere on efforts that were intermittent. Has already lost a few pounds with changes.  At least 10 # since changes made.   1. HTN - on one drug.  Has been doing Ok at home with \"low 130's\" on top. \"I dont know why it is up today\". Took meds today.   2. HLD - intolerant to Pravastatin with CoQ10 and Livalo was expensive, now declines all statin meds.  Diet has improved as noted. Still is not looking to start a statin.        3. DMII with long hx obesity - off all DM meds and actively loosing weight with diet and exercise.  Is off all meds.  Pt is really trying hard to change things at this time. Wants to resolve A1C without meds.   4. Colon CA, cecum - Stage I, s/p R colon resection in 12/5/2014.  C-scope clear 3/2018 with Dr. Sin --> repeat in 5 years.   Has CT planned later this month.   5. HM - Hep A and Shingrix has not gotten at pharmacy yet. \"I am thinking about that\".     Review of Systems   Constitution: Positive for weight gain (but is now loosing with diet mods). Negative for chills, fever and malaise/fatigue.   Eyes: Negative for blurred vision.   Cardiovascular: Negative for chest pain, dyspnea on exertion, irregular heartbeat, near-syncope, orthopnea, palpitations and paroxysmal nocturnal dyspnea.   Respiratory: Negative for shortness of breath.    Musculoskeletal: Negative for neck pain.   Neurological: Negative for dizziness, headaches and " "light-headedness.       The following portions of the patient's history were reviewed and updated as appropriate: allergies, current medications, past medical history, past social history and problem list.      Current Outpatient Medications:   •  aspirin 81 MG tablet, Take  by mouth daily., Disp: , Rfl:   •  Cholecalciferol (VITAMIN D-3) 1000 UNITS capsule, Take  by mouth daily., Disp: , Rfl:   •  Coenzyme Q10 (COQ-10) 100 MG capsule, Take  by mouth., Disp: , Rfl:   •  losartan (COZAAR) 100 MG tablet, TAKE 1 TABLET BY MOUTH ONCE DAILY, Disp: 90 tablet, Rfl: 2  •  Multiple Vitamin (MULTI VITAMIN DAILY PO), Take  by mouth., Disp: , Rfl:   •  Omega-3 Fatty Acids (FISH OIL) 1000 MG capsule capsule, Take  by mouth., Disp: , Rfl:     Vitals:    06/05/19 0837   BP: 140/80   Pulse: 62   Temp: 98 °F (36.7 °C)   SpO2: 98%   Weight: 124 kg (273 lb)   Height: 182.9 cm (72\")         Physical Exam   Constitutional: He is oriented to person, place, and time. He appears well-developed and well-nourished. No distress.   Obese habitus     HENT:   Head: Normocephalic and atraumatic.   Mouth/Throat: Oropharynx is clear and moist. No oropharyngeal exudate.   Eyes: Pupils are equal, round, and reactive to light.   Neck: Normal range of motion. Neck supple. Carotid bruit is not present.   Cardiovascular: Normal rate and regular rhythm.   Murmur heard.   Systolic (RUSB, no radiation to carotids) murmur is present with a grade of 3/6.  Pulses:       Carotid pulses are 2+ on the right side, and 2+ on the left side.       Radial pulses are 2+ on the right side, and 2+ on the left side.   Spider veins noted over legs   Pulmonary/Chest: Effort normal and breath sounds normal. No respiratory distress. He has no wheezes. He has no rales.   Musculoskeletal: He exhibits edema (trace BLE).   Neurological: He is alert and oriented to person, place, and time. No cranial nerve deficit.   Psychiatric: He has a normal mood and affect. His behavior is " normal.   Vitals reviewed.      Assessment/ Plan  Diagnoses and all orders for this visit:    Essential hypertension    Venous stasis dermatitis of both lower extremities    Murmur, cardiac    Morbid obesity due to excess calories (CMS/HCC)    Mixed hyperlipidemia    Controlled type 2 diabetes mellitus without complication, without long-term current use of insulin (CMS/HCC)    Aortic valve calcification    Healthcare maintenance        Return in about 4 months (around 10/5/2019) for Annual physical.      Discussion:  Dileep Hamilton is a 73 y.o. male RTC In f/u:      1. HTN - borderline controlled on one drug.  Home log is OK. BMP OK. Given weight loss efforts will c/w same meds for now and see how trends with weight loss.   2. HLD - intolerant to Pravastatin with CoQ10 and Livalo was expensive, now declines all statin meds.  LDL is up but pt is making diet mods. Will trend next labs.   3. DMII with long hx obesity - off all DM meds.  Pt really gained some weight with diet changes and now is making more positive changes with 10# weight loss and exercise.    A1C is stable at this time. We set goal of resolving A1C with diet mods.  C/W ASA and ARB daily.  Optho UTD 8/2018.      4. Colon CA, cecum - Stage I, s/p R colon resection in 12/5/2014.  C-scope clear 3/2018 with Dr. Sin --> repeat in 5 years.  CT planned later this month, reviewed 12/2018 noted from Onc.   5. Aortic calcification without stenosis - noted on ECHO 2016 with audible RUSB murmur on exam.Given no OSORIO or stamina issues and no radiation of murmur to carotids, will hold on repeat ECHO at this time.  Monitor.  Consider repeat ECHOif sx or change in murmur.   6. Stasis dermatitis, mild - c/w compression socks daily. Weight loss advised.   7. HM - Hep A and Shingrix at pharmacy.      RTC 4 months CPE, F labs prio  Answers for HPI/ROS submitted by the patient on 6/3/2019   Hypertension  Onset: more than 1 year ago  Progression since onset: gradually  improving  Condition status: controlled  anxiety: No  peripheral edema: Yes  sweats: No  Agents associated with hypertension: NSAIDs  CAD risks: dyslipidemia, obesity  Compliance problems: no compliance problems

## 2020-03-16 RX ORDER — LOSARTAN POTASSIUM 100 MG/1
TABLET ORAL
Qty: 90 TABLET | Refills: 2 | Status: SHIPPED | OUTPATIENT
Start: 2020-03-16 | End: 2020-12-10

## 2020-08-12 DIAGNOSIS — Z00.00 HEALTHCARE MAINTENANCE: Primary | ICD-10-CM

## 2020-08-12 DIAGNOSIS — E66.01 MORBID OBESITY DUE TO EXCESS CALORIES (HCC): ICD-10-CM

## 2020-08-12 DIAGNOSIS — Z12.5 PROSTATE CANCER SCREENING: ICD-10-CM

## 2020-08-12 DIAGNOSIS — I10 ESSENTIAL HYPERTENSION: ICD-10-CM

## 2020-08-12 DIAGNOSIS — E11.9 CONTROLLED TYPE 2 DIABETES MELLITUS WITHOUT COMPLICATION, WITHOUT LONG-TERM CURRENT USE OF INSULIN (HCC): ICD-10-CM

## 2020-08-12 DIAGNOSIS — E55.9 VITAMIN D DEFICIENCY: ICD-10-CM

## 2020-08-12 DIAGNOSIS — Z12.5 SPECIAL SCREENING FOR MALIGNANT NEOPLASM OF PROSTATE: ICD-10-CM

## 2020-08-20 LAB
25(OH)D3+25(OH)D2 SERPL-MCNC: 54.8 NG/ML (ref 30–100)
ALBUMIN SERPL-MCNC: 4.6 G/DL (ref 3.5–5.2)
ALBUMIN/CREAT UR: 33 MG/G CREAT (ref 0–29)
ALBUMIN/GLOB SERPL: 2.1 G/DL
ALP SERPL-CCNC: 84 U/L (ref 39–117)
ALT SERPL-CCNC: 23 U/L (ref 1–41)
APPEARANCE UR: CLEAR
AST SERPL-CCNC: 19 U/L (ref 1–40)
BACTERIA #/AREA URNS HPF: NORMAL /HPF
BASOPHILS # BLD AUTO: 0.03 10*3/MM3 (ref 0–0.2)
BASOPHILS NFR BLD AUTO: 0.3 % (ref 0–1.5)
BILIRUB SERPL-MCNC: 0.8 MG/DL (ref 0–1.2)
BILIRUB UR QL STRIP: NEGATIVE
BUN SERPL-MCNC: 31 MG/DL (ref 8–23)
BUN/CREAT SERPL: 26.7 (ref 7–25)
CALCIUM SERPL-MCNC: 9.5 MG/DL (ref 8.6–10.5)
CHLORIDE SERPL-SCNC: 103 MMOL/L (ref 98–107)
CHOLEST SERPL-MCNC: 192 MG/DL (ref 0–200)
CO2 SERPL-SCNC: 23.8 MMOL/L (ref 22–29)
COLOR UR: YELLOW
CREAT SERPL-MCNC: 1.16 MG/DL (ref 0.76–1.27)
CREAT UR-MCNC: 80 MG/DL
EOSINOPHIL # BLD AUTO: 0.13 10*3/MM3 (ref 0–0.4)
EOSINOPHIL NFR BLD AUTO: 1.4 % (ref 0.3–6.2)
EPI CELLS #/AREA URNS HPF: NORMAL /HPF (ref 0–10)
ERYTHROCYTE [DISTWIDTH] IN BLOOD BY AUTOMATED COUNT: 13.1 % (ref 12.3–15.4)
GLOBULIN SER CALC-MCNC: 2.2 GM/DL
GLUCOSE SERPL-MCNC: 119 MG/DL (ref 65–99)
GLUCOSE UR QL: NEGATIVE
HBA1C MFR BLD: 6.12 % (ref 4.8–5.6)
HCT VFR BLD AUTO: 41 % (ref 37.5–51)
HDLC SERPL-MCNC: 43 MG/DL (ref 40–60)
HGB BLD-MCNC: 14.1 G/DL (ref 13–17.7)
HGB UR QL STRIP: NEGATIVE
IMM GRANULOCYTES # BLD AUTO: 0.02 10*3/MM3 (ref 0–0.05)
IMM GRANULOCYTES NFR BLD AUTO: 0.2 % (ref 0–0.5)
KETONES UR QL STRIP: NEGATIVE
LDLC SERPL CALC-MCNC: 132 MG/DL (ref 0–100)
LEUKOCYTE ESTERASE UR QL STRIP: NEGATIVE
LYMPHOCYTES # BLD AUTO: 3.46 10*3/MM3 (ref 0.7–3.1)
LYMPHOCYTES NFR BLD AUTO: 37.6 % (ref 19.6–45.3)
MCH RBC QN AUTO: 30.8 PG (ref 26.6–33)
MCHC RBC AUTO-ENTMCNC: 34.4 G/DL (ref 31.5–35.7)
MCV RBC AUTO: 89.5 FL (ref 79–97)
MICRO URNS: NORMAL
MICRO URNS: NORMAL
MICROALBUMIN UR-MCNC: 26.7 UG/ML
MONOCYTES # BLD AUTO: 0.5 10*3/MM3 (ref 0.1–0.9)
MONOCYTES NFR BLD AUTO: 5.4 % (ref 5–12)
MUCOUS THREADS URNS QL MICRO: PRESENT /HPF
NEUTROPHILS # BLD AUTO: 5.07 10*3/MM3 (ref 1.7–7)
NEUTROPHILS NFR BLD AUTO: 55.1 % (ref 42.7–76)
NITRITE UR QL STRIP: NEGATIVE
NRBC BLD AUTO-RTO: 0 /100 WBC (ref 0–0.2)
PH UR STRIP: 5.5 [PH] (ref 5–7.5)
PLATELET # BLD AUTO: 296 10*3/MM3 (ref 140–450)
POTASSIUM SERPL-SCNC: 4.7 MMOL/L (ref 3.5–5.2)
PROT SERPL-MCNC: 6.8 G/DL (ref 6–8.5)
PROT UR QL STRIP: NEGATIVE
PSA SERPL-MCNC: 2.69 NG/ML (ref 0–4)
RBC # BLD AUTO: 4.58 10*6/MM3 (ref 4.14–5.8)
RBC #/AREA URNS HPF: NORMAL /HPF (ref 0–2)
SODIUM SERPL-SCNC: 139 MMOL/L (ref 136–145)
SP GR UR: 1.02 (ref 1–1.03)
TRIGL SERPL-MCNC: 85 MG/DL (ref 0–150)
URINALYSIS REFLEX: NORMAL
UROBILINOGEN UR STRIP-MCNC: 0.2 MG/DL (ref 0.2–1)
VLDLC SERPL CALC-MCNC: 17 MG/DL
WBC # BLD AUTO: 9.21 10*3/MM3 (ref 3.4–10.8)
WBC #/AREA URNS HPF: NORMAL /HPF (ref 0–5)

## 2020-08-26 ENCOUNTER — OFFICE VISIT (OUTPATIENT)
Dept: INTERNAL MEDICINE | Facility: CLINIC | Age: 75
End: 2020-08-26

## 2020-08-26 VITALS
WEIGHT: 267 LBS | OXYGEN SATURATION: 98 % | DIASTOLIC BLOOD PRESSURE: 80 MMHG | SYSTOLIC BLOOD PRESSURE: 138 MMHG | HEIGHT: 72 IN | HEART RATE: 56 BPM | BODY MASS INDEX: 36.16 KG/M2 | TEMPERATURE: 97.6 F | RESPIRATION RATE: 12 BRPM

## 2020-08-26 DIAGNOSIS — R29.818 SUSPECTED SLEEP APNEA: ICD-10-CM

## 2020-08-26 DIAGNOSIS — E11.9 CONTROLLED TYPE 2 DIABETES MELLITUS WITHOUT COMPLICATION, WITHOUT LONG-TERM CURRENT USE OF INSULIN (HCC): ICD-10-CM

## 2020-08-26 DIAGNOSIS — E55.9 VITAMIN D DEFICIENCY: ICD-10-CM

## 2020-08-26 DIAGNOSIS — I35.9 AORTIC VALVE CALCIFICATION: ICD-10-CM

## 2020-08-26 DIAGNOSIS — C44.92 SQUAMOUS CELL CARCINOMA OF SKIN: ICD-10-CM

## 2020-08-26 DIAGNOSIS — Z00.00 HEALTHCARE MAINTENANCE: Primary | ICD-10-CM

## 2020-08-26 DIAGNOSIS — E66.01 MORBID OBESITY DUE TO EXCESS CALORIES (HCC): ICD-10-CM

## 2020-08-26 DIAGNOSIS — E78.2 MIXED HYPERLIPIDEMIA: ICD-10-CM

## 2020-08-26 DIAGNOSIS — I10 ESSENTIAL HYPERTENSION: ICD-10-CM

## 2020-08-26 DIAGNOSIS — C18.9 MALIGNANT NEOPLASM OF COLON, UNSPECIFIED PART OF COLON (HCC): ICD-10-CM

## 2020-08-26 PROCEDURE — G0439 PPPS, SUBSEQ VISIT: HCPCS | Performed by: INTERNAL MEDICINE

## 2020-08-26 PROCEDURE — 99214 OFFICE O/P EST MOD 30 MIN: CPT | Performed by: INTERNAL MEDICINE

## 2020-08-26 NOTE — PROGRESS NOTES
"Subjective     Chief Complaint   Patient presents with   • Annual Exam     review of medical issues        HPI:  Dileep Hamilton is a 75 y.o. male RTC in yearly AWV, review of medical issues: has been doing well.   Has new job working at [employer].  Health has been 'not bad'.   1. HTN - tracking pressure at home.  Getting 130's systolic.  Getting low 80's on home log diastolic. Took med today.   2. HLD - intolerant to Pravastatin with CoQ10 and Livalo was expensive, now declines all statin meds.  No supplements used right now.    3. DMII with long hx obesity - feels like weight is 'ups and downs\".  Vary by 3-4 pounds.  Has been doing liquid breakfast and lunch, but mostly fruits and vegetables.       4. Colon CA, cecum - Stage I, s/p R colon resection in 12/5/2014.  C-scope clear 3/2018 with Dr. Sin --> repeat in 5 years.  CT planned later this month, reviewed 12/2018 noted from Onc.   5. Aortic calcification without stenosis - noted on ECHO 2016 with audible RUSB murmur on exam.  No new OSORIO.  Feels good. No chest pain.   6. SCC of skin - s/p derm eval 11/2017 with no recurrence. Sees annually.   7. Vitamin D deficiency - on daily 1000 I.U. Vitamin D3.  8. HM - is not sure he wants the Tdap or Shingrix, \"I am not sure I need it, I guess\".  Had flu shot for season already at [facility].      The following portions of the patient's history were reviewed and updated as appropriate: allergies, current medications, past family history, past medical history, past social history, past surgical history and problem list.    Review of Systems   Constitution: Negative for chills, fever, malaise/fatigue and weight loss.   HENT: Negative for congestion, hearing loss, odynophagia and sore throat.    Eyes: Negative for discharge, double vision, pain and redness.        Last eye exam 6/2020; wears glasses     Cardiovascular: Negative for chest pain, dyspnea on exertion, irregular heartbeat, leg swelling, near-syncope, " "palpitations and syncope.   Respiratory: Positive for snoring. Negative for cough, shortness of breath and sleep disturbances due to breathing.         \"My wife told me to tell you I have sleep apnea. I dont know why. I could ask her\".  Agrees to see sleep med for eval if I send him.    Endocrine: Negative for polydipsia, polyphagia and polyuria.   Hematologic/Lymphatic: Negative for bleeding problem. Does not bruise/bleed easily.   Skin: Negative for rash and suspicious lesions.   Musculoskeletal: Negative for joint pain, joint swelling, muscle cramps, muscle weakness and myalgias.   Gastrointestinal: Negative for constipation, diarrhea, dysphagia, heartburn, nausea and vomiting.   Genitourinary: Negative for bladder incontinence, dysuria, frequency, hematuria and hesitancy.   Neurological: Negative for excessive daytime sleepiness, dizziness, headaches and light-headedness.   Psychiatric/Behavioral: Negative for depression. The patient does not have insomnia and is not nervous/anxious.    Allergic/Immunologic: Negative for environmental allergies and persistent infections.       Patient Care Team:  Juan A Rubi MD as PCP - General  Juan A Rubi MD as PCP - Family Medicine  Po Ambrocio MD as PCP - [facility]  Crys Colón MD as Consulting Physician (Ophthalmology)    Recent Hospitalizations: No recent hospitalization(s).    Depression Screen:   PHQ-2/PHQ-9 Depression Screening 8/26/2020   Little interest or pleasure in doing things 0   Feeling down, depressed, or hopeless 0   Trouble falling or staying asleep, or sleeping too much 0   Feeling tired or having little energy 0   Poor appetite or overeating 0   Feeling bad about yourself - or that you are a failure or have let yourself or your family down 0   Trouble concentrating on things, such as reading the newspaper or watching television 0   Moving or speaking so slowly that other people could have noticed. Or the opposite - " being so fidgety or restless that you have been moving around a lot more than usual 0   Thoughts that you would be better off dead, or of hurting yourself in some way 0   Total Score 0   If you checked off any problems, how difficult have these problems made it for you to do your work, take care of things at home, or get along with other people? Not difficult at all       Functional and Cognitive Screening:  Functional & Cognitive Status 8/26/2020   Do you have difficulty preparing food and eating? No   Do you have difficulty bathing yourself, getting dressed or grooming yourself? No   Do you have difficulty using the toilet? No   Do you have difficulty moving around from place to place? No   Do you have trouble with steps or getting out of a bed or a chair? No   Current Diet Well Balanced Diet   Dental Exam Up to date   Eye Exam Up to date   Exercise (times per week) 2 times per week   Current Exercise Activities Include Walking   Do you need help using the phone?  No   Are you deaf or do you have serious difficulty hearing?  No   Do you need help with transportation? No   Do you need help shopping? No   Do you need help preparing meals?  No   Do you need help with housework?  No   Do you need help with laundry? No   Do you need help taking your medications? No   Do you need help managing money? No   Do you ever drive or ride in a car without wearing a seat belt? No   Have you felt unusual stress, anger or loneliness in the last month? No   Who do you live with? Spouse   If you need help, do you have trouble finding someone available to you? No   Have you been bothered in the last four weeks by sexual problems? No   Do you have difficulty concentrating, remembering or making decisions? No       Does the patient have evidence of cognitive impairment? no    Does not need ASA but is currently taking (advised patient that ASA is not indicated and patient chooses to stop it)    Vitals:    08/26/20 0751   BP: 138/80    "  Pulse: 56   Resp: 12   Temp: 97.6 °F (36.4 °C)   SpO2: 98%   Weight: 121 kg (267 lb)   Height: 182.9 cm (72\")   PainSc: 0-No pain       Body mass index is 36.21 kg/m².    Visual Acuity:  No exam data present    Advanced Care Planning:  ACP discussion was held with the patient during this visit. Patient has an advance directive (not in EMR), copy requested.    Objective   Physical Exam   Constitutional: He is oriented to person, place, and time. He appears well-developed and well-nourished. He is cooperative. No distress.   Obese habitus     HENT:   Head: Normocephalic and atraumatic.   Right Ear: Hearing, tympanic membrane, external ear and ear canal normal.   Left Ear: Hearing, tympanic membrane, external ear and ear canal normal.   Nose: Nose normal.   Mouth/Throat: Uvula is midline, oropharynx is clear and moist and mucous membranes are normal. No oropharyngeal exudate.   Class II-III Mallampati     Eyes: Pupils are equal, round, and reactive to light. Conjunctivae, EOM and lids are normal. Right eye exhibits no discharge. Left eye exhibits no discharge. No scleral icterus.   Neck: Normal range of motion and full passive range of motion without pain. Neck supple. Carotid bruit is not present. No thyroid mass and no thyromegaly present.   Large circumference.    Cardiovascular: Normal rate, regular rhythm, S1 normal and S2 normal. Exam reveals no gallop and no friction rub.   Murmur (RUSB) heard.  Pulses:       Radial pulses are 2+ on the right side, and 2+ on the left side.        Dorsalis pedis pulses are 2+ on the right side, and 2+ on the left side.        Posterior tibial pulses are 2+ on the right side, and 2+ on the left side.   Pulmonary/Chest: Effort normal and breath sounds normal. No respiratory distress. He has no wheezes. He has no rhonchi. He has no rales.   Abdominal: Soft. Bowel sounds are normal. He exhibits no distension and no mass. There is no hepatosplenomegaly. There is no tenderness. There " is no rebound and no guarding.   Genitourinary: Rectum normal and prostate normal. Prostate is not enlarged and not tender.   Musculoskeletal: Normal range of motion. He exhibits edema (1+ B ankles, trace over lee).    Dileep had a diabetic foot exam performed today.  Vascular Status -  His right foot exhibits normal foot vasculature  and no edema. His left foot exhibits normal foot vasculature  and no edema.  Skin Integrity  -  His right foot skin is intact.His left foot skin is intact..  Lymphadenopathy:     He has no cervical adenopathy.     He has no axillary adenopathy.        Right: No inguinal adenopathy present.        Left: No inguinal adenopathy present.   Neurological: He is alert and oriented to person, place, and time. He has normal strength and normal reflexes. He displays no tremor. No cranial nerve deficit or sensory deficit. He exhibits normal muscle tone. Gait normal.   Skin: Skin is warm, dry and intact. No rash noted.        Psychiatric: He has a normal mood and affect. His speech is normal and behavior is normal. Thought content normal. Cognition and memory are normal.   Vitals reviewed.      Assessment/Plan   Dileep was seen today for annual exam.    Diagnoses and all orders for this visit:    Healthcare maintenance    Vitamin D deficiency  -     Cholecalciferol (VITAMIN D3) 20 MCG (800 UNIT) tablet; Take 800 Int'l Units by mouth Daily.    Essential hypertension    Aortic valve calcification    Mixed hyperlipidemia    Malignant neoplasm of colon, unspecified part of colon (CMS/HCC)    Morbid obesity due to excess calories (CMS/HCC)  -     Ambulatory Referral to Sleep Medicine    Controlled type 2 diabetes mellitus without complication, without long-term current use of insulin (CMS/[facility])  -     Ambulatory Referral to Sleep Medicine    Squamous cell carcinoma of skin    Suspected sleep apnea  -     Ambulatory Referral to Sleep Medicine            Diagnoses and all orders for this  visit:    Healthcare maintenance    Vitamin D deficiency  -     Cholecalciferol (VITAMIN D3) 20 MCG (800 UNIT) tablet; Take 800 Int'l Units by mouth Daily.    Essential hypertension    Aortic valve calcification    Mixed hyperlipidemia    Malignant neoplasm of colon, unspecified part of colon (CMS/HCC)    Morbid obesity due to excess calories (CMS/HCC)  -     Ambulatory Referral to Sleep Medicine    Controlled type 2 diabetes mellitus without complication, without long-term current use of insulin (CMS/HCC)  -     Ambulatory Referral to Sleep Medicine    Squamous cell carcinoma of skin    Suspected sleep apnea  -     Ambulatory Referral to Sleep Medicine      Dileep Hamilton is a 75 y.o. male RTC in yearly AWV, review of medical issues:   1. HTN - borderline controlled on one drug.  C/W Home log. BMP OK.   2. HLD - intolerant to Pravastatin with CoQ10 and Livalo was expensive, now declines all statin meds. LDL reasonable on labs today.   3. DMII with long hx obesity - A1C controlled off meds.  Good exercise and diet routine.  Concern for AIDEE confounding thought.  C/W ARB daily.  D/C ASA.  Optho UTD 6/2020.  Weight loss advised.   4. Colon CA, cecum - Stage I, s/p R colon resection in 12/5/2014.  C-scope clear 3/2018 with Dr. Sin --> repeat in 5 years.  CT f/u per Onc.   5. Aortic calcification without stenosis - noted on ECHO 2016 with audible RUSB murmur on exam.  Given he continues with no OSORIO or stamina issues and no radiation of murmur to carotids, will hold on repeat ECHO at this time.  Monitor.    6. Stasis dermatitis, mild - c/w compression socks daily. Weight loss advised.   7. SCC of skin - s/p derm eval 11/2019 with no recurrence. Sees annually.   8. Vitamin D deficiency - progressive replete on daily 1000 I.U. Vitamin D3., change to 800 I.U. Daily next bottle.   9. Suspected AIDEE - new mention today, hx from wife to pt.  Pt has risk factors of obesity, large neck circumference, high Mallampati.  Refer to  sleep med.   10. HM - labs d/w pt; Flu/ Td/ Pvax/ Prevnar - UTD; Hep A/ Tdap/ Shingrix - at pharmacy, counseled pt; C-scope 3/2018 (-) --> 5 years; ONEL/ PSA OK; Hep C Ab (-) 2014; c/w exercise; Preventative care plan provided to pt at end of visit            Return in about 6 months (around 2/26/2021) for Recheck.  (CMP, A1C)          Current Outpatient Medications:   •  Coenzyme Q10 (COQ-10) 100 MG capsule, Take  by mouth., Disp: , Rfl:   •  losartan (COZAAR) 100 MG tablet, Take 1 tablet by mouth once daily, Disp: 90 tablet, Rfl: 2  •  Multiple Vitamin (MULTI VITAMIN DAILY PO), Take  by mouth., Disp: , Rfl:   •  Omega-3 Fatty Acids (FISH OIL) 1000 MG capsule capsule, Take  by mouth., Disp: , Rfl:   •  Cholecalciferol (VITAMIN D3) 20 MCG (800 UNIT) tablet, Take 800 Int'l Units by mouth Daily., Disp: 75 tablet, Rfl:  Answers for HPI/ROS submitted by the patient on 8/19/2020   What is the primary reason for your visit?: Physical

## 2020-08-26 NOTE — PATIENT INSTRUCTIONS
Shingrix (new shingles shot; 2 shot series) check at pharmacy  Hepatitis A (2 shot series)  Tdap (tetanus with whooping cough booster) at pharmacy      Medicare Wellness  Personal Prevention Plan of Service     Date of Office Visit:  2020  Encounter Provider:  Juan A Rubi MD  Place of Service:  [facility] INTERNAL MEDICINE  Patient Name: Dileep Hamilton  :  1945    As part of the Medicare Wellness portion of your visit today, we are providing you with this personalized preventive plan of services (PPPS). This plan is based upon recommendations of the [place] Preventive Services Task Force (USPSTF) and the Advisory Committee on Immunization Practices (ACIP).    This lists the preventive care services that should be considered, and provides dates of when you are due. Items listed as completed are up-to-date and do not require any further intervention.    Health Maintenance   Topic Date Due   • ZOSTER VACCINE (2 of 2) 2017   • TDAP/TD VACCINES (1 - Tdap) 2024 (Originally 1956)   • HEMOGLOBIN A1C  2021   • DIABETIC EYE EXAM  2021   • LIPID PANEL  2021   • URINE MICROALBUMIN  2021   • MEDICARE ANNUAL WELLNESS  2021   • DIABETIC FOOT EXAM  2021   • COLONOSCOPY  2028   • HEPATITIS C SCREENING  Completed   • Pneumococcal Vaccine Once at 65 Years Old  Completed   • INFLUENZA VACCINE  Completed       Orders Placed This Encounter   Procedures   • Ambulatory Referral to Sleep Medicine     Referral Priority:   Routine     Referral Type:   Consultation     Referral Reason:   Specialty Services Required     Requested Specialty:   Sleep Medicine     Number of Visits Requested:   1       Return in about 6 months (around 2021) for Recheck.

## 2020-10-01 ENCOUNTER — APPOINTMENT (OUTPATIENT)
Dept: SLEEP MEDICINE | Facility: HOSPITAL | Age: 75
End: 2020-10-01

## 2020-12-10 RX ORDER — LOSARTAN POTASSIUM 100 MG/1
TABLET ORAL
Qty: 90 TABLET | Refills: 2 | Status: SHIPPED | OUTPATIENT
Start: 2020-12-10 | End: 2021-11-01

## 2021-02-24 DIAGNOSIS — E11.9 CONTROLLED TYPE 2 DIABETES MELLITUS WITHOUT COMPLICATION, WITHOUT LONG-TERM CURRENT USE OF INSULIN (HCC): Primary | ICD-10-CM

## 2021-02-24 DIAGNOSIS — I10 ESSENTIAL HYPERTENSION: ICD-10-CM

## 2021-03-09 DIAGNOSIS — Z23 IMMUNIZATION DUE: ICD-10-CM

## 2021-08-25 DIAGNOSIS — E11.9 CONTROLLED TYPE 2 DIABETES MELLITUS WITHOUT COMPLICATION, WITHOUT LONG-TERM CURRENT USE OF INSULIN (HCC): Primary | ICD-10-CM

## 2021-08-25 DIAGNOSIS — I10 ESSENTIAL HYPERTENSION: ICD-10-CM

## 2021-08-30 LAB
ALBUMIN SERPL-MCNC: 4.5 G/DL (ref 3.5–5.2)
ALBUMIN/GLOB SERPL: 1.8 G/DL
ALP SERPL-CCNC: 87 U/L (ref 39–117)
ALT SERPL-CCNC: 16 U/L (ref 1–41)
AST SERPL-CCNC: 19 U/L (ref 1–40)
BILIRUB SERPL-MCNC: 0.5 MG/DL (ref 0–1.2)
BUN SERPL-MCNC: 32 MG/DL (ref 8–23)
BUN/CREAT SERPL: 28.3 (ref 7–25)
CALCIUM SERPL-MCNC: 9.3 MG/DL (ref 8.6–10.5)
CHLORIDE SERPL-SCNC: 104 MMOL/L (ref 98–107)
CO2 SERPL-SCNC: 24.7 MMOL/L (ref 22–29)
CREAT SERPL-MCNC: 1.13 MG/DL (ref 0.76–1.27)
GLOBULIN SER CALC-MCNC: 2.5 GM/DL
GLUCOSE SERPL-MCNC: 116 MG/DL (ref 65–99)
HBA1C MFR BLD: 6.1 % (ref 4.8–5.6)
POTASSIUM SERPL-SCNC: 5.1 MMOL/L (ref 3.5–5.2)
PROT SERPL-MCNC: 7 G/DL (ref 6–8.5)
SODIUM SERPL-SCNC: 140 MMOL/L (ref 136–145)

## 2021-09-13 ENCOUNTER — OFFICE VISIT (OUTPATIENT)
Dept: INTERNAL MEDICINE | Facility: CLINIC | Age: 76
End: 2021-09-13

## 2021-09-13 VITALS
DIASTOLIC BLOOD PRESSURE: 80 MMHG | WEIGHT: 261 LBS | TEMPERATURE: 96.6 F | OXYGEN SATURATION: 97 % | HEIGHT: 72 IN | SYSTOLIC BLOOD PRESSURE: 130 MMHG | BODY MASS INDEX: 35.35 KG/M2 | HEART RATE: 60 BPM

## 2021-09-13 DIAGNOSIS — E66.01 MORBID OBESITY DUE TO EXCESS CALORIES (HCC): ICD-10-CM

## 2021-09-13 DIAGNOSIS — E11.9 CONTROLLED TYPE 2 DIABETES MELLITUS WITHOUT COMPLICATION, WITHOUT LONG-TERM CURRENT USE OF INSULIN (HCC): Primary | ICD-10-CM

## 2021-09-13 DIAGNOSIS — Z00.00 HEALTHCARE MAINTENANCE: ICD-10-CM

## 2021-09-13 DIAGNOSIS — R29.818 SUSPECTED SLEEP APNEA: ICD-10-CM

## 2021-09-13 DIAGNOSIS — I10 ESSENTIAL HYPERTENSION: ICD-10-CM

## 2021-09-13 PROCEDURE — 99214 OFFICE O/P EST MOD 30 MIN: CPT | Performed by: INTERNAL MEDICINE

## 2021-09-13 NOTE — PROGRESS NOTES
"Hypertension and Diabetes      HPI  Dileep Hamilton is a 76 y.o. male RTC in f/u:  Has been doing well overall.  Health has been OK.   1. HTN - borderline controlled on one drug. Getting \"around 130's\" and on bottom around '80's\" .  Really stable within a couple points.   2. DMII with long hx obesity - has been working on exercise doing some band workouts at home.  Has bike at home.  Getting active 2x/ week.  Is working at job long days 4 days/ week.  Short staffed at course. \"I have tweaked my breakfast a little. Having a protein drink in morning\".  Eating yogurt and fruit at lunch with protein bar.  Trying to watch carb intake.   3. Suspected AIDEE - new mention last visit. 'I cancelled it'.  Pt notes that he had experience with CPAP and 'it drove me wild'. Feels like just cannot stand it.   4. HM - had both COVID shots;   Did not ask about Shingrix at pharmacy, worried about that one and concern for side effects. \"It has not been out that long\".     Review of Systems   Constitutional: Negative for chills and fever.   Cardiovascular: Negative for chest pain, dyspnea on exertion, irregular heartbeat, near-syncope, palpitations and syncope.   Respiratory: Negative for shortness of breath and snoring (wife says not snoring any longer.  ).    Neurological: Negative for dizziness, headaches and light-headedness.   Psychiatric/Behavioral: Negative for depression. The patient is not nervous/anxious.        The following portions of the patient's history were reviewed and updated as appropriate: allergies, current medications, past medical history, past social history and problem list.      Current Outpatient Medications:   •  Cholecalciferol (VITAMIN D3) 20 MCG (800 UNIT) tablet, Take 800 Int'l Units by mouth Daily., Disp: 75 tablet, Rfl:   •  Coenzyme Q10 (COQ-10) 100 MG capsule, Take  by mouth., Disp: , Rfl:   •  losartan (COZAAR) 100 MG tablet, Take 1 tablet by mouth once daily, Disp: 90 tablet, Rfl: 2  •  Multiple " "Vitamin (MULTI VITAMIN DAILY PO), Take  by mouth., Disp: , Rfl:   •  Omega-3 Fatty Acids (FISH OIL) 1000 MG capsule capsule, Take  by mouth., Disp: , Rfl:     Vitals:    09/13/21 0750   BP: 130/80   Pulse: 60   Temp: 96.6 °F (35.9 °C)   SpO2: 97%   Weight: 118 kg (261 lb)   Height: 182.9 cm (72\")     Body mass index is 35.4 kg/m².      Physical Exam  Vitals reviewed.   Constitutional:       General: He is not in acute distress.     Appearance: He is well-developed. He is not ill-appearing or toxic-appearing.   HENT:      Head: Normocephalic and atraumatic.      Mouth/Throat:      Mouth: Mucous membranes are moist. No oral lesions.      Tongue: No lesions.      Pharynx: No pharyngeal swelling, oropharyngeal exudate, posterior oropharyngeal erythema or uvula swelling.      Comments: Class III Mallampati    Eyes:      General: No scleral icterus.     Conjunctiva/sclera: Conjunctivae normal.      Pupils: Pupils are equal, round, and reactive to light.   Neck:      Vascular: No carotid bruit.   Cardiovascular:      Rate and Rhythm: Normal rate and regular rhythm.      Pulses:           Carotid pulses are 2+ on the right side and 2+ on the left side.       Radial pulses are 2+ on the right side and 2+ on the left side.      Heart sounds: Murmur heard.     Pulmonary:      Effort: Pulmonary effort is normal. No respiratory distress.      Breath sounds: Normal breath sounds. No wheezing, rhonchi or rales.   Abdominal:      General: Abdomen is protuberant.   Musculoskeletal:      Cervical back: Normal range of motion and neck supple. No muscular tenderness.      Right lower leg: Edema (trace at ankle) present.      Left lower leg: Edema (trace at ankle) present.   Lymphadenopathy:      Cervical: No cervical adenopathy.   Skin:     Comments: Scattered hemosiderin deposition on B lower legs. Fine superficial desquamation/ scaling noted on legs.      Neurological:      Mental Status: He is alert and oriented to person, place, " and time.      Cranial Nerves: No cranial nerve deficit.      Gait: Gait normal.   Psychiatric:         Attention and Perception: Attention normal.         Mood and Affect: Mood and affect normal.         Behavior: Behavior normal.         Thought Content: Thought content normal.         Assessment/ Plan  Diagnoses and all orders for this visit:    Controlled type 2 diabetes mellitus without complication, without long-term current use of insulin (CMS/HCC)    Essential hypertension    Morbid obesity due to excess calories (CMS/[facility])  -     Ambulatory Referral to Sleep Medicine    Healthcare maintenance    Suspected sleep apnea  -     Ambulatory Referral to Sleep Medicine        Return in about 6 months (around 3/13/2022) for Medicare Wellness.      Discussion:  Dileep Hamilton is a 76 y.o. male RTC in f/u:    1. HTN - borderline controlled on one drug. C/W Home log. BMP OK.   2. HLD - intolerant to Pravastatin with CoQ10 and Livalo was expensive, now declines all statin meds. LDL reasonable on labs today.   3. DMII with long hx obesity - working on diet and exercise mods.  A1C and weight are stable.  D/W pt need for additional exercise sessions. D/W pt that some of changes in diet may be giving him more simple sugars than needed (fruit addition, 'protein bars', yogurts).  Weight loss advised.   4. Suspected AIDEE - giving different hx today noting wife says he does not snore. However, pt admits marked personal objection to CPAP mask having tried it on in school in past. I d/w pt that we cannot operate in ignorance of the situation here and need to at least test, then discuss any needed tx or concerns. PT hesitantly agrees to sleep study referral.   5. HM - COVID UTD;    reviewed with pt Aldogrix today and reassured on long term data (~10 years on shot) and risk vs. Benefit.     RTC 6 months AWV, F labs prior

## 2021-10-25 ENCOUNTER — OFFICE VISIT (OUTPATIENT)
Dept: INTERNAL MEDICINE | Facility: CLINIC | Age: 76
End: 2021-10-25

## 2021-10-25 VITALS
BODY MASS INDEX: 36.03 KG/M2 | DIASTOLIC BLOOD PRESSURE: 90 MMHG | TEMPERATURE: 96.6 F | HEIGHT: 72 IN | WEIGHT: 266 LBS | OXYGEN SATURATION: 96 % | SYSTOLIC BLOOD PRESSURE: 128 MMHG | HEART RATE: 58 BPM

## 2021-10-25 DIAGNOSIS — M67.952 TENDINOPATHY OF LEFT GLUTEAL REGION: ICD-10-CM

## 2021-10-25 DIAGNOSIS — M16.0 PRIMARY OSTEOARTHRITIS OF BOTH HIPS: ICD-10-CM

## 2021-10-25 DIAGNOSIS — M70.62 TROCHANTERIC BURSITIS OF LEFT HIP: Primary | ICD-10-CM

## 2021-10-25 PROCEDURE — 99214 OFFICE O/P EST MOD 30 MIN: CPT | Performed by: INTERNAL MEDICINE

## 2021-10-25 NOTE — PROGRESS NOTES
"Hip Pain (left )      HPI  Dileep Hamilton is a 76 y.o. male RTC in acute care:   \"I have real bad hip pain on the outside of this left hip'.  Has been dealing with this for last 3 weeks. Started as 'a little sore' and has progressed from there to severe pain.  Cannot work and cut yard.  Walking is really challenging.  No swelling or skin changes noted.  Hurts to push over area.  Hurts to lay on it.  Cannot sleep on L side now.  No loss of ROM.  NO falls and no trauma to hip noted.  NO extended exercise event prior to sx onset.   Saw [facility] on 10/14/21 and had XR. Given Voltraen 75mg pills and did not help.   This has never happened in past.  L hip has not been issue in past.   No associated back pain.     Review of Systems   Constitutional: Negative for chills and fever.   Skin: Negative for rash and suspicious lesions.   Musculoskeletal: Positive for joint pain (L hip). Negative for back pain, falls, joint swelling and muscle weakness.   Neurological: Negative for focal weakness.       The following portions of the patient's history were reviewed and updated as appropriate: allergies, current medications, past medical history, past social history and problem list.      Current Outpatient Medications:   •  Cholecalciferol (VITAMIN D3) 20 MCG (800 UNIT) tablet, Take 800 Int'l Units by mouth Daily., Disp: 75 tablet, Rfl:   •  Coenzyme Q10 (COQ-10) 100 MG capsule, Take  by mouth., Disp: , Rfl:   •  losartan (COZAAR) 100 MG tablet, Take 1 tablet by mouth once daily, Disp: 90 tablet, Rfl: 2  •  Multiple Vitamin (MULTI VITAMIN DAILY PO), Take  by mouth., Disp: , Rfl:   •  Omega-3 Fatty Acids (FISH OIL) 1000 MG capsule capsule, Take  by mouth., Disp: , Rfl:     Vitals:    10/25/21 0859   BP: 128/90   Pulse: 58   Temp: 96.6 °F (35.9 °C)   SpO2: 96%   Weight: 121 kg (266 lb)   Height: 182.9 cm (72\")     Body mass index is 36.08 kg/m².      Physical Exam  Vitals reviewed.   Constitutional:       General: He is not in acute " distress.     Appearance: He is well-developed. He is not ill-appearing or toxic-appearing.   HENT:      Head: Normocephalic.   Pulmonary:      Effort: Pulmonary effort is normal. No respiratory distress.   Abdominal:      General: Abdomen is flat.      Palpations: Abdomen is soft.   Musculoskeletal:      Lumbar back: No swelling, deformity, tenderness or bony tenderness. Normal range of motion. Negative right straight leg raise test and negative left straight leg raise test.      Right hip: No crepitus. Normal range of motion. Normal strength.      Left hip: Tenderness (focal over L trochanteric bursa.  ) present. No bony tenderness (no TTP over hamstring insertions) or crepitus. Normal range of motion. Normal strength.      Right lower leg: No tenderness or bony tenderness. No edema.      Left lower leg: No tenderness or bony tenderness. No edema.   Neurological:      Mental Status: He is alert and oriented to person, place, and time.      Motor: No weakness, atrophy or abnormal muscle tone.      Gait: Gait normal.      Deep Tendon Reflexes:      Reflex Scores:       Patellar reflexes are 2+ on the right side and 2+ on the left side.       Achilles reflexes are 2+ on the right side and 2+ on the left side.  Psychiatric:         Attention and Perception: Attention normal.         Behavior: Behavior normal.         Thought Content: Thought content normal.         Assessment/ Plan  Diagnoses and all orders for this visit:    Trochanteric bursitis of left hip  -     Ambulatory Referral to Sports Medicine    Tendinopathy of left gluteal region  -     Ambulatory Referral to Sports Medicine    Primary osteoarthritis of both hips        Return for Next scheduled follow up.      Discussion:  Dileep Hamilton is a 76 y.o. male with recent dx of B hip OA on XR at [facility] (report reviewed today) RTC in acute care (new issue to examiner) with 3 weeks of severe L lateral hip pain not responsive to oral NSAID trial from [facility].  Pt has  focal TTP over L trochanteric bursa and gives hx of severe pain focally at site with walking, markedly limited in walking at this time.  Exam and hx are compatible with  L trochanteric bursitis and gluteus tendonitis on L.  Counseled on dx. Will hold on oral steroids, after d/w pt about side effect profile and efficacy in this situation, and will ask sports med to see ASAP for assessment and consideration of injx based therapy +/- PT.  Referral placed.    RTC as planned.

## 2021-10-28 ENCOUNTER — TELEPHONE (OUTPATIENT)
Dept: INTERNAL MEDICINE | Facility: CLINIC | Age: 76
End: 2021-10-28

## 2021-10-28 RX ORDER — METHYLPREDNISOLONE 4 MG/1
TABLET ORAL
Qty: 21 EACH | Refills: 0 | Status: SHIPPED | OUTPATIENT
Start: 2021-10-28 | End: 2021-11-10

## 2021-11-01 ENCOUNTER — OFFICE VISIT (OUTPATIENT)
Dept: SLEEP MEDICINE | Facility: HOSPITAL | Age: 76
End: 2021-11-01

## 2021-11-01 VITALS
SYSTOLIC BLOOD PRESSURE: 170 MMHG | HEIGHT: 72 IN | BODY MASS INDEX: 36.7 KG/M2 | OXYGEN SATURATION: 100 % | WEIGHT: 271 LBS | HEART RATE: 53 BPM | DIASTOLIC BLOOD PRESSURE: 79 MMHG

## 2021-11-01 DIAGNOSIS — R29.818 SUSPECTED SLEEP APNEA: ICD-10-CM

## 2021-11-01 DIAGNOSIS — E66.01 MORBID OBESITY DUE TO EXCESS CALORIES (HCC): ICD-10-CM

## 2021-11-01 PROCEDURE — G0463 HOSPITAL OUTPT CLINIC VISIT: HCPCS

## 2021-11-01 RX ORDER — LOSARTAN POTASSIUM 100 MG/1
TABLET ORAL
Qty: 90 TABLET | Refills: 0 | Status: SHIPPED | OUTPATIENT
Start: 2021-11-01 | End: 2022-01-31

## 2021-11-01 NOTE — PROGRESS NOTES
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     He reported snoring which was louder when he was 30-40 lb heavier.  He currently does not snore per his reports and per his wife's reports according to the prior records.    He denies apnea, choking, gasping for breath.  He denies RLS symptoms.  He feels like he sleeps well.    Sleep schedule:  -Bedtime: 11 PM  -Sleep latency: Few min  -Wake up time: 6 AM , does feel refreshed  -Nocturnal awakening: couple of times because of his wife getting out of bed.  No difficulties going back to sleep.  -Perceived sleep hours: 7      ESS: Total score: 2     He exercises by [employer] and he also works 3 days a week.  He stated that he has lost about 30 pounds over the last 5 years which improved his snoring significantly.    He was evaluated by Dr. Rubi in September and was referred here for sleep apnea evaluation especially in the setting of comorbid DM and HTN.  He takes 1 BP medication.  His diabetes is controlled with diet.    Review of Systems  Constitutional: No fever or chills. No changes in appetite.   ENMT: No sinus congestion, postnasal drip, hoarsness  Cardiovascular: No chest pain, palpitation or legs swelling.    Respiratory: No dyspnea, cough or wheezing.  Gastrointestinal: No constipation, diarrhea, abdominal pain or acid reflux  Neurology: No headache, weakness, numbness or dizziness.   Musculoskeletal: No joints pain, stiffness or swelling.   Psychiatry: No depression, anxiety or irritability.   Hem/Lymphatic: No swollen glands or easy  bruising.  Integumentary: No rash.  Endocrinology: No excessive thirst, cold or warm intolerance.   Urinary: No dysuria, bloody urine or frequent urination.     History  Past Medical History:   Diagnosis Date   • Colon cancer (HCC)     T1N0 stage I, s/p partial R colon resection 2014   • History of cataract     B early 2014   • History of shingles     2013   • Hyperlipidemia    • Hypertension     dx'd 8/2014   • IFG (impaired fasting glucose)    • Leukocytosis    • Lung mass 11/12/2015    chronic per pt, distant imaging    • Obesity    • Personal history of scoliosis     mild   • Proteinuria    • Shingles outbreak 10/15/2017   • Squamous cell carcinoma     skin   • Stasis dermatitis    • Type 2 diabetes mellitus (HCC)     new dx 10/2015   • Vitamin D deficiency    ,   Past Surgical History:   Procedure Laterality Date   • CATARACT EXTRACTION Right 05/2020   • SUBTOTAL COLECTOMY  12/05/2014    colon ca 2014; R side of colon only   • VASECTOMY      1978   ,   Family History   Problem Relation Age of Onset   • Heart disease Mother    • Diabetes Mother    • Hypertension Mother    • Lung cancer Mother    • COPD Mother    • Stroke Father    • Diabetes Maternal Grandmother    • Diabetes type II Sister    • Diabetes type II Brother    • No Known Problems Son    ,   Social History     Socioeconomic History   • Marital status:    • Number of children: 1   Tobacco Use   • Smoking status: Never Smoker   • Smokeless tobacco: Never Used   Substance and Sexual Activity   • Alcohol use: No   • Drug use: No   • Sexual activity: Yes     Partners: Female     Comment: wife only; no hx STD's     E-cigarette/Vaping     E-cigarette/Vaping Substances     E-cigarette/Vaping Devices        and Allergies:  Statins    Medications:    Current Outpatient Medications:   •  Cholecalciferol (VITAMIN D3) 20 MCG (800 UNIT) tablet, Take 800 Int'l Units by mouth Daily., Disp: 75 tablet, Rfl:   •  Coenzyme Q10 (COQ-10) 100 MG capsule, Take  by  "mouth., Disp: , Rfl:   •  losartan (COZAAR) 100 MG tablet, Take 1 tablet by mouth once daily, Disp: 90 tablet, Rfl: 0  •  methylPREDNISolone (MEDROL) 4 MG dose pack, Take as directed on package instructions., Disp: 21 each, Rfl: 0  •  Multiple Vitamin (MULTI VITAMIN DAILY PO), Take  by mouth., Disp: , Rfl:   •  Omega-3 Fatty Acids (FISH OIL) 1000 MG capsule capsule, Take  by mouth., Disp: , Rfl:       Objective   Vital Signs:  Vitals:    11/01/21 1300   BP: 170/79   Pulse: 53   SpO2: 100%   Weight: 123 kg (271 lb)   Height: 182.9 cm (72\")     Body mass index is 36.75 kg/m².  Neck Circumference: 17 inches     Physical Exam:  Neck Circumference: 17 inches     Constitutional: Not in acute distress.  Eyes: Injected conjunctiva, EOMI. pupils equal reactive to light.  ENMT: Griffith score 3. Mallampati score 2. No oral thrush. Tonsils grade 1.  Elongated uvula.. Narrow distance in between the posterior pharyngeal pillars.  Slightly scalloped tongue .  Neck: Large. No thyromegaly.  Trachea midline.  Heart: Regular rhythm and rate.  Soft diastolic murmur over the left sternal border.  Lungs: Good and equal air entry bilaterally. No crackles or wheezing.  Nonlabored breathing.       Abdomen: Obese.  Soft.  No tenderness.  Positive bowel sounds.  Extremities: No cyanosis, clubbing or pitting edema.  Warm extremities and well-perfused.  Neuro: Conscious, alert, oriented x3.  Gait is normal.  Strength 5/5 in arms and legs.  Psych: Appropriate mood and affect.    Integumentary: No rash.  Normal skin turgor.  Lymphatic: No palpable cervical or supraclavicular lymph nodes.    Diagnostic data:  Labs: HbA1C 8/30/21: 6.1; serum bicarb 28    Assessment   1. Snoring  2. Obesity, BMI 36    Comorbid conditions:  · HTN  · DM type II      Plan:  Discussed sleep apnea.  Since he does not snore much now and does not have any other symptoms of sleep apnea then his pretest probability is low.  In addition, he does not wish to get treated with " CPAP and therefore there is no need for testing.  Of note, he does have upper airway anatomy which is suggestive of sleep apnea but again due to lack of symptoms, that will make the condition unlikely.    We discussed increasing exercise and establishing a diet program in effort to lose weight.        Naye Alexis MD  11/01/21  13:08 EDT    This note was dictated utilizing Dragon dictation

## 2021-11-10 ENCOUNTER — OFFICE VISIT (OUTPATIENT)
Dept: SPORTS MEDICINE | Facility: CLINIC | Age: 76
End: 2021-11-10

## 2021-11-10 VITALS
HEIGHT: 72 IN | TEMPERATURE: 96.9 F | WEIGHT: 273 LBS | SYSTOLIC BLOOD PRESSURE: 162 MMHG | OXYGEN SATURATION: 99 % | DIASTOLIC BLOOD PRESSURE: 78 MMHG | HEART RATE: 59 BPM | BODY MASS INDEX: 36.98 KG/M2

## 2021-11-10 DIAGNOSIS — M70.62 TROCHANTERIC BURSITIS OF LEFT HIP: Primary | ICD-10-CM

## 2021-11-10 DIAGNOSIS — M16.12 PRIMARY OSTEOARTHRITIS OF LEFT HIP: ICD-10-CM

## 2021-11-10 PROCEDURE — 99214 OFFICE O/P EST MOD 30 MIN: CPT | Performed by: FAMILY MEDICINE

## 2021-11-10 PROCEDURE — 20610 DRAIN/INJ JOINT/BURSA W/O US: CPT | Performed by: FAMILY MEDICINE

## 2021-11-10 RX ORDER — TRIAMCINOLONE ACETONIDE 40 MG/ML
40 INJECTION, SUSPENSION INTRA-ARTICULAR; INTRAMUSCULAR ONCE
Status: COMPLETED | OUTPATIENT
Start: 2021-11-10 | End: 2021-11-10

## 2021-11-10 RX ADMIN — TRIAMCINOLONE ACETONIDE 40 MG: 40 INJECTION, SUSPENSION INTRA-ARTICULAR; INTRAMUSCULAR at 11:31

## 2021-11-10 NOTE — PROGRESS NOTES
"Dileep is a 76 y.o. year old male     Chief Complaint   Patient presents with   • Left Hip - Initial Evaluation       History of Present Illness  HPI   Left hip pain - has been present for years on and off, worse with walking or golf. Lateral, radiates down the lateral thigh. Not better with ice/heat. Recently better with oral steroid from Dr. Rubi but still present.     Recently went to immediate care and had x-rays noting right worse than left hip arthritis      Review of Systems    /78   Pulse 59   Temp 96.9 °F (36.1 °C)   Ht 182.9 cm (72\")   Wt 124 kg (273 lb)   SpO2 99%   BMI 37.03 kg/m²      Physical Exam    Vital signs reviewed.   General: No acute distress.      MSK Exam:  Ortho Exam  Left hip: Normal appearance.  There is tenderness to palpation on the greater trochanter extending laterally down the IT band.  There is normal range of motion but there is pain with resisted abduction, positive Desirae.  Negative impingement.  Equivocal Stinchfield.    Trochanteric Bursa Injection Procedure Note    Left trochanteric bursa/gluteal tendon insertion injection was discussed with the patient in detail, including indication, risks, benefits, and alternatives. Verbal consent was given for the procedure. Injection was performed by physician.  Injection site was identified by physical examination and cleaned with Betadine and alcohol swabs. Prior to needle insertion, ethyl chloride spray was used for surface anesthesia. Sterile technique was used.  A 25-gauge, 1.5\" needle was directed to the bursa space by direct approach. Injectate was passed without difficulty. The needle was removed and a simple bandage was applied. The procedure was tolerated well without difficulty.    Injection mixture:  1% lidocaine without epinephrine: 2 mL  40 mg/mL triamcinolone acetonide: 1 mL       Diagnoses and all orders for this visit:    Trochanteric bursitis of left hip  -     triamcinolone acetonide (KENALOG-40) injection 40 " mg  -     Ambulatory Referral to Physical Therapy Evaluate and treat    Primary osteoarthritis of left hip  -     Ambulatory Referral to Physical Therapy Evaluate and treat    His pain appears to be primarily from the greater trochanteric region today with bursitis/gluteal tendinitis.  Discussed options and agreed upon injection today which was tolerated well.  Also I gave him home exercises and planned for some physical therapy to encourage long-term optimization.  We discussed that his underlying hip arthritis is contributing to this to some degree.  If he does not have good pain relief we can consider ultrasound-guided intra-articular hip injection next.  We will follow-up in 4 weeks to check on his progress or sooner if necessary.      EMR Dragon/Transcription disclaimer:    Much of this encounter note is an electronic transcription/translation of spoken language to printed text.  The electronic translation of spoken language may permit erroneous, or at times, nonsensical words or phrases to be inadvertently transcribed.  Although I have reviewed the note for such errors some may still exist.

## 2021-11-15 ENCOUNTER — TREATMENT (OUTPATIENT)
Dept: PHYSICAL THERAPY | Facility: CLINIC | Age: 76
End: 2021-11-15

## 2021-11-15 DIAGNOSIS — M25.552 LEFT HIP PAIN: Primary | ICD-10-CM

## 2021-11-15 DIAGNOSIS — M16.12 PRIMARY OSTEOARTHRITIS OF LEFT HIP: ICD-10-CM

## 2021-11-15 DIAGNOSIS — M70.62 TROCHANTERIC BURSITIS OF LEFT HIP: ICD-10-CM

## 2021-11-15 PROCEDURE — 97110 THERAPEUTIC EXERCISES: CPT | Performed by: PHYSICAL THERAPIST

## 2021-11-15 PROCEDURE — 97530 THERAPEUTIC ACTIVITIES: CPT | Performed by: PHYSICAL THERAPIST

## 2021-11-15 PROCEDURE — 97161 PT EVAL LOW COMPLEX 20 MIN: CPT | Performed by: PHYSICAL THERAPIST

## 2021-11-15 NOTE — PROGRESS NOTES
Physical Therapy Initial Evaluation and Plan of Care    Patient: Dileep Hamilton   : 1945  Diagnosis/ICD-10 Code:  Left hip pain [M25.552]  Referring practitioner: Gee Powell MD    Subjective Evaluation    History of Present Illness  Onset date: acute on chronic exacerbation.  Mechanism of injury: Patient's (L) hip had been progressively worsening over the last several months. 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  Voltaren was not helpful and patient was prescribed a steroid dose pack which helped somewhat with the intensity of the pain.  He was referred to Dr. Powell who gave him an injection in his (L) trochanteric bursa which he reports has not helped so patient left a message this morning for Dr. Powell trying to get back in sooner for an appointment.  There was discussion of an ultrasound-guided injection of the (L) hip. He has been trying to do the exercises Dr. Powell gave him but is having trouble with the IT band stretch.    Patient is having trouble walking, golfing and bowling (the latter two which he has not been able to do for over a month).  Sitting on hard surfaces is painful as is forward bending and squatting. Unable to lay on his (L) side to sleep. (+) sleep interruption.      Patient Occupation: R[employer]   Precautions and Work Restrictions: golf, bowlingQuality of life: good    Pain  Current pain ratin  At best pain ratin  At worst pain ratin  Location: (L) lateral hip, lateral thigh  Quality: dull ache and sharp (starts out dull, progresses to sharper pain)  Alleviating factors: ice, heat not helpful.  Aggravating factors: ambulation, squatting, stairs and movement  Progression: worsening    Social Support  Lives in: one-story house    Hand dominance: left    Diagnostic Tests  X-ray: abnormal (OA (B) hips, (R) > (L))    Patient Goals  Patient goals for therapy: decreased pain  Patient goal: get rid of the pain and back to doing my  normal activities           Subjective Questionnaire: LEFS: 41/80    Objective          Tenderness     Left Hip   Tenderness in the greater trochanter.     Additional Tenderness Details  Mod (+) TTP of (L) lateral and posterior aspects of (L) greater trochanter  Mod (+) TTP along (L) ITB    Active Range of Motion   Left Hip   Flexion: WFL  External rotation (90/90): Left hip active external rotation 90/90: mild limitation and (L) trochanteric pain.   Internal rotation (90/90): Left hip active internal rotation 90/90: moderate limitation and (L) trochanteric pain.     Right Hip   Normal active range of motion    Strength/Myotome Testing     Left Knee   Flexion: 4+  Extension: 5    Right Knee   Flexion: 4+  Extension: 5    Additional Strength Details  (L) hip strength testing pain-limited    Tests     Left Hip   Positive GOMEZ, FADIR and piriformis.     Right Hip   Positive FADIR.   Negative GOMEZ and piriformis.     Left Hip Flexibility Comments:   Significant restriction of (L) hamstrings in 90/90    Right Hip Flexibility Comments:   Moderate restriction of (R) hamstrings in 90/90    Ambulation     Observational Gait   Gait: antalgic   Decreased walking speed, left stance time and right step length.     Additional Observational Gait Details  Patient ambulates with mild antalgia (L) LE          Assessment & Plan     Assessment  Impairments: abnormal gait, abnormal or restricted ROM, activity intolerance, impaired physical strength, lacks appropriate home exercise program and pain with function  Assessment details: Patient is a 76 y.o. male who reports an acute on chronic exacerbation of (L) lateral hip pain which has limited his tolerance to walking and squatting activities as well as interfered with his sleep.  He rates symptoms 4-7/10 and unfortunately they have only minimally improved with a steroid dose pack and an injection to the greater trochanteric bursa.  He demonstrates gait deficit, tenderness, decreased  (L) hip ROM, decreased LE strength, and positive special testing of the (L) hip.  His LEFS score is 41/80 indicating a significant perceived level of physical disability due to the (L) hip issue.  He may benefit from skilled PT services to address these deficits and assist patient in return to optimal level of physical function and independence for improved QOL.  Prognosis: good  Functional Limitations: sleeping, walking, uncomfortable because of pain, sitting and stooping  Goals  Plan Goals: STGs: to be met in 4 weeks  1. Patient will be independent with initial HEP  2. Patient will report improved (L) hip symptoms 2-5/10 for improved positional and mobility tolerance  3. Patient will report 25% improved tolerance to prolonged sitting and walking for improved function  4. Patient will exhibit min (+) TTP over 50% more localized area (L) lateral hip as evidence of resolving inflammation  5. Patient will report consistently sleeping without pain interruption for improved restorative healing    LTGs: to be met in 8 weeks  1. Patient will be independent with progressed HEP  2. Patient will report improved (L) hip symptoms 0-2/10 for improved positional tolerance and ability to ambulate moderate community distances on level and unlevel terrain  3. Patient will demonstrate painfree, functional AROM of (L) hip for improved joint mobility  4. Patient will have improved (L) hip strength all planes >/= 4+/5 for improved function  5. Patient will successfully resume golf and bowling activities symptom-free  6. Patient will have improved LEFS score >/= 50/80 for subjective evidence of functional improvement    Plan  Therapy options: will be seen for skilled physical therapy services  Planned modality interventions: cryotherapy and thermotherapy (hydrocollator packs)  Planned therapy interventions: abdominal trunk stabilization, ADL retraining, flexibility, functional ROM exercises, gait training, home exercise program, joint  mobilization, manual therapy, neuromuscular re-education, soft tissue mobilization, strengthening, stretching and therapeutic activities  Frequency: 2x week  Duration in weeks: 8  Treatment plan discussed with: patient        Manual Therapy:         mins  07152;  Therapeutic Exercise:    16     mins  94453;     Neuromuscular Nubia:        mins  12116;    Therapeutic Activity:     10     mins  49183;     Gait Training:           mins  80400;     Ultrasound:          mins  18557;    Electrical Stimulation:         mins  94690 ( );  Dry Needling          mins self-pay    Timed Treatment:   26   mins   Total Treatment:     42   mins    PT SIGNATURE: Shell Jean PT, DPT, OCS  [place] License #[id number]     DATE TREATMENT INITIATED: 11/15/2021    Medicare Initial Certification  Certification Period: 11/15/2021 thru 2/12/2022  I certify that the therapy services are furnished while this patient is under my care.  The services outlined above are required by this patient, and will be reviewed every 90 days.     PHYSICIAN: Gee Powell MD      DATE:     Please sign and return via fax to [phone]. Thank you, [facility].

## 2021-11-19 ENCOUNTER — TREATMENT (OUTPATIENT)
Dept: PHYSICAL THERAPY | Facility: CLINIC | Age: 76
End: 2021-11-19

## 2021-11-19 DIAGNOSIS — M70.62 TROCHANTERIC BURSITIS OF LEFT HIP: ICD-10-CM

## 2021-11-19 DIAGNOSIS — M25.552 LEFT HIP PAIN: Primary | ICD-10-CM

## 2021-11-19 PROCEDURE — 97110 THERAPEUTIC EXERCISES: CPT | Performed by: PHYSICAL THERAPIST

## 2021-11-19 NOTE — PROGRESS NOTES
Physical Therapy Daily Treatment Note      Patient: Dileep Hamilton   : 1945  Referring practitioner: Gee Powell MD  Date of Initial Visit: Type: THERAPY  Noted: 11/15/2021  Today's Date: 2021  Patient seen for 2 sessions         Visit Diagnoses:     ICD-10-CM ICD-9-CM   1. Left hip pain  M25.552 719.45   2. Trochanteric bursitis of left hip  M70.62 726.5         Subjective Evaluation    History of Present Illness    Subjective comment: My hip feels about the same so far.  I did get an appointment with Dr. Powell to have him do that ultrasound-guided injection. Pain  Current pain ratin  Location: (L) lateral hip and proximal lateral thigh           Objective   See Exercise, Manual, and Modality Logs for complete treatment.   *Initiated hip IR AROM and sidelying clamshell    Assessment & Plan     Assessment  Assessment details: Patient reports no discernible symptom improvement as yet, though it is early in treatment.  He verbalizes HEP compliance and demonstrates good exercise recall in clinic as evidence.  Discussed ways to incorporate exercises into various positions throughout his day to render them more functional.  He is able to progress basic (L) LE ROM and proximal strengthening activities with good form and minimal discomfort.          Progress per Plan of Care         Timed:  Manual Therapy:         mins  51484;  Therapeutic Exercise:    32     mins  32689;     Neuromuscular Nubia:        mins  19114;    Therapeutic Activity:          mins  40260;     Gait Training:           mins  84965;     Ultrasound:          mins  97561;    Untimed:  Electrical Stimulation:         mins  84275 ( );  Mechanical Traction:         mins  62819;   Dry Needling              ___  mins   50721    Timed Treatment:   32   mins   Total Treatment:     32   mins    Shell Jean PT, DPT, OCS  Physical Therapist  [place] License #[id number]

## 2021-11-22 ENCOUNTER — TREATMENT (OUTPATIENT)
Dept: PHYSICAL THERAPY | Facility: CLINIC | Age: 76
End: 2021-11-22

## 2021-11-22 DIAGNOSIS — M70.62 TROCHANTERIC BURSITIS OF LEFT HIP: ICD-10-CM

## 2021-11-22 DIAGNOSIS — M25.552 LEFT HIP PAIN: Primary | ICD-10-CM

## 2021-11-22 PROCEDURE — 97110 THERAPEUTIC EXERCISES: CPT | Performed by: PHYSICAL THERAPIST

## 2021-11-22 NOTE — PROGRESS NOTES
Physical Therapy Daily Progress Note      Visit # 3      Subjective Evaluation    History of Present Illness    Subjective comment: Pt reports that his hip feels pretty good when sitting, but the longer he stands or walk the worse the pain gets.Pain  Current pain ratin  At best pain ratin           Objective   See Exercise, Manual, and Modality Logs for complete treatment.   Added standing IT band stretch    Assessment/Plan  Pt tolerated treatment with no c/o increased pain in (L) hip.  Pt was able to progress reps on his strengthening exercises with no issues.  Added standing IT band stretch at wall.  Pt reported feeling a stretch from it.                 Manual Therapy:    0     mins  42906;  Therapeutic Exercise:    28     mins  87285;     Neuromuscular Nubia:    0    mins  52855;    Therapeutic Activity:     0     mins  90072;     Gait Trainin     mins  29345;     Ultrasound:     0     mins  13460;    Work Hardening           0      mins 32502  Iontophoresis               0   mins 48674  E-Stim                          _0_ mins 83799 ( )    Timed Treatment:   28   mins   Total Treatment:     28   mins    Cali Damon PTA  Physical Therapist Assistant

## 2021-11-23 ENCOUNTER — OFFICE VISIT (OUTPATIENT)
Dept: SPORTS MEDICINE | Facility: CLINIC | Age: 76
End: 2021-11-23

## 2021-11-23 VITALS
OXYGEN SATURATION: 98 % | HEART RATE: 84 BPM | TEMPERATURE: 98 F | RESPIRATION RATE: 18 BRPM | BODY MASS INDEX: 36.92 KG/M2 | SYSTOLIC BLOOD PRESSURE: 164 MMHG | WEIGHT: 272.6 LBS | HEIGHT: 72 IN | DIASTOLIC BLOOD PRESSURE: 80 MMHG

## 2021-11-23 DIAGNOSIS — M70.62 TROCHANTERIC BURSITIS OF LEFT HIP: Primary | ICD-10-CM

## 2021-11-23 PROCEDURE — 99214 OFFICE O/P EST MOD 30 MIN: CPT | Performed by: FAMILY MEDICINE

## 2021-11-23 PROCEDURE — 20611 DRAIN/INJ JOINT/BURSA W/US: CPT | Performed by: FAMILY MEDICINE

## 2021-11-23 RX ORDER — METHYLPREDNISOLONE ACETATE 80 MG/ML
80 INJECTION, SUSPENSION INTRA-ARTICULAR; INTRALESIONAL; INTRAMUSCULAR; SOFT TISSUE ONCE
Status: COMPLETED | OUTPATIENT
Start: 2021-11-23 | End: 2021-11-23

## 2021-11-23 RX ADMIN — METHYLPREDNISOLONE ACETATE 80 MG: 80 INJECTION, SUSPENSION INTRA-ARTICULAR; INTRALESIONAL; INTRAMUSCULAR; SOFT TISSUE at 15:08

## 2021-11-29 ENCOUNTER — TREATMENT (OUTPATIENT)
Dept: PHYSICAL THERAPY | Facility: CLINIC | Age: 76
End: 2021-11-29

## 2021-11-29 DIAGNOSIS — M70.62 TROCHANTERIC BURSITIS OF LEFT HIP: ICD-10-CM

## 2021-11-29 DIAGNOSIS — M25.552 LEFT HIP PAIN: Primary | ICD-10-CM

## 2021-11-29 PROCEDURE — 97110 THERAPEUTIC EXERCISES: CPT | Performed by: PHYSICAL THERAPIST

## 2021-11-29 PROCEDURE — 97112 NEUROMUSCULAR REEDUCATION: CPT | Performed by: PHYSICAL THERAPIST

## 2021-11-29 NOTE — PROGRESS NOTES
Physical Therapy Daily Treatment Note      Patient: Dileep Hamilton   : 1945  Referring practitioner: Gee Powell MD  Date of Initial Visit: Type: THERAPY  Noted: 11/15/2021  Today's Date: 2021  Patient seen for 4 sessions         Visit Diagnoses:     ICD-10-CM ICD-9-CM   1. Left hip pain  M25.552 719.45   2. Trochanteric bursitis of left hip  M70.62 726.5         Subjective Evaluation    History of Present Illness    Subjective comment: I had the ultrasound-guided injection in my hip last Tuesday.  He also used a different medicine this time with it. I can tell improvement already, about 50% improvement at least, and the pain has improved to just a dull ache if I'm up walking for a while.  I just have a little dull ache right now, just enough to know it's there. I go back to see Dr. Powell in about 4 weeks.Pain  Pain scale: 1-2/10.  Location: (L) lateral, posterolateral hip           Objective   See Exercise, Manual, and Modality Logs for complete treatment.   *Progressed hold time for hip abd isometrics  *Added 3 sec hold to clamshells  *Initiated SLR and hip abduction    Assessment & Plan     Assessment    Assessment details: Patient reports much improved (L) hip symptoms following ultrasound-guided injection last Tuesday.  He exhibits improved tolerance to exercise program and is able to progress current HEP as well as initiate open chain hip strengthening activities without symptom provocation. His gait pattern has improved as well to include reduced antalgia and increased (L) weightshift and stance time.           Progress strengthening /stabilization /functional activity         Timed:  Manual Therapy:         mins  84941;  Therapeutic Exercise:    27     mins  10424;     Neuromuscular Nubia:    11    mins  11011;    Therapeutic Activity:          mins  25562;     Gait Training:           mins  15854;     Ultrasound:          mins  87774;    Untimed:  Electrical Stimulation:         mins   07651 ( );  Mechanical Traction:         mins  97012;   Dry Needling              ___  mins   20561    Timed Treatment:   38   mins   Total Treatment:     38   mins    Shell Jean PT, DPT, OCS  Physical Therapist  [place] License #[id number]

## 2021-11-29 NOTE — PROGRESS NOTES
"Dileep is a 76 y.o. year old male     Chief Complaint   Patient presents with   • Left Hip - Pain, Follow-up       History of Present Illness  HPI   Follow-up left hip pain.  Unfortunately not progressing as well as hoped.  Reports minimal improvement from his injection but does feel like physical therapy has been somewhat helpful.      Review of Systems    /80 (BP Location: Left arm, Patient Position: Sitting, Cuff Size: Adult)   Pulse 84   Temp 98 °F (36.7 °C) (Temporal)   Resp 18   Ht 182.9 cm (72\")   Wt 124 kg (272 lb 9.6 oz)   SpO2 98%   BMI 36.97 kg/m²      Physical Exam    Vital signs reviewed.   General: No acute distress.      MSK Exam:  Ortho Exam  Left hip: Persistent tenderness on the greater trochanter extending superiorly to the abductor tendons.    Trochanteric Bursa Injection Procedure Note    Left trochanteric bursa/gluteal tendon insertion injection was discussed with the patient in detail, including indication, risks, benefits, and alternatives. Verbal consent was given for the procedure. Injection was performed by physician.  Injection site was identified by ultrasound examination and cleaned with Betadine and alcohol swabs. Ultrasound guidance was used for the procedure for confirmation of needle placement due to previous failed procedure.  Prior to needle insertion, ethyl chloride spray was used for surface anesthesia. Sterile technique was used.  A 22-gauge, 3.5\" needle was directed to the bursa space by direct approach. Injectate was passed without difficulty. The needle was removed and a simple bandage was applied. The procedure was tolerated well without difficulty.    Injection mixture:  2% lidocaine without epinephrine: 2 mL  80 mg/mL methylprednisolone: 1 mL     Diagnoses and all orders for this visit:    Trochanteric bursitis of left hip  -     methylPREDNISolone acetate (DEPO-medrol) injection 80 mg    This still appears very consistent with trochanteric bursitis/gluteal " tendinopathy.  We agreed to try a repeat injection under ultrasound guidance optimize localization, also try using Depo-Medrol instead of Kenalog.  Continue physical therapy.  We will follow-up in 4-6 weeks to check on his progress.      EMR Dragon/Transcription disclaimer:    Much of this encounter note is an electronic transcription/translation of spoken language to printed text.  The electronic translation of spoken language may permit erroneous, or at times, nonsensical words or phrases to be inadvertently transcribed.  Although I have reviewed the note for such errors some may still exist.

## 2021-12-03 ENCOUNTER — TREATMENT (OUTPATIENT)
Dept: PHYSICAL THERAPY | Facility: CLINIC | Age: 76
End: 2021-12-03

## 2021-12-03 DIAGNOSIS — M70.62 TROCHANTERIC BURSITIS OF LEFT HIP: ICD-10-CM

## 2021-12-03 DIAGNOSIS — M16.12 PRIMARY OSTEOARTHRITIS OF LEFT HIP: ICD-10-CM

## 2021-12-03 DIAGNOSIS — M25.552 LEFT HIP PAIN: Primary | ICD-10-CM

## 2021-12-03 PROCEDURE — 97110 THERAPEUTIC EXERCISES: CPT | Performed by: PHYSICAL THERAPIST

## 2021-12-03 NOTE — PROGRESS NOTES
"Physical Therapy Daily Progress Note      Visit # 5      Subjective Evaluation    History of Present Illness    Subjective comment: Pt report that his ((L) hip is \"buddy aggravated this morning\".Pain  Current pain rating: 3           Objective   See Exercise, Manual, and Modality Logs for complete treatment.   Added standing hip abd, ext and knee flexion    Assessment & Plan     Assessment    Assessment details: Pt completed treatment with no c/o pain in (L) hip.  He was able to progress reps on his prescribed exercises with no issues.  Pt tolerated introduction of standing LE exercise .  Continue to progress strengthening and stabilization as tolerated.                     Manual Therapy:    0     mins  31180;  Therapeutic Exercise:    33     mins  97530;     Neuromuscular Nubia:    0    mins  26360;    Therapeutic Activity:     0     mins  73907;     Gait Trainin     mins  00564;     Ultrasound:     0     mins  20124;    Work Hardening           0      mins 82484  Iontophoresis               0   mins 04689  E-Stim                          _0_ mins 58462 ( )    Timed Treatment:   33   mins   Total Treatment:     33   mins    Cali Damon PTA  Physical Therapist Assistant  "

## 2021-12-06 ENCOUNTER — TREATMENT (OUTPATIENT)
Dept: PHYSICAL THERAPY | Facility: CLINIC | Age: 76
End: 2021-12-06

## 2021-12-06 DIAGNOSIS — M25.552 LEFT HIP PAIN: Primary | ICD-10-CM

## 2021-12-06 DIAGNOSIS — M70.62 TROCHANTERIC BURSITIS OF LEFT HIP: ICD-10-CM

## 2021-12-06 DIAGNOSIS — M16.12 PRIMARY OSTEOARTHRITIS OF LEFT HIP: ICD-10-CM

## 2021-12-06 PROCEDURE — 97530 THERAPEUTIC ACTIVITIES: CPT | Performed by: PHYSICAL THERAPIST

## 2021-12-06 PROCEDURE — 97110 THERAPEUTIC EXERCISES: CPT | Performed by: PHYSICAL THERAPIST

## 2021-12-06 NOTE — PROGRESS NOTES
"   Physical Therapy Daily Treatment Note      Patient: Dileep Hamilton   : 1945  Referring practitioner: Gee Powell MD  Date of Initial Visit: Type: THERAPY  Noted: 11/15/2021  Today's Date: 2021  Patient seen for 6 sessions         Visit Diagnoses:     ICD-10-CM ICD-9-CM   1. Left hip pain  M25.552 719.45   2. Trochanteric bursitis of left hip  M70.62 726.5   3. Primary osteoarthritis of left hip  M16.12 715.15         Subjective Evaluation    History of Present Illness    Subjective comment: My hip has been feeling pretty good.  I'm still feeling about 50% better, maybe a little more.  Instead of having the sharp pain if I am up on it too long, it will just ache some.  The pain is nowhere near what it was before.         Objective          Ambulation     Observational Gait     Additional Observational Gait Details  Patient ambulates with minimal antalgia (L) LE including slightly decreased weight shift and stance time       See Exercise, Manual, and Modality Logs for complete treatment.       Assessment & Plan     Assessment    Assessment details: Patient verbalizes compliance with HEP without questions, and in fact has incorporated this as a regular part of his daily morning routine.  He demonstrates good form with all exercises and is educated in how to identify and implement appropriate exercise progression.  Educated patient in safe initiation and progression of a stationary cycling program.  He presents a total body stretching HEP \"for adults over age 60\" his wife has obtained and asks questions about the appropriateness of these exercises for himself and this is discussed in detail as well.  All questions are answered to patient satisfaction and at this time he appears ready and capable of D/C from skilled PT services to independence with progressed HEP and self-management.  He is agreeable to this.           Other - tentative discharge to independence with self management and progressed " HEP.         Timed:  Manual Therapy:         mins  03118;  Therapeutic Exercise:    41     mins  86206;     Neuromuscular Nubia:        mins  51808;    Therapeutic Activity:     12     mins  03561;     Gait Training:           mins  89297;     Ultrasound:          mins  32918;    Untimed:  Electrical Stimulation:         mins  24131 ( );  Mechanical Traction:         mins  09293;   Dry Needling              ___  mins   98536    Timed Treatment:   53   mins   Total Treatment:     53   mins    Shell Jean PT, AIMET, OCS  Physical Therapist  [place] License #[id number]

## 2021-12-27 ENCOUNTER — OFFICE VISIT (OUTPATIENT)
Dept: SPORTS MEDICINE | Facility: CLINIC | Age: 76
End: 2021-12-27

## 2021-12-27 VITALS
SYSTOLIC BLOOD PRESSURE: 150 MMHG | HEIGHT: 72 IN | OXYGEN SATURATION: 98 % | TEMPERATURE: 97.1 F | WEIGHT: 278 LBS | HEART RATE: 58 BPM | BODY MASS INDEX: 37.65 KG/M2 | DIASTOLIC BLOOD PRESSURE: 68 MMHG

## 2021-12-27 DIAGNOSIS — M16.12 PRIMARY OSTEOARTHRITIS OF LEFT HIP: ICD-10-CM

## 2021-12-27 DIAGNOSIS — M70.62 TROCHANTERIC BURSITIS OF LEFT HIP: Primary | ICD-10-CM

## 2021-12-27 PROCEDURE — 99213 OFFICE O/P EST LOW 20 MIN: CPT | Performed by: FAMILY MEDICINE

## 2021-12-27 NOTE — PROGRESS NOTES
"Dileep is a 76 y.o. year old male     Chief Complaint   Patient presents with   • Left Hip - Follow-up       History of Present Illness  HPI   F/u left hip. Improving more now; injection helped. If on his feet more than 15-20 minutes develops some aching pain but less severe. Pain remains lateral. Up to 45 minutes on stationary bike at home and feeling well with that.   Bowling with friends, having pain at end of 2nd round but less than before.       Review of Systems    /68 (BP Location: Left arm, Patient Position: Sitting)   Pulse 58   Temp 97.1 °F (36.2 °C) (Temporal)   Ht 182.9 cm (72.01\")   Wt 126 kg (278 lb)   SpO2 98%   BMI 37.70 kg/m²      Physical Exam    Vital signs reviewed.   General: No acute distress.    MSK Exam:  Left Hip Exam     Comments:  Normal appearance.   There is tenderness to palpation on the greater trochanter.   There is no anterior joint tenderness.   Range of motion is similar to previous exams in that it is stiff in flexion and internal rotation.   These primarily reproduce pain in the lateral aspect of the hip.   He has good strength with abduction and external rotation.            Diagnoses and all orders for this visit:    Trochanteric bursitis of left hip    Primary osteoarthritis of left hip    Assessment and plan   - Thankfully he is making good progress, at least 50% improved. His exam and description are most consistent with trochanteric pain, but he certainly has an arthritic component as well. He is comfortable to work on his therapy exercises for about another month. At that time, we will follow up and check on his progress. Depending on how he is doing, we may consider either repeating a trochanteric bursa injection or consider an ultrasound guided intra-articular hip injection as I think that would be helpful for us to clarify how much of his pain is coming from his arthritis component. He is certainly welcome to call me sooner if his pain is worsening or not " tolerable.      EMR Dragon/Transcription disclaimer:    Much of this encounter note is an electronic transcription/translation of spoken language to printed text.  The electronic translation of spoken language may permit erroneous, or at times, nonsensical words or phrases to be inadvertently transcribed.  Although I have reviewed the note for such errors some may still exist.      Transcribed from ambient dictation for Gee Powell MD by SAIRA ANGULO, RegSched Rep.  12/27/21   15:47 EST

## 2022-01-31 RX ORDER — LOSARTAN POTASSIUM 100 MG/1
TABLET ORAL
Qty: 90 TABLET | Refills: 0 | Status: SHIPPED | OUTPATIENT
Start: 2022-01-31 | End: 2022-04-26

## 2022-02-02 ENCOUNTER — OFFICE VISIT (OUTPATIENT)
Dept: SPORTS MEDICINE | Facility: CLINIC | Age: 77
End: 2022-02-02

## 2022-02-02 VITALS
HEART RATE: 56 BPM | SYSTOLIC BLOOD PRESSURE: 156 MMHG | HEIGHT: 72 IN | WEIGHT: 278 LBS | DIASTOLIC BLOOD PRESSURE: 80 MMHG | TEMPERATURE: 96.8 F | OXYGEN SATURATION: 100 % | BODY MASS INDEX: 37.65 KG/M2

## 2022-02-02 DIAGNOSIS — M70.62 TROCHANTERIC BURSITIS OF LEFT HIP: Primary | ICD-10-CM

## 2022-02-02 PROCEDURE — 99213 OFFICE O/P EST LOW 20 MIN: CPT | Performed by: FAMILY MEDICINE

## 2022-02-02 PROCEDURE — 20611 DRAIN/INJ JOINT/BURSA W/US: CPT | Performed by: FAMILY MEDICINE

## 2022-02-02 RX ORDER — METHYLPREDNISOLONE ACETATE 80 MG/ML
80 INJECTION, SUSPENSION INTRA-ARTICULAR; INTRALESIONAL; INTRAMUSCULAR; SOFT TISSUE ONCE
Status: COMPLETED | OUTPATIENT
Start: 2022-02-02 | End: 2022-02-02

## 2022-02-02 RX ADMIN — METHYLPREDNISOLONE ACETATE 80 MG: 80 INJECTION, SUSPENSION INTRA-ARTICULAR; INTRALESIONAL; INTRAMUSCULAR; SOFT TISSUE at 11:25

## 2022-02-02 NOTE — PROGRESS NOTES
"Dileep is a 76 y.o. year old male     Chief Complaint   Patient presents with   • Left Hip - Follow-up       History of Present Illness  HPI   F/u left hip. Feels almost back to original pain level. Worse with bowling, could not complete his game.       Review of Systems    /80   Pulse 56   Temp 96.8 °F (36 °C)   Ht 182.9 cm (72.01\")   Wt 126 kg (278 lb)   SpO2 100%   BMI 37.69 kg/m²      Physical Exam    Vital signs reviewed.   General: No acute distress.      MSK Exam:  Ortho Exam  Left hip: Normal appearance.  There is tenderness palpation on the greater trochanter again.  There is normal range of motion and strength with pain with resisted abduction.  There is no internal pain, no sciatic notch pain, no impingement signs.    Trochanteric Bursa Injection Procedure Note     Left trochanteric bursa/gluteal tendon insertion injection was discussed with the patient in detail, including indication, risks, benefits, and alternatives. Verbal consent was given for the procedure. Injection was performed by physician.  Injection site was identified by ultrasound examination and cleaned with Betadine and alcohol swabs. Ultrasound guidance was used for the procedure for confirmation of needle placement due to previous failed procedure.  Prior to needle insertion, ethyl chloride spray was used for surface anesthesia. Sterile technique was used.  A 22-gauge, 3.5\" needle was directed to the bursa space by direct approach. Injectate was passed without difficulty. The needle was removed and a simple bandage was applied. The procedure was tolerated well without difficulty.     Injection mixture:  2% lidocaine without epinephrine: 2 mL  80 mg/mL methylprednisolone: 1 mL       Diagnoses and all orders for this visit:    Trochanteric bursitis of left hip  -     methylPREDNISolone acetate (DEPO-medrol) injection 80 mg    Discussed options in detail.  I think it would be reasonable to try 1 more injection with Depo-Medrol " and continue physical therapy here.  If this has the same response we will consider further evaluation options.      EMR Dragon/Transcription disclaimer:    Much of this encounter note is an electronic transcription/translation of spoken language to printed text.  The electronic translation of spoken language may permit erroneous, or at times, nonsensical words or phrases to be inadvertently transcribed.  Although I have reviewed the note for such errors some may still exist.

## 2022-03-02 ENCOUNTER — OFFICE VISIT (OUTPATIENT)
Dept: SPORTS MEDICINE | Facility: CLINIC | Age: 77
End: 2022-03-02

## 2022-03-02 VITALS
WEIGHT: 278 LBS | DIASTOLIC BLOOD PRESSURE: 82 MMHG | SYSTOLIC BLOOD PRESSURE: 138 MMHG | OXYGEN SATURATION: 98 % | TEMPERATURE: 97.8 F | RESPIRATION RATE: 16 BRPM | BODY MASS INDEX: 37.65 KG/M2 | HEIGHT: 72 IN | HEART RATE: 67 BPM

## 2022-03-02 DIAGNOSIS — M70.62 TROCHANTERIC BURSITIS OF LEFT HIP: Primary | ICD-10-CM

## 2022-03-02 PROCEDURE — 99213 OFFICE O/P EST LOW 20 MIN: CPT | Performed by: FAMILY MEDICINE

## 2022-03-02 NOTE — PROGRESS NOTES
"EDMOND Falk is a 76 y.o. year old male here today for follow up of Trochanteric bursitis of left hip     Last visit date: 2/2/2022   Notes improvement with last injection. Has been cautious with activity to minimize stress. Dull aching now. Home PT exercises more tolerable and helpful now.       Review of Systems    /82 (BP Location: Left arm, Patient Position: Sitting, Cuff Size: Adult)   Pulse 67   Temp 97.8 °F (36.6 °C)   Resp 16   Ht 182.9 cm (72.01\")   Wt 126 kg (278 lb)   SpO2 98%   BMI 37.69 kg/m²    Pain Score    03/02/22 1031   PainSc:   2   PainLoc: Hip        Physical Exam    Vital signs reviewed.   General: No acute distress.      MSK Exam:  Ortho Exam        Diagnoses and all orders for this visit:    Trochanteric bursitis of left hip      He is making better progress today.  Continue progression of physical therapy to optimize long-term progress.    EMR Dragon/Transcription disclaimer:    Much of this encounter note is an electronic transcription/translation of spoken language to printed text.  The electronic translation of spoken language may permit erroneous, or at times, nonsensical words or phrases to be inadvertently transcribed.  Although I have reviewed the note for such errors some may still exist.     "

## 2022-04-05 ENCOUNTER — OFFICE VISIT (OUTPATIENT)
Dept: SPORTS MEDICINE | Facility: CLINIC | Age: 77
End: 2022-04-05

## 2022-04-05 VITALS
OXYGEN SATURATION: 98 % | WEIGHT: 278 LBS | SYSTOLIC BLOOD PRESSURE: 146 MMHG | HEIGHT: 72 IN | HEART RATE: 78 BPM | RESPIRATION RATE: 16 BRPM | TEMPERATURE: 98.2 F | BODY MASS INDEX: 37.65 KG/M2 | DIASTOLIC BLOOD PRESSURE: 82 MMHG

## 2022-04-05 DIAGNOSIS — M70.62 TROCHANTERIC BURSITIS OF LEFT HIP: Primary | ICD-10-CM

## 2022-04-05 PROCEDURE — 20610 DRAIN/INJ JOINT/BURSA W/O US: CPT | Performed by: FAMILY MEDICINE

## 2022-04-05 PROCEDURE — 99213 OFFICE O/P EST LOW 20 MIN: CPT | Performed by: FAMILY MEDICINE

## 2022-04-05 RX ORDER — METHYLPREDNISOLONE ACETATE 80 MG/ML
80 INJECTION, SUSPENSION INTRA-ARTICULAR; INTRALESIONAL; INTRAMUSCULAR; SOFT TISSUE ONCE
Status: COMPLETED | OUTPATIENT
Start: 2022-04-05 | End: 2022-04-05

## 2022-04-05 RX ADMIN — METHYLPREDNISOLONE ACETATE 80 MG: 80 INJECTION, SUSPENSION INTRA-ARTICULAR; INTRALESIONAL; INTRAMUSCULAR; SOFT TISSUE at 11:52

## 2022-04-05 NOTE — PROGRESS NOTES
"Dileep is a 76 y.o. year old male     Chief Complaint   Patient presents with   • Hip Pain     Would like to discuss left hip cortisone injection.        History of Present Illness  HPI   F/u left hip pain. Continues to improve gradually. Noted better tolerance of walking while in [place]. Did well driving also.       Review of Systems    /82 (BP Location: Left arm, Patient Position: Sitting, Cuff Size: Adult)   Pulse 78   Temp 98.2 °F (36.8 °C)   Resp 16   Ht 182.9 cm (72.01\")   Wt 126 kg (278 lb)   SpO2 98%   BMI 37.69 kg/m²      Physical Exam    Vital signs reviewed.   General: No acute distress.      MSK Exam:  Ortho Exam  Mild tenderness on the left greater trochanter.  Improved range of motion and decreased pain with resisted abduction and adduction stretch.    Trochanteric Bursa Injection Procedure Note     Left trochanteric bursa/gluteal tendon insertion injection was discussed with the patient in detail, including indication, risks, benefits, and alternatives. Verbal consent was given for the procedure. Injection was performed by physician.  Injection site was identified by ultrasound examination and cleaned with Betadine and alcohol swabs. Ultrasound guidance was used for the procedure for confirmation of needle placement due to previous failed procedure.  Prior to needle insertion, ethyl chloride spray was used for surface anesthesia. Sterile technique was used.  A 22-gauge, 3.5\" needle was directed to the bursa space by direct approach. Injectate was passed without difficulty. The needle was removed and a simple bandage was applied. The procedure was tolerated well without difficulty.     Injection mixture:  2% lidocaine without epinephrine: 2 mL  80 mg/mL methylprednisolone: 1 mL     Diagnoses and all orders for this visit:    Trochanteric bursitis of left hip  -     methylPREDNISolone acetate (DEPO-medrol) injection 80 mg      Add early repeat injection for upcoming [place] to [place] in 2 " weeks to ensure pain control.  Following this I hope to be able to avoid injections for several months at least.  Continue home exercises.  Recheck in about 6 weeks.

## 2022-04-26 RX ORDER — LOSARTAN POTASSIUM 100 MG/1
TABLET ORAL
Qty: 90 TABLET | Refills: 0 | Status: SHIPPED | OUTPATIENT
Start: 2022-04-26 | End: 2022-07-20

## 2022-05-05 ENCOUNTER — DOCUMENTATION (OUTPATIENT)
Dept: PHYSICAL THERAPY | Facility: CLINIC | Age: 77
End: 2022-05-05

## 2022-05-05 DIAGNOSIS — M70.62 TROCHANTERIC BURSITIS OF LEFT HIP: ICD-10-CM

## 2022-05-05 DIAGNOSIS — M25.552 LEFT HIP PAIN: Primary | ICD-10-CM

## 2022-05-05 DIAGNOSIS — M16.12 PRIMARY OSTEOARTHRITIS OF LEFT HIP: ICD-10-CM

## 2022-05-05 NOTE — PROGRESS NOTES
Discharge Summary  Discharge Summary from Physical Therapy Report    Patient Information  Dileep Hamilton  1945    Dates  PT visit: 11/15/2022 - 12/06/2022  Number of Visits: 6     Discharge Status of Patient: See final note dated 12/06/2022    Goals: Partially Met    Visit Diagnoses:    ICD-10-CM ICD-9-CM   1. Left hip pain  M25.552 719.45   2. Trochanteric bursitis of left hip  M70.62 726.5   3. Primary osteoarthritis of left hip  M16.12 715.15       Discharge Plan: Continue with current home exercise program as instructed    Date of Discharge 05/05/2022        Shell Jean, PT, DPT, OCS  Physical Therapist

## 2022-05-18 ENCOUNTER — OFFICE VISIT (OUTPATIENT)
Dept: SPORTS MEDICINE | Facility: CLINIC | Age: 77
End: 2022-05-18

## 2022-05-18 VITALS
BODY MASS INDEX: 37.65 KG/M2 | RESPIRATION RATE: 16 BRPM | DIASTOLIC BLOOD PRESSURE: 84 MMHG | OXYGEN SATURATION: 97 % | HEART RATE: 78 BPM | TEMPERATURE: 97.8 F | HEIGHT: 72 IN | WEIGHT: 278 LBS | SYSTOLIC BLOOD PRESSURE: 132 MMHG

## 2022-05-18 DIAGNOSIS — M25.552 LEFT HIP PAIN: Primary | ICD-10-CM

## 2022-05-18 PROCEDURE — 99213 OFFICE O/P EST LOW 20 MIN: CPT | Performed by: FAMILY MEDICINE

## 2022-06-04 ENCOUNTER — HOSPITAL ENCOUNTER (OUTPATIENT)
Dept: MRI IMAGING | Facility: HOSPITAL | Age: 77
Discharge: HOME | End: 2022-06-04
Admitting: FAMILY MEDICINE

## 2022-06-04 DIAGNOSIS — M25.552 LEFT HIP PAIN: ICD-10-CM

## 2022-06-04 PROCEDURE — 73721 MRI JNT OF LWR EXTRE W/O DYE: CPT

## 2022-06-06 ENCOUNTER — TELEPHONE (OUTPATIENT)
Dept: SPORTS MEDICINE | Facility: CLINIC | Age: 77
End: 2022-06-06

## 2022-06-06 NOTE — TELEPHONE ENCOUNTER
Patient called in wanting to let you know he had his MRI done on Saturday of his hip.     Thanks  Desi

## 2022-06-08 ENCOUNTER — TELEPHONE (OUTPATIENT)
Dept: SPORTS MEDICINE | Facility: CLINIC | Age: 77
End: 2022-06-08

## 2022-06-09 PROBLEM — N40.0 BENIGN PROSTATIC HYPERPLASIA WITHOUT LOWER URINARY TRACT SYMPTOMS: Status: ACTIVE | Noted: 2022-06-09

## 2022-06-10 ENCOUNTER — OFFICE VISIT (OUTPATIENT)
Dept: SPORTS MEDICINE | Facility: CLINIC | Age: 77
End: 2022-06-10

## 2022-06-10 DIAGNOSIS — M25.552 LEFT HIP PAIN: Primary | ICD-10-CM

## 2022-06-10 NOTE — PROGRESS NOTES
Discussed MRI results in detail with patient.  Explained findings as discussed in my result note.  My clinical suspicion is that he has gluteal tendinopathy not evidence on his MRI.  We will try an intra-articular injection first to rule out radiating arthritic pain, if that does not make a substantial difference then I think we need to start thinking about regenerative approach to greater trochanteric pain syndrome.

## 2022-06-17 ENCOUNTER — PROCEDURE VISIT (OUTPATIENT)
Dept: SPORTS MEDICINE | Facility: CLINIC | Age: 77
End: 2022-06-17

## 2022-06-17 VITALS
DIASTOLIC BLOOD PRESSURE: 70 MMHG | WEIGHT: 274 LBS | HEIGHT: 72 IN | TEMPERATURE: 97.8 F | OXYGEN SATURATION: 96 % | RESPIRATION RATE: 16 BRPM | SYSTOLIC BLOOD PRESSURE: 140 MMHG | BODY MASS INDEX: 37.11 KG/M2 | HEART RATE: 54 BPM

## 2022-06-17 DIAGNOSIS — M16.12 PRIMARY OSTEOARTHRITIS OF LEFT HIP: Primary | ICD-10-CM

## 2022-06-17 PROCEDURE — 20611 DRAIN/INJ JOINT/BURSA W/US: CPT | Performed by: FAMILY MEDICINE

## 2022-06-17 RX ORDER — GLUCOSAM/CHON-MSM1/C/MANG/BOSW 750-644 MG
TABLET ORAL
COMMUNITY
Start: 2021-06-01

## 2022-06-17 RX ORDER — TRIAMCINOLONE ACETONIDE 40 MG/ML
40 INJECTION, SUSPENSION INTRA-ARTICULAR; INTRAMUSCULAR ONCE
Status: COMPLETED | OUTPATIENT
Start: 2022-06-17 | End: 2022-06-17

## 2022-06-17 RX ADMIN — TRIAMCINOLONE ACETONIDE 40 MG: 40 INJECTION, SUSPENSION INTRA-ARTICULAR; INTRAMUSCULAR at 09:43

## 2022-06-17 NOTE — PROGRESS NOTES
"Here for left hip injection as previously discussed for diagnostic/therapeutic benefit    Ultrasound-Guided Hip Injection Procedure Note    Left hip injection was discussed with the patient in detail, including indication, risks, benefits, and alternatives. Verbal consent was given for the procedure. Injection was performed by physician.  Injection site was identified by ultrasound examination, then cleaned with Betadine and alcohol swabs. Prior to needle insertion, ethyl chloride spray was used for surface anesthesia. Sterile technique was used. Ultrasound guidance was indicated due to proximity of neurovascular structures and injection accuracy.  A 22-gauge, 3.5\" spinal needle was guided to the anterior joint capsule under continuous direct ultrasound visualization. Injectate was seen filing the capsule and passed without difficulty. The needle was removed and a simple bandage was applied. The procedure was tolerated well without difficulty.    Injection mixture:  1% lidocaine without epinephrine: 2 mL  40 mg/mL triamcinolone acetonide: 1 mL     Diagnoses and all orders for this visit:    Primary osteoarthritis of left hip  -     triamcinolone acetonide (KENALOG-40) injection 40 mg    Other orders  -     Misc Natural Products (Osteo Bi-Flex Adv Triple St) tablet    I asked him to call me next week and update me on his response  "

## 2022-06-22 ENCOUNTER — TELEPHONE (OUTPATIENT)
Dept: SPORTS MEDICINE | Facility: CLINIC | Age: 77
End: 2022-06-22

## 2022-06-22 NOTE — TELEPHONE ENCOUNTER
Patient LVM stating that the injection that he got Friday did not help and is experiencing pain and would like to know what the next steps are. Please advise, thank you!

## 2022-06-28 DIAGNOSIS — E55.9 VITAMIN D DEFICIENCY: ICD-10-CM

## 2022-06-28 DIAGNOSIS — I10 ESSENTIAL HYPERTENSION: Primary | ICD-10-CM

## 2022-06-28 DIAGNOSIS — E66.01 MORBID OBESITY DUE TO EXCESS CALORIES: ICD-10-CM

## 2022-06-28 DIAGNOSIS — E78.2 MIXED HYPERLIPIDEMIA: ICD-10-CM

## 2022-06-28 DIAGNOSIS — E11.9 CONTROLLED TYPE 2 DIABETES MELLITUS WITHOUT COMPLICATION, WITHOUT LONG-TERM CURRENT USE OF INSULIN: ICD-10-CM

## 2022-06-28 DIAGNOSIS — N40.0 BENIGN PROSTATIC HYPERPLASIA WITHOUT LOWER URINARY TRACT SYMPTOMS: ICD-10-CM

## 2022-06-28 DIAGNOSIS — Z12.5 PROSTATE CANCER SCREENING: ICD-10-CM

## 2022-06-28 NOTE — TELEPHONE ENCOUNTER
Patient called back again and LVM stating that he has not heard back from our office in regards to the injection. He states that he would like to know what to do in regards to the pain because the injection has not helped at all. Please advise, thank you!

## 2022-06-29 NOTE — TELEPHONE ENCOUNTER
Based on the fact that his other injection did not help I think we should consider PRP to the greater trochanter next to treat chronic tendon dysfunction there.

## 2022-06-30 ENCOUNTER — TELEPHONE (OUTPATIENT)
Dept: SPORTS MEDICINE | Facility: CLINIC | Age: 77
End: 2022-06-30

## 2022-06-30 NOTE — TELEPHONE ENCOUNTER
Pt called wanting to get scheduled for a PRP injection but didn't know which injection he needs.     Please advise.

## 2022-07-15 ENCOUNTER — TELEPHONE (OUTPATIENT)
Dept: SPORTS MEDICINE | Facility: CLINIC | Age: 77
End: 2022-07-15

## 2022-07-15 NOTE — TELEPHONE ENCOUNTER
Provider: LYNDA TSANG    Caller: GORDON JOLLY  Relationship to Patient: SELF    Pharmacy: [facility]: [address] [facility] CLAUDIA TAYLOR: [phone]    Phone Number: [phone]    Reason for Call: REQUESTING A SOONER APPT THAN 07/28/22 OR IF SOMETHING AVAIL TO OFFER PAIN RELIEF UNTIL APPT     When was the patient last seen: 06/17/2022

## 2022-07-20 ENCOUNTER — TELEMEDICINE (OUTPATIENT)
Dept: INTERNAL MEDICINE | Facility: CLINIC | Age: 77
End: 2022-07-20

## 2022-07-20 ENCOUNTER — TELEPHONE (OUTPATIENT)
Dept: INTERNAL MEDICINE | Facility: CLINIC | Age: 77
End: 2022-07-20

## 2022-07-20 DIAGNOSIS — U07.1 COVID-19: Primary | ICD-10-CM

## 2022-07-20 DIAGNOSIS — R05.9 COUGH: ICD-10-CM

## 2022-07-20 PROCEDURE — 99213 OFFICE O/P EST LOW 20 MIN: CPT | Performed by: NURSE PRACTITIONER

## 2022-07-20 RX ORDER — LOSARTAN POTASSIUM 100 MG/1
TABLET ORAL
Qty: 90 TABLET | Refills: 0 | Status: SHIPPED | OUTPATIENT
Start: 2022-07-20 | End: 2022-10-29

## 2022-07-20 RX ORDER — GUAIFENESIN AND CODEINE PHOSPHATE 100; 10 MG/5ML; MG/5ML
5 SOLUTION ORAL 3 TIMES DAILY PRN
Qty: 180 ML | Refills: 1 | Status: SHIPPED | OUTPATIENT
Start: 2022-07-20 | End: 2022-12-22

## 2022-07-20 NOTE — TELEPHONE ENCOUNTER
Called Josie back and spoke with her.  Notified her of diagnosis and ICD 10 code, how long patient had the cough, and failed meds.

## 2022-07-20 NOTE — TELEPHONE ENCOUNTER
Caller: Dileep Hamilton    Relationship: Self    Best call back number: [phone]    What medication are you requesting: COUGH MEDICATION    What are your current symptoms: COUGH, COVID POSITIVE    How long have you been experiencing symptoms: 1 WEEK    Have you had these symptoms before:    [] Yes  [x] No    Have you been treated for these symptoms before:   [] Yes  [x] No    If a prescription is needed, what is your preferred pharmacy and phone number:  [address] ([facility] [place] - [address] [facility] [phone] [facility] [phone]   [phone]    Additional notes: PATIENT STATES HE HAS COVID AND HAS TRIED ALL OVER THE COUNTER MEDICATIONS FOR COUGH AND NOTHING IS HELPING, HE IS HAVING TROUBLE SLEEPING AND STATES HE IS ONLY GETTING 1 HOUR OF SLEEP A NIGHT

## 2022-07-20 NOTE — PROGRESS NOTES
Subjective   Dileep Hamilton is a 77 y.o. male.   Chief Complaint   Patient presents with   • Cough     There were no vitals filed for this visit.  No LMP for male patient.    Dileep is a 77 year old male patient of Dr Rubi who is being seen via telemedicine for an acute visit. Both the patient and the provider are located in [place]. He c/o cough for the last week. He tested positive for covid 19 yesterday.     Cough  This is a new problem. The current episode started in the past 7 days. The problem has been unchanged. The problem occurs every few minutes. The cough is non-productive. Pertinent negatives include no headaches, hemoptysis, shortness of breath or wheezing. He has tried rest and OTC cough suppressant (robitussin, otc cough medication ) for the symptoms. The treatment provided no relief.        The following portions of the patient's history were reviewed and updated as appropriate: allergies, current medications, past family history, past medical history, past social history, past surgical history and problem list.    Review of Systems   Respiratory: Positive for cough. Negative for hemoptysis, shortness of breath and wheezing.    Neurological: Negative for headaches.       Objective   Physical Exam  Constitutional:       General: He is not in acute distress.  Pulmonary:      Effort: Pulmonary effort is normal.   Neurological:      Mental Status: He is alert.         Assessment & Plan   Diagnoses and all orders for this visit:    1. COVID-19 (Primary)  -     guaiFENesin-codeine (GUAIFENESIN AC) 100-10 MG/5ML liquid; Take 5 mL by mouth 3 (Three) Times a Day As Needed for Cough or Congestion.  Dispense: 180 mL; Refill: 1    2. Cough  -     guaiFENesin-codeine (GUAIFENESIN AC) 100-10 MG/5ML liquid; Take 5 mL by mouth 3 (Three) Times a Day As Needed for Cough or Congestion.  Dispense: 180 mL; Refill: 1      pts symptoms started a week ago so he does not qualify for infusion or antiviral tx   rx for guaifenesin  ac given to patient to take as needed, advised not to take it and drive as it can cause drowsiness  I recommend attention to rest and fluids. I recommend as needed tylenol for fevers and aches. I recommend adding OTC vitamin D, C and zinc. You should quarantine for ten days.  It can be five days if you are feeling better and fever free but you should continue to wear a mask for a total of ten days around others.  Reasons to go to the ER include severe trouble breathing, chest pain, confusion, inability to wake or stay awake, and bluish lips or face.  Continue supportive measures and let me know if there is any change in symptoms.

## 2022-07-20 NOTE — TELEPHONE ENCOUNTER
Pharmacy Name: [facility] [address] [place] [address] ([facility]), [place] - [address] [facility] [phone] [facility] [phone]      Pharmacy representative name: JUAQUIN    Pharmacy representative phone number: [phone]*    What medication are you calling in regards to: guaiFENesin-codeine (GUAIFENESIN AC) 100-10 MG/5ML liquid    What question does the pharmacy have: NEED TO VERIFY DIAGNOSIS. IF AN ACUTE DIAGNOSIS, JUAQUIN STATES THAT SHE CAN ONLY DISPENSE A 7-DAY SUPPLY AND WILL HAVE TO VOID OUT THE ORIGINAL PRESCRIPTION.    Who is the provider that prescribed the medication: JAK RM NP    Additional notes: PHARMACY REQUEST CALL BACK TO ADVISE.

## 2022-08-11 ENCOUNTER — PROCEDURE VISIT (OUTPATIENT)
Dept: SPORTS MEDICINE | Facility: CLINIC | Age: 77
End: 2022-08-11

## 2022-08-11 VITALS
WEIGHT: 277 LBS | HEART RATE: 65 BPM | BODY MASS INDEX: 37.52 KG/M2 | OXYGEN SATURATION: 98 % | HEIGHT: 72 IN | DIASTOLIC BLOOD PRESSURE: 72 MMHG | TEMPERATURE: 98 F | SYSTOLIC BLOOD PRESSURE: 182 MMHG

## 2022-08-11 DIAGNOSIS — M25.552 GREATER TROCHANTERIC PAIN SYNDROME OF LEFT LOWER EXTREMITY: Primary | ICD-10-CM

## 2022-08-11 PROCEDURE — PRPACP: Performed by: FAMILY MEDICINE

## 2022-08-11 RX ORDER — ACETAMINOPHEN AND CODEINE PHOSPHATE 300; 30 MG/1; MG/1
1-2 TABLET ORAL EVERY 6 HOURS PRN
Qty: 30 TABLET | Refills: 0 | Status: SHIPPED | OUTPATIENT
Start: 2022-08-11 | End: 2022-12-22

## 2022-08-11 NOTE — PROGRESS NOTES
Here today for PRP injection for left hip greater trochanteric pain syndrome.  Rationale reviewed.  Consent verified.    PRP collected and  using the Arthrex ACP protocol.  Yield approximately 6 mL.    Under sterile technique and continuous ultrasound visualization the left greater trochanter was identified.  The site was initially anesthetized with a total of 4 mL 0.5% plain lidocaine under continuous visualization down to the tissue layer surrounding the tendon insertions but not into the tendon itself.  Subsequently the syringe was changed and a light fenestration was performed with intermittent ministration of PRP gently into the distal gluteus medius/minimus and bathing the surface of the trochanter itself.  Tolerated well with minimal discomfort.  Simple bandage applied.    Post procedure instructions reviewed.  Expect increased pain for the first several days.  Do not expect to see benefits until 1 month post procedure.  Resume physical therapy exercises about 1 week after procedure.    Diagnoses and all orders for this visit:    Greater trochanteric pain syndrome of left lower extremity  -     acetaminophen-codeine (TYLENOL #3) 300-30 MG per tablet; Take 1-2 tablets by mouth Every 6 (Six) Hours As Needed (pain).

## 2022-09-07 ENCOUNTER — OFFICE VISIT (OUTPATIENT)
Dept: SPORTS MEDICINE | Facility: CLINIC | Age: 77
End: 2022-09-07

## 2022-09-07 VITALS
HEART RATE: 68 BPM | TEMPERATURE: 98.3 F | DIASTOLIC BLOOD PRESSURE: 72 MMHG | BODY MASS INDEX: 37.52 KG/M2 | SYSTOLIC BLOOD PRESSURE: 150 MMHG | OXYGEN SATURATION: 98 % | WEIGHT: 277 LBS | HEIGHT: 72 IN

## 2022-09-07 DIAGNOSIS — M21.70 LEG LENGTH INEQUALITY: ICD-10-CM

## 2022-09-07 DIAGNOSIS — M41.26 OTHER IDIOPATHIC SCOLIOSIS, LUMBAR REGION: Primary | ICD-10-CM

## 2022-09-07 DIAGNOSIS — M25.552 GREATER TROCHANTERIC PAIN SYNDROME OF LEFT LOWER EXTREMITY: ICD-10-CM

## 2022-09-07 DIAGNOSIS — M47.816 LUMBAR SPONDYLOSIS: ICD-10-CM

## 2022-09-07 PROCEDURE — 99213 OFFICE O/P EST LOW 20 MIN: CPT | Performed by: FAMILY MEDICINE

## 2022-09-07 NOTE — PROGRESS NOTES
"Dileep is a 77 y.o. year old male     Chief Complaint   Patient presents with   • Hip Pain     LEFT SIDE- 1 month follow up from PRP injection         History of Present Illness  HPI   Here to follow-up on chronic left lateral hip pain.  Unfortunately unchanged since last visit a month ago when we performed a PRP injection.      Review of Systems    /72 (BP Location: Right arm, Patient Position: Sitting, Cuff Size: Adult)   Pulse 68   Temp 98.3 °F (36.8 °C) (Temporal)   Ht 182.9 cm (72.01\")   Wt 126 kg (277 lb)   SpO2 98%   BMI 37.56 kg/m²      Physical Exam    Vital signs reviewed.   General: No acute distress.      MSK Exam:  Ortho Exam  Persistent tenderness left lateral hip.  Notably in the lumbar spine he does have some scoliotic curve and I measure leg length inequality with the left side two centimeters shorter than the right from the ASIS to the medial malleolus    Lumbar Spine X-Ray  Indication: Pain  Views: AP and Lateral    Findings:  Notable scoliosis, concave right with associated reactive osteophytes and spondylosis    No prior studies were available for comparison.     Diagnoses and all orders for this visit:    Other idiopathic scoliosis, lumbar region  -     XR Spine Lumbar 2 or 3 View    Lumbar spondylosis  -     XR Spine Lumbar 2 or 3 View    Greater trochanteric pain syndrome of left lower extremity    Leg length inequality      Upon further discussion of the scoliosis he states that he was given a shoe lift many years ago but was not transferable and he did not continue it.  Recommend that we start by adding an over-the-counter heel lift at 1/4\" up to 3/8\" to work on correcting that leg length inequality and see what we can accomplish with that.  If that does seem to be helpful we can arrange formal orthotics with a larger lift if necessary.  We discussed also consideration of pursuing possible lumbar sources of his pain although I do think this is still stemming directly from the hip " itself.  If this continues we may need to consider surgical consultation for bursectomy.  I asked him to follow-up with me by phone or [place] [employer] message in about 2 weeks.

## 2022-09-19 DIAGNOSIS — M25.552 GREATER TROCHANTERIC PAIN SYNDROME OF LEFT LOWER EXTREMITY: Primary | ICD-10-CM

## 2022-10-07 ENCOUNTER — TELEPHONE (OUTPATIENT)
Dept: ORTHOPEDIC SURGERY | Facility: CLINIC | Age: 77
End: 2022-10-07

## 2022-10-07 ENCOUNTER — OFFICE VISIT (OUTPATIENT)
Dept: ORTHOPEDIC SURGERY | Facility: CLINIC | Age: 77
End: 2022-10-07

## 2022-10-07 VITALS — TEMPERATURE: 97.1 F | HEIGHT: 72 IN | RESPIRATION RATE: 14 BRPM | BODY MASS INDEX: 37.8 KG/M2 | WEIGHT: 279.1 LBS

## 2022-10-07 DIAGNOSIS — M54.16 LUMBAR RADICULOPATHY: Primary | ICD-10-CM

## 2022-10-07 DIAGNOSIS — R52 PAIN: ICD-10-CM

## 2022-10-07 DIAGNOSIS — M25.552 LEFT HIP PAIN: ICD-10-CM

## 2022-10-07 PROCEDURE — 99204 OFFICE O/P NEW MOD 45 MIN: CPT | Performed by: ORTHOPAEDIC SURGERY

## 2022-10-07 PROCEDURE — 73502 X-RAY EXAM HIP UNI 2-3 VIEWS: CPT | Performed by: ORTHOPAEDIC SURGERY

## 2022-10-07 NOTE — TELEPHONE ENCOUNTER
THIS IS AN IHC FROM [facility] FOR LOWER BACK PAIN, CAN THIS BE SCHEDULED WITH [facility] OR DOES IT NEED REFERRED OUT?

## 2022-10-07 NOTE — PROGRESS NOTES
General Exam    Patient: Dileep Hamilton    YOB: 1945    Medical Record Number: [id number]    Chief Complaints: Left hip pain    History of Present Illness:     77 y.o. male patient who presents for evaluation treatment left hip pain.  Patient has been having symptoms for just over a year or so.  Pain is laterally based and posterior along the left side of the hip and buttock area.  He seen sports medicine had tried multiple conservative treatment efforts including anti-inflammatories, formal physical therapy, multiple injections of steroid and PRP.  States that the injections did not really help that much.  He did have a oral steroid Dosepak which seemed to be the most helpful for his symptoms through all the therapies tried.  Denies any radiation of his symptoms no groin pain.  Pain has just been mainly constant and worsening with increased activity.  Is gotten worse over the past year.  Patient has also tried intra-articular hip injection did not alleviate symptoms.    Denies any numbness or tingling.  Denies any fevers, cough or shortness of breath.    Allergies:   Allergies   Allergen Reactions   • Statins        Home Medications:      Current Outpatient Medications:   •  Cholecalciferol (VITAMIN D3) 20 MCG (800 UNIT) tablet, Take 800 Int'l Units by mouth Daily., Disp: 75 tablet, Rfl:   •  Coenzyme Q10 (COQ-10) 100 MG capsule, Take  by mouth., Disp: , Rfl:   •  losartan (COZAAR) 100 MG tablet, Take 1 tablet by mouth once daily, Disp: 90 tablet, Rfl: 0  •  Misc Natural Products (Osteo Bi-Flex Adv Triple St) tablet, , Disp: , Rfl:   •  Multiple Vitamin (MULTI VITAMIN DAILY PO), Take  by mouth., Disp: , Rfl:   •  Omega-3 Fatty Acids (FISH OIL) 1000 MG capsule capsule, Take  by mouth., Disp: , Rfl:   •  acetaminophen-codeine (TYLENOL #3) 300-30 MG per tablet, Take 1-2 tablets by mouth Every 6 (Six) Hours As Needed (pain)., Disp: 30 tablet, Rfl: 0  •  guaiFENesin-codeine (GUAIFENESIN AC) 100-10 MG/5ML  liquid, Take 5 mL by mouth 3 (Three) Times a Day As Needed for Cough or Congestion., Disp: 180 mL, Rfl: 1    Past Medical History:   Diagnosis Date   • Arthritis Hips   • Benign prostatic hyperplasia 2   • Bursitis of hip 09/2002   • Cataract Right eye 2020   • Colon cancer (HCC)     T1N0 stage I, s/p partial R colon resection 2014   • Heart murmur 2016   • Hernia Abdomen   • Hip arthrosis 09/2002   • History of cataract     B early 2014   • History of shingles     2013   • Hyperlipidemia    • Hypertension     dx'd 8/2014   • IFG (impaired fasting glucose)    • Leukocytosis    • Lung mass 11/12/2015    chronic per pt, distant imaging    • Obesity    • Personal history of scoliosis     mild   • Proteinuria    • Scoliosis Lower back   • Shingles outbreak 10/15/2017   • Squamous cell carcinoma     skin   • Stasis dermatitis    • Type 2 diabetes mellitus (HCC)     new dx 10/2015   • Visual impairment Cataracts   • Vitamin D deficiency        Past Surgical History:   Procedure Laterality Date   • CATARACT EXTRACTION Right 05/2020   • COLON SURGERY  12/05/2013   • SUBTOTAL COLECTOMY  12/05/2014    colon ca 2014; R side of colon only   • VASECTOMY      1978       Social History     Occupational History   • Occupation: Retired respiratory therapist   • Occupation: [employer]   Tobacco Use   • Smoking status: Never Smoker   • Smokeless tobacco: Never Used   Vaping Use   • Vaping Use: Never used   Substance and Sexual Activity   • Alcohol use: No   • Drug use: No   • Sexual activity: Yes     Partners: Female     Birth control/protection: Surgical, None     Comment: wife only; no hx STD's      Social History     Social History Narrative    Diet - variable; improved in 2018 with fruits and vegetables, cooks at home    Exercise - gym 3x/ week in past; 2018 added golf 2x/ week, walking dog    Caffeine - 2-3 cups coffee            Family History   Problem Relation Age of Onset   • Heart disease Mother    • Diabetes  "Mother    • Hypertension Mother    • Lung cancer Mother    • COPD Mother         `   • Asthma Mother    • Stroke Father    • Diabetes Father    • Heart disease Father    • Diabetes Maternal Grandmother    • Diabetes type II Sister    • Diabetes type II Brother    • No Known Problems Son        Review of Systems:      Constitutional: Denies fever, shaking or chills         All other pertinent positives and negatives as noted above in HPI.    Physical Exam: 77 y.o. male    Vitals:    10/07/22 0909   Resp: 14   Temp: 97.1 °F (36.2 °C)   Weight: 127 kg (279 lb 1.6 oz)   Height: 182.9 cm (72.01\")       General:  Patient is awake and alert.  Appears in no acute distress or discomfort.      Musculoskeletal/Extremities:    Left lower extremity examined patient does have some discomfort along the lateral aspect of the hip and posterior lateral aspect of the the buttock area on the left.  Negative straight leg raise and Stinchfield.  Motor and sensory intact distally.  2+ pedal pulses.         Radiology:     AP and lateral views of the left hip taken and reviewed to evaluate the patient's complaint/s.    Imaging shows some mild degenerative changes early bone sclerosis no acute fractures noted.  Does appear to be acquired scoliosis involving the lumbar spine with degenerative disc disease.    Lumbar spine films reviewed:  XR SPINE LUMBAR 2 OR 3 VW-     INDICATIONS: Pain     TECHNIQUE: 3 views of the lumbar spine     COMPARISON: None available     FINDINGS:     Thoracolumbar levoscoliosis is partly included. Loss of height the right  aspect of L3 is noted, probably chronic in nature, but could be result  of age-indeterminate compression fracture. The vertebral body heights  otherwise appear preserved. No other evidence of fracture is identified.  Multilevel endplate spurring, disc space narrowing, facet arthropathy  are present. Arterial calcification is noted. If further imaging  evaluation is indicated, MRI could be " considered.     IMPRESSION:     As described.     This report was finalized on 9/8/2022 1:19 PM by Dr. Fabricio Morrison M.D.         Left hip MRI reviewed:  MRI LEFT HIP WITHOUT CONTRAST     HISTORY: Chronic lateral left hip pain. Increasing symptoms. History of  colon cancer.     TECHNIQUE: MRI left hip includes axial T1, T2 fat-sat as well as coronal  T1, STIR sequences through both hips and a sagittal PD weighted sequence  of the left hip.     COMPARISON: None.     FINDINGS: Hip joint fluid is symmetric and is within normal limits.  There are mild arthritic changes of both hips with mild marginal spur  formation. There is no hip fracture or osteonecrosis. There is no  paralabral cyst formation. No trochanteric bursal collection is evident.  Mild chronic hamstring origin tendinopathy. The sacroiliac joints and  the pubic symphyseal joint appear within normal limits.     There is a levoscoliotic curvature of the lumbar spine associated with  multilevel degenerative disc disease and facet arthritis. Transitional  lumbosacral anatomy is present. There is prostate gland enlargement with  median lobe hypertrophy. There are small left posterior urinary bladder  diverticula.     IMPRESSION:  1. Mild arthritic changes of both hips without hip fracture or  osteonecrosis.  2. Chronic bilateral hamstring origin tendinopathy.  3. Transitional lumbosacral anatomy. Levoscoliotic curvature of the  lumbar spine with multilevel degenerative disc disease and facet  arthritis in the lumbar spine.  4. Prostate gland enlargement with median lobe hypertrophy.  5. Small left posterior urinary bladder diverticuli are present.     This report was finalized on 6/6/2022 11:03 AM by Dr. Ayden Gonzalez M.D.      Assessment: Left hip pain due to possible lumbar radiculopathy versus bursitis      Plan:      Discussed the findings with the patient and his wife.  His symptoms are suggestive of possible bursitis about the hip.  He did have a  negative intra-articular hip injection as well as multiple other injections which did not seem to help much.  Given his history,  he said the most relief he got was from some oral steroids as well as the  findings on the MRI that does not really show any bursitis or swelling of that area however there are  advanced degenerative changes involving his lumbar spine this may be more of a lumbar radiculopathy.  I am not 100% convinced that it is just bursitis and a bursectomy would alleviate his symptoms.  I would like to more information with a MRI of the lumbar spine and referral to my spine partner for further evaluation.  If spine work-up is negative may consider possible surgical discussion for bursectomy however we will wait and see once spine evaluates.     Patient will call me after evaluated by spine      We will plan for follow up as needed.    All questions were answered.  Patient understands and agrees with the plan.    Major Aviles MD    10/07/2022    CC to Juan A Rubi MD

## 2022-10-29 RX ORDER — LOSARTAN POTASSIUM 100 MG/1
TABLET ORAL
Qty: 90 TABLET | Refills: 0 | Status: SHIPPED | OUTPATIENT
Start: 2022-10-29 | End: 2023-01-06

## 2022-10-31 ENCOUNTER — HOSPITAL ENCOUNTER (OUTPATIENT)
Dept: MRI IMAGING | Facility: HOSPITAL | Age: 77
Discharge: HOME | End: 2022-10-31
Admitting: ORTHOPAEDIC SURGERY

## 2022-10-31 DIAGNOSIS — M54.16 LUMBAR RADICULOPATHY: ICD-10-CM

## 2022-10-31 PROCEDURE — 72148 MRI LUMBAR SPINE W/O DYE: CPT

## 2022-11-04 ENCOUNTER — OFFICE VISIT (OUTPATIENT)
Dept: ORTHOPEDIC SURGERY | Facility: CLINIC | Age: 77
End: 2022-11-04

## 2022-11-04 VITALS — WEIGHT: 282.8 LBS | HEIGHT: 73 IN | BODY MASS INDEX: 37.48 KG/M2 | TEMPERATURE: 96.8 F

## 2022-11-04 DIAGNOSIS — M25.552 LEFT HIP PAIN: ICD-10-CM

## 2022-11-04 DIAGNOSIS — M54.16 LUMBAR RADICULOPATHY: Primary | ICD-10-CM

## 2022-11-04 PROCEDURE — 99213 OFFICE O/P EST LOW 20 MIN: CPT | Performed by: NURSE PRACTITIONER

## 2022-11-04 RX ORDER — OFLOXACIN 3 MG/ML
SOLUTION/ DROPS OPHTHALMIC
COMMUNITY
Start: 2022-10-21 | End: 2022-12-22

## 2022-11-04 NOTE — PROGRESS NOTES
Patient Name: Dileep Hamilton   YOB: 1945  Referring Primary Care Physician: Juan A Rubi MD      Chief Complaint:    Chief Complaint   Patient presents with   • Left Hip - Initial Evaluation        HPI:  Dileep Hamilton is a 77 y.o. male who presents to [facility] for evaluation of left hip pain. This is an established patient of practice, new to me.  He reports about a year history of left lateral hip pain.  He denies injury.  Upon symptom onset he was utilizing Aleve which helped short-term.  He then tried Voltaren cream which did not help.  He saw Dr. Powell and had left greater trochanteric injections which helped temporarily.  He also had an intra-articular hip injection which helped temporarily.  He also had PRP injection to the left hip.  He did have a Medrol Dosepak early on which helped temporarily.  He also tried physical therapy which was not very beneficial.  He denies pain getting in and out of vehicles but does have some increased pain with taking stairs leading with the left leg.  He even tried a left heel lift as he does have leg length discrepancy diagnosed as a child, but this tended to aggravate the hip pain.  He also states he was diagnosed with scoliosis as a child.  He generally likes to golf and bowl, however due to the hip symptoms he has not been able to do so.  With failure of conservative treatment focused on the left hip, he was sent for lumbar MRI and here to discuss treatment options.  Prior pertinent records were reviewed.    PFSH:  See attached    ROS: As per HPI, otherwise negative    Objective:    Vitals:    11/04/22 1255   Temp: 96.8 °F (36 °C)     Body mass index is 37.31 kg/m².      Physical Exam  Constitutional:       Appearance: He is well-developed and well-nourished.   Eyes:      General: No scleral icterus.  Skin:     General: Skin is intact.   Neurological:      Mental Status: He is alert.   Psychiatric:         Mood  and Affect: Mood and affect normal.       Spine Musculoskeletal Exam    Gait    Antalgic: left    Short leg: left    Palpation    Thoracolumbar    Tenderness: present      Greater trochanter: left    Strength    Thoracolumbar    Thoracolumbar motor exam is normal.     Sensory    Thoracolumbar    Thoracolumbar sensation is normal.    Reflexes    Right      Quadriceps: 1/4      Achilles: 0/4     Left      Quadriceps: 1/4      Achilles: 0/4    Special Tests    Thoracolumbar      Right      SLR: no back or leg pain      Left      SLR: pain radiates to left leg      SLR pain at: 90 degrees    General      Constitutional: well-developed and well-nourished    Scleral icterus: no    Labored breathing: no    Psychiatric: normal mood and affect and no acute distress    Neurological: alert and oriented x3    Skin: intact        IMAGING:     No imaging in office today.  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  Agree with report although I do not have expertise in the aforementioned congenital anomalies.    Assessment:           Diagnoses and all orders for this visit:    1. Lumbar radiculopathy (Primary)  -     Ambulatory Referral to Pain Management    2. Left hip pain  -     Ambulatory Referral to Pain Management             Plan:  The patient is fairly convinced this is a primary hip issue and states Dr. Powell felt the same way.  There is certainly overlap and since the hip MRI was not overly concerning and he does have significant pathology in the lumbar spine, we will refer him for epidural injections to act  as a test and treatment.  He could trial transforaminal versus translaminar injections based on pain management specialist preference with close attention to the transitional anatomy.  He has facet arthropathy but really no back pain to speak of to justify medial branch blocks or ablation.  Depending on results from epidural injections, he will call and if symptoms are still life limiting, we will either refer back to Dr Aviles to further discuss the bursectomy versus referral to Dr. Collins to discuss lumbar decompression and possible multilevel fusion which patient is hopeful to avoid long-term.      Addendum: Spoke with patient on 1/10/2023.  He reports he had a diagnostic lumbar epidural with lidocaine only with Dr. Solitario and gained no relief.  He reports Dr. Solitario is fairly convinced this is not radicular in nature and more related to his hip.  He certainly has overlap of symptoms.  We will refer him to Dr. Collins per his request, but in the meantime he will also follow-up with Dr Aviles to discuss possibility of bursectomy which has been discussed in the past.    Return if symptoms worsen or fail to improve.

## 2022-12-21 NOTE — PROGRESS NOTES
The patient has a pain history of the following:  Lumbar radiculopathy  Left hip pain    Previous interventions that the patient has received include:   Left greater trochanteric bursa injection-4/5/22, 2/3/22, 11/28/21, 11/10/21 Dr. Powell temporary relief  Left intra-articular hip injection 6/17/22 -temporary relief  PRP injection in the left hip 8/11/22    Pain medications include:  None     Previously: Tylenol 3 - no improvement, Aleve, Voltaren gel, Medrol Dosepak (temporary relief), Advil     Other conservative modalities which the patient reports using include:  Physical Therapy: yes  Chiropractor: no  Massage Therapy: yes  TENS: yes  Neck or back surgery: no  Past pain management: no  Shoe lift- no improvement   Heat  Ice  Gel seat pad helps some    Past Significant Surgical History:  None     HPI:       CHIEF COMPLAINT: Back Pain    Dileep Hamilton is a 77 y.o. male referred here by UMBERTO Milian. Dileep Hamilton presents to the office for evaluation and treatment of Back Pain      History of Present Illness  Onset:  Over a year ago   Inciting Event:  Nothing in particular  Location:  In the left hip   Pain: Pain described as dull, ache and stabbing. Located in the left lateral hip and does not radiate.  Severity:  Pain rated as a 7 /10.  Apportions pain as 0%  back pain and 100% extremity pain.  Symptoms have been constant.  Exacerbation:  Walking or standing.   Alleviation:  Sitting helps some.  Associated Symptoms:   He denies any new onset of bowel or bladder weakness, significant leg weakness or saddle anesthesia. Ambulates: Without assistive device   Limitations: This pain limits the patient from standing and walking at times.   Goals: Functional goals include ability to do the above.     Aleve and advil used to help ~1 year ago, but that's stopped helping.     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         PEG Assessment   What number best describes your pain on average in the past week?6  What number best describes how, during the past week, pain has interfered with your enjoyment of life?7  What number best describes how, during the past week, pain has interfered with your general activity?  7        Current Outpatient Medications:   •  Cholecalciferol (VITAMIN D3) 20 MCG (800 UNIT) tablet, Take 800 Int'l Units by mouth Daily., Disp: 75 tablet, Rfl:   •  Coenzyme Q10 (COQ-10) 100 MG capsule, Take  by mouth., Disp: , Rfl:   •  losartan (COZAAR) 100 MG tablet, Take 1 tablet by mouth once daily, Disp: 90 tablet, Rfl: 0  •  Misc Natural Products (Osteo Bi-Flex Adv Triple St) tablet, , Disp: , Rfl:   •  Multiple Vitamin (MULTI VITAMIN DAILY PO), Take  by mouth., Disp: , Rfl:   •  Omega-3 Fatty Acids (FISH OIL) 1000 MG capsule capsule, Take  by mouth., Disp: , Rfl:     The following portions of the patient's history were reviewed and updated as appropriate: allergies, current medications, past family history, past medical history, past social history, past surgical history and problem list.      REVIEW OF PERTINENT MEDICAL DATA    10/31/22 MRI OF THE LUMBAR SPINE WITHOUT CONTRAST     CLINICAL HISTORY: Lumbar radiculopathy.     TECHNIQUE: MRI of the lumbar spine was obtained with sagittal T1,  proton-density, and T2-weighted images. Additionally, there are axial T1  and T2-weighted images through the lumbar spine.     FINDINGS:     There is transitional anatomy at the level of the lumbosacral junction.  The transitional vertebral body has been enumerated as a relatively  sacralized L5 segment for the purposes of this report. 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 Please take careful note of this enumeration  system at the time of any potential intervention.     As a result of the aforementioned fused hemivertebra, there is moderate  levoconvex  scoliotic curvature of the lumbar spine with its apex  centered at L2.     The conus medullaris terminates at the level of the mid body of L1 and  has normal signal intensity. The visualized distal thoracic spinal cord  is unremarkable.     At T12-L1, there is loss of disc space height and a disc bulge which  mildly narrows the right neural foramen. There is no significant degree  of left foraminal narrowing or canal stenosis.     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 As such, the L1-2 disc space will not be discussed. At the L2-3  level, there is severe central canal stenosis secondary to bulging disc  material, ligamentum flavum thickening, and facet hypertrophy in  addition to a congenitally narrow spinal canal. There is severe right  foraminal stenosis seen at L2-3 secondary to bulging disc material and  facet hypertrophic change.     At L3-4, there is a disc bulge which in conjunction with facet  hypertrophic change and a congenitally narrow spinal canal results in a  moderate degree of central canal stenosis. There is mild right foraminal  narrowing secondary to disc bulging and facet hypertrophy.     At L4-5, there is moderate to severe right and mild-to-moderate left  facet hypertrophic change. There is moderate right and severe left  foraminal stenosis secondary to disc bulging, loss of foraminal height,  and facet hypertrophic change.     At L5-S1, there is no significant canal or foraminal stenosis.     IMPRESSION:     There is transitional anatomy at the lumbosacral junction and the  transitional vertebral body has been enumerated as a relatively  sacralized L5 for the purposes of this report. Additionally, there is a  hemivertebra which has been labeled as the left aspect of the L2  vertebra which is fused with L1. As such, the L1-2 disc space is not  discussed. Please take careful note of this enumeration system at the  time of any potential intervention.  Also, I am happy to discuss this  enumeration system in a direct conversation if any further clarification  is needed.     As a result of the aforementioned fused hemivertebra, there is moderate  levoconvex scoliotic curvature of the lumbar spine with its apex  centered at L2.     The spinal canal is relatively narrow on a congenital basis and the  combination of facet hypertrophic change and disc bulging in addition to  the congenitally narrow spinal canal results in moderate canal stenosis  at the L3-4 level and severe canal stenosis at the L2-3 level.     There is severe right L2-3 and severe left L4-5 foraminal narrowing  secondary to loss of foraminal height, bulging disc material, and facet  hypertrophic change. Moderate right L4-5 foraminal narrowing is also  seen secondary to similar features.     This report was finalized on 11/2/2022 9:12 AM by Dr. Luis Felipe Okeefe M.D.     9/7/220XR SPINE LUMBAR 2 OR 3 VW-     INDICATIONS: Pain     TECHNIQUE: 3 views of the lumbar spine     COMPARISON: None available     FINDINGS:     Thoracolumbar levoscoliosis is partly included. Loss of height the right  aspect of L3 is noted, probably chronic in nature, but could be result  of age-indeterminate compression fracture. The vertebral body heights  otherwise appear preserved. No other evidence of fracture is identified.  Multilevel endplate spurring, disc space narrowing, facet arthropathy  are present. Arterial calcification is noted. If further imaging  evaluation is indicated, MRI could be considered.     IMPRESSION:     As described.     This report was finalized on 9/8/2022 1:19 PM by Dr. Fabricio Morrison M.D.      6/4/22 MRI LEFT HIP WITHOUT CONTRAST     HISTORY: Chronic lateral left hip pain. Increasing symptoms. History of  colon cancer.     TECHNIQUE: MRI left hip includes axial T1, T2 fat-sat as well as coronal  T1, STIR sequences through both hips and a sagittal PD weighted sequence  of the left hip.      COMPARISON: None.     FINDINGS: Hip joint fluid is symmetric and is within normal limits.  There are mild arthritic changes of both hips with mild marginal spur  formation. There is no hip fracture or osteonecrosis. There is no  paralabral cyst formation. No trochanteric bursal collection is evident.  Mild chronic hamstring origin tendinopathy. The sacroiliac joints and  the pubic symphyseal joint appear within normal limits.     There is a levoscoliotic curvature of the lumbar spine associated with  multilevel degenerative disc disease and facet arthritis. Transitional  lumbosacral anatomy is present. There is prostate gland enlargement with  median lobe hypertrophy. There are small left posterior urinary bladder  diverticula.     IMPRESSION:  1. Mild arthritic changes of both hips without hip fracture or  osteonecrosis.  2. Chronic bilateral hamstring origin tendinopathy.  3. Transitional lumbosacral anatomy. Levoscoliotic curvature of the  lumbar spine with multilevel degenerative disc disease and facet  arthritis in the lumbar spine.  4. Prostate gland enlargement with median lobe hypertrophy.  5. Small left posterior urinary bladder diverticuli are present.     This report was finalized on 6/6/2022 11:03 AM by Dr. Ayden Gonzalez M.D.    8/30/21 Creatinine 1.13    8/19/2020 platelets 296 (10*3)    Review of Systems   Constitutional: Positive for activity change (decreased). Negative for chills, fatigue and fever.   HENT: Negative for congestion.    Respiratory: Negative for chest tightness and shortness of breath.    Cardiovascular: Negative for chest pain.   Gastrointestinal: Negative for abdominal pain, constipation and diarrhea.   Genitourinary: Negative for difficulty urinating and dysuria.   Musculoskeletal: Positive for back pain.   Neurological: Negative for dizziness, weakness, light-headedness, numbness and headaches.   Psychiatric/Behavioral: Positive for sleep disturbance. Negative for agitation.  "The patient is not nervous/anxious.      I have reviewed and confirmed the accuracy of the ROS as documented by the MA/LPN/RN Tori Solitario MD      Vitals:    12/22/22 1034   Pulse: 72   Temp: 98.6 °F (37 °C)   SpO2: 100%   Weight: 130 kg (287 lb)   Height: 185.4 cm (73\")   PainSc:   7   PainLoc: Hip         Objective   Physical Exam  Vitals reviewed.   Constitutional:       General: He is not in acute distress.  Pulmonary:      Effort: Pulmonary effort is normal. No respiratory distress.   Musculoskeletal:      Comments: Ambulation: Without assistive device   Lumbar Exam:  Appearance: Scoliotic curve not appreciated and scarring absent  Palpated over lumbosacral paravertebral regions and transverse processes with negative tenderness appreciated, Bilateral.   Sacroiliac joints are not tender, Bilateral.  Trochanteric bursa are exquisitely tender, Left.  Straight leg raise is positive radiculopathy, Left.  Facet loading is negative for pain, Left.  Paraspinal/adjacent lumbar musculature are not tender to palpation, Left.   Skin:     General: Skin is warm and dry.   Neurological:      Mental Status: He is alert.   Psychiatric:         Mood and Affect: Mood normal.         Thought Content: Thought content normal.         Assessment & Plan   Diagnoses and all orders for this visit:    1. Left hip pain (Primary)    2. Trochanteric bursitis of left hip    3. Lumbar foraminal stenosis        - Pertinent labs reviewed.   - Pertinent imaging reviewed.   - Discussed left L4-5 Transforaminal epidural steroid injection.  Risks discussed including but not limited to bleeding, bruising, infection, damage to surrounding structures, headache, and rare things such as being paralyzed, seizure, stroke, heart attack and death.  The risk of steroid medications include but are not limited to immunosuppression, which can increase the risk of serafin an infectious disease as well as decrease the immune response to a vaccine.    - He " would like to consider his options and call us back if he wants to move forward with the epidural injection.  I also provided the option of performing a diagnostic only injection (without steroid) to help determine if this pain could be related to his foraminal stenosis.    - Dileep Hamilton reports a pain score of 7.  Given his pain assessment as noted, treatment options were discussed and the following options were decided upon as a follow-up plan to address the patient's pain: continuation of current treatment plan for pain.    --- Follow-up PRN         While examining this patient, I wore protective equipment including a mask and gloves.  I washed my hands before and after this patient encounter.  The patient wore a mask throughout the visit as well.     Tori Solitario MD  Pain Management    EMR Dragon/Transcription disclaimer:   Much of this encounter note is an electronic transcription/translation of spoken language to printed text. The electronic translation of spoken language may permit erroneous, or at times, nonsensical words or phrases to be inadvertently transcribed; Although I have reviewed the note for such errors, some may still exist.

## 2022-12-21 NOTE — H&P (VIEW-ONLY)
The patient has a pain history of the following:  Lumbar radiculopathy  Left hip pain    Previous interventions that the patient has received include:   Left greater trochanteric bursa injection-4/5/22, 2/3/22, 11/28/21, 11/10/21 Dr. Powell temporary relief  Left intra-articular hip injection 6/17/22 -temporary relief  PRP injection in the left hip 8/11/22    Pain medications include:  None     Previously: Tylenol 3 - no improvement, Aleve, Voltaren gel, Medrol Dosepak (temporary relief), Advil     Other conservative modalities which the patient reports using include:  Physical Therapy: yes  Chiropractor: no  Massage Therapy: yes  TENS: yes  Neck or back surgery: no  Past pain management: no  Shoe lift- no improvement   Heat  Ice  Gel seat pad helps some    Past Significant Surgical History:  None     HPI:       CHIEF COMPLAINT: Back Pain    Dileep Hamilton is a 77 y.o. male referred here by UMBERTO Milian. Dileep Hamilton presents to the office for evaluation and treatment of Back Pain      History of Present Illness  Onset:  Over a year ago   Inciting Event:  Nothing in particular  Location:  In the left hip   Pain: Pain described as dull, ache and stabbing. Located in the left lateral hip and does not radiate.  Severity:  Pain rated as a 7 /10.  Apportions pain as 0%  back pain and 100% extremity pain.  Symptoms have been constant.  Exacerbation:  Walking or standing.   Alleviation:  Sitting helps some.  Associated Symptoms:   He denies any new onset of bowel or bladder weakness, significant leg weakness or saddle anesthesia. Ambulates: Without assistive device   Limitations: This pain limits the patient from standing and walking at times.   Goals: Functional goals include ability to do the above.     Aleve and advil used to help ~1 year ago, but that's stopped helping.     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         PEG Assessment   What number best describes your pain on average in the past week?6  What number best describes how, during the past week, pain has interfered with your enjoyment of life?7  What number best describes how, during the past week, pain has interfered with your general activity?  7        Current Outpatient Medications:   •  Cholecalciferol (VITAMIN D3) 20 MCG (800 UNIT) tablet, Take 800 Int'l Units by mouth Daily., Disp: 75 tablet, Rfl:   •  Coenzyme Q10 (COQ-10) 100 MG capsule, Take  by mouth., Disp: , Rfl:   •  losartan (COZAAR) 100 MG tablet, Take 1 tablet by mouth once daily, Disp: 90 tablet, Rfl: 0  •  Misc Natural Products (Osteo Bi-Flex Adv Triple St) tablet, , Disp: , Rfl:   •  Multiple Vitamin (MULTI VITAMIN DAILY PO), Take  by mouth., Disp: , Rfl:   •  Omega-3 Fatty Acids (FISH OIL) 1000 MG capsule capsule, Take  by mouth., Disp: , Rfl:     The following portions of the patient's history were reviewed and updated as appropriate: allergies, current medications, past family history, past medical history, past social history, past surgical history and problem list.      REVIEW OF PERTINENT MEDICAL DATA    10/31/22 MRI OF THE LUMBAR SPINE WITHOUT CONTRAST     CLINICAL HISTORY: Lumbar radiculopathy.     TECHNIQUE: MRI of the lumbar spine was obtained with sagittal T1,  proton-density, and T2-weighted images. Additionally, there are axial T1  and T2-weighted images through the lumbar spine.     FINDINGS:     There is transitional anatomy at the level of the lumbosacral junction.  The transitional vertebral body has been enumerated as a relatively  sacralized L5 segment for the purposes of this report. 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 Please take careful note of this enumeration  system at the time of any potential intervention.     As a result of the aforementioned fused hemivertebra, there is moderate  levoconvex  scoliotic curvature of the lumbar spine with its apex  centered at L2.     The conus medullaris terminates at the level of the mid body of L1 and  has normal signal intensity. The visualized distal thoracic spinal cord  is unremarkable.     At T12-L1, there is loss of disc space height and a disc bulge which  mildly narrows the right neural foramen. There is no significant degree  of left foraminal narrowing or canal stenosis.     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 As such, the L1-2 disc space will not be discussed. At the L2-3  level, there is severe central canal stenosis secondary to bulging disc  material, ligamentum flavum thickening, and facet hypertrophy in  addition to a congenitally narrow spinal canal. There is severe right  foraminal stenosis seen at L2-3 secondary to bulging disc material and  facet hypertrophic change.     At L3-4, there is a disc bulge which in conjunction with facet  hypertrophic change and a congenitally narrow spinal canal results in a  moderate degree of central canal stenosis. There is mild right foraminal  narrowing secondary to disc bulging and facet hypertrophy.     At L4-5, there is moderate to severe right and mild-to-moderate left  facet hypertrophic change. There is moderate right and severe left  foraminal stenosis secondary to disc bulging, loss of foraminal height,  and facet hypertrophic change.     At L5-S1, there is no significant canal or foraminal stenosis.     IMPRESSION:     There is transitional anatomy at the lumbosacral junction and the  transitional vertebral body has been enumerated as a relatively  sacralized L5 for the purposes of this report. Additionally, there is a  hemivertebra which has been labeled as the left aspect of the L2  vertebra which is fused with L1. As such, the L1-2 disc space is not  discussed. Please take careful note of this enumeration system at the  time of any potential intervention.  Also, I am happy to discuss this  enumeration system in a direct conversation if any further clarification  is needed.     As a result of the aforementioned fused hemivertebra, there is moderate  levoconvex scoliotic curvature of the lumbar spine with its apex  centered at L2.     The spinal canal is relatively narrow on a congenital basis and the  combination of facet hypertrophic change and disc bulging in addition to  the congenitally narrow spinal canal results in moderate canal stenosis  at the L3-4 level and severe canal stenosis at the L2-3 level.     There is severe right L2-3 and severe left L4-5 foraminal narrowing  secondary to loss of foraminal height, bulging disc material, and facet  hypertrophic change. Moderate right L4-5 foraminal narrowing is also  seen secondary to similar features.     This report was finalized on 11/2/2022 9:12 AM by Dr. Luis Felipe Okeefe M.D.     9/7/220XR SPINE LUMBAR 2 OR 3 VW-     INDICATIONS: Pain     TECHNIQUE: 3 views of the lumbar spine     COMPARISON: None available     FINDINGS:     Thoracolumbar levoscoliosis is partly included. Loss of height the right  aspect of L3 is noted, probably chronic in nature, but could be result  of age-indeterminate compression fracture. The vertebral body heights  otherwise appear preserved. No other evidence of fracture is identified.  Multilevel endplate spurring, disc space narrowing, facet arthropathy  are present. Arterial calcification is noted. If further imaging  evaluation is indicated, MRI could be considered.     IMPRESSION:     As described.     This report was finalized on 9/8/2022 1:19 PM by Dr. Fabricio Morrison M.D.      6/4/22 MRI LEFT HIP WITHOUT CONTRAST     HISTORY: Chronic lateral left hip pain. Increasing symptoms. History of  colon cancer.     TECHNIQUE: MRI left hip includes axial T1, T2 fat-sat as well as coronal  T1, STIR sequences through both hips and a sagittal PD weighted sequence  of the left hip.      COMPARISON: None.     FINDINGS: Hip joint fluid is symmetric and is within normal limits.  There are mild arthritic changes of both hips with mild marginal spur  formation. There is no hip fracture or osteonecrosis. There is no  paralabral cyst formation. No trochanteric bursal collection is evident.  Mild chronic hamstring origin tendinopathy. The sacroiliac joints and  the pubic symphyseal joint appear within normal limits.     There is a levoscoliotic curvature of the lumbar spine associated with  multilevel degenerative disc disease and facet arthritis. Transitional  lumbosacral anatomy is present. There is prostate gland enlargement with  median lobe hypertrophy. There are small left posterior urinary bladder  diverticula.     IMPRESSION:  1. Mild arthritic changes of both hips without hip fracture or  osteonecrosis.  2. Chronic bilateral hamstring origin tendinopathy.  3. Transitional lumbosacral anatomy. Levoscoliotic curvature of the  lumbar spine with multilevel degenerative disc disease and facet  arthritis in the lumbar spine.  4. Prostate gland enlargement with median lobe hypertrophy.  5. Small left posterior urinary bladder diverticuli are present.     This report was finalized on 6/6/2022 11:03 AM by Dr. Ayden Gonzalez M.D.    8/30/21 Creatinine 1.13    8/19/2020 platelets 296 (10*3)    Review of Systems   Constitutional: Positive for activity change (decreased). Negative for chills, fatigue and fever.   HENT: Negative for congestion.    Respiratory: Negative for chest tightness and shortness of breath.    Cardiovascular: Negative for chest pain.   Gastrointestinal: Negative for abdominal pain, constipation and diarrhea.   Genitourinary: Negative for difficulty urinating and dysuria.   Musculoskeletal: Positive for back pain.   Neurological: Negative for dizziness, weakness, light-headedness, numbness and headaches.   Psychiatric/Behavioral: Positive for sleep disturbance. Negative for agitation.  The patient is not nervous/anxious.      I have reviewed and confirmed the accuracy of the ROS as documented by the MA/LPN/RN Tori Solitario MD      Vitals:    12/22/22 1034   Pulse: 72   Temp: 98.6 °F (37 °C)   SpO2: 100%   Weight: 130 kg (287 lb)   Height: 185.4 cm (73\")   PainSc:   7   PainLoc: Hip         Objective   Physical Exam  Vitals reviewed.   Constitutional:       General: He is not in acute distress.  Pulmonary:      Effort: Pulmonary effort is normal. No respiratory distress.   Musculoskeletal:      Comments: Ambulation: Without assistive device   Lumbar Exam:  Appearance: Scoliotic curve not appreciated and scarring absent  Palpated over lumbosacral paravertebral regions and transverse processes with negative tenderness appreciated, Bilateral.   Sacroiliac joints are not tender, Bilateral.  Trochanteric bursa are exquisitely tender, Left.  Straight leg raise is positive radiculopathy, Left.  Facet loading is negative for pain, Left.  Paraspinal/adjacent lumbar musculature are not tender to palpation, Left.   Skin:     General: Skin is warm and dry.   Neurological:      Mental Status: He is alert.   Psychiatric:         Mood and Affect: Mood normal.         Thought Content: Thought content normal.         Assessment & Plan   Diagnoses and all orders for this visit:    1. Left hip pain (Primary)    2. Trochanteric bursitis of left hip    3. Lumbar foraminal stenosis        - Pertinent labs reviewed.   - Pertinent imaging reviewed.   - Discussed left L4-5 Transforaminal epidural steroid injection.  Risks discussed including but not limited to bleeding, bruising, infection, damage to surrounding structures, headache, and rare things such as being paralyzed, seizure, stroke, heart attack and death.  The risk of steroid medications include but are not limited to immunosuppression, which can increase the risk of serafin an infectious disease as well as decrease the immune response to a vaccine.    - He  would like to consider his options and call us back if he wants to move forward with the epidural injection.  I also provided the option of performing a diagnostic only injection (without steroid) to help determine if this pain could be related to his foraminal stenosis.    - Dileep Hamilton reports a pain score of 7.  Given his pain assessment as noted, treatment options were discussed and the following options were decided upon as a follow-up plan to address the patient's pain: continuation of current treatment plan for pain.    --- Follow-up PRN         While examining this patient, I wore protective equipment including a mask and gloves.  I washed my hands before and after this patient encounter.  The patient wore a mask throughout the visit as well.     Tori Solitario MD  Pain Management    EMR Dragon/Transcription disclaimer:   Much of this encounter note is an electronic transcription/translation of spoken language to printed text. The electronic translation of spoken language may permit erroneous, or at times, nonsensical words or phrases to be inadvertently transcribed; Although I have reviewed the note for such errors, some may still exist.

## 2022-12-22 ENCOUNTER — OFFICE VISIT (OUTPATIENT)
Dept: PAIN MEDICINE | Facility: CLINIC | Age: 77
End: 2022-12-22

## 2022-12-22 VITALS
OXYGEN SATURATION: 100 % | TEMPERATURE: 98.6 F | HEIGHT: 73 IN | HEART RATE: 72 BPM | BODY MASS INDEX: 38.04 KG/M2 | WEIGHT: 287 LBS

## 2022-12-22 DIAGNOSIS — M48.061 LUMBAR FORAMINAL STENOSIS: ICD-10-CM

## 2022-12-22 DIAGNOSIS — M25.552 LEFT HIP PAIN: Primary | ICD-10-CM

## 2022-12-22 DIAGNOSIS — M70.62 TROCHANTERIC BURSITIS OF LEFT HIP: ICD-10-CM

## 2022-12-22 PROCEDURE — 99204 OFFICE O/P NEW MOD 45 MIN: CPT | Performed by: ANESTHESIOLOGY

## 2022-12-22 NOTE — PATIENT INSTRUCTIONS
What to expect if we're setting up an injection/procedure    - If you have any symptoms of an infection or of COVID, please reschedule your procedure.   - LIGHT Intravenous (IV) sedation is offered for some procedures: you will NOT be put to sleep.  If you plan to have sedation, do not eat or drink anything on the day of your injection.   - Most procedures require having someone drive you.  Please make sure you arrange a  unless told otherwise.   - If you take a blood thinner and you were not instructed whether to continue or hold it, please contact us with any questions.

## 2022-12-27 ENCOUNTER — TRANSCRIBE ORDERS (OUTPATIENT)
Dept: SURGERY | Facility: SURGERY CENTER | Age: 77
End: 2022-12-27

## 2022-12-27 ENCOUNTER — PREP FOR SURGERY (OUTPATIENT)
Dept: SURGERY | Facility: SURGERY CENTER | Age: 77
End: 2022-12-27

## 2022-12-27 ENCOUNTER — TELEPHONE (OUTPATIENT)
Dept: PAIN MEDICINE | Facility: CLINIC | Age: 77
End: 2022-12-27

## 2022-12-27 DIAGNOSIS — M54.16 LUMBAR RADICULITIS: Primary | ICD-10-CM

## 2022-12-27 DIAGNOSIS — M48.061 LUMBAR FORAMINAL STENOSIS: ICD-10-CM

## 2022-12-27 DIAGNOSIS — Z41.9 SURGERY, ELECTIVE: Primary | ICD-10-CM

## 2022-12-27 RX ORDER — SODIUM CHLORIDE 0.9 % (FLUSH) 0.9 %
10 SYRINGE (ML) INJECTION AS NEEDED
Status: CANCELLED | OUTPATIENT
Start: 2022-12-27

## 2022-12-27 RX ORDER — SODIUM CHLORIDE 0.9 % (FLUSH) 0.9 %
10 SYRINGE (ML) INJECTION EVERY 12 HOURS SCHEDULED
Status: CANCELLED | OUTPATIENT
Start: 2022-12-27

## 2022-12-27 NOTE — TELEPHONE ENCOUNTER
----- Message from Bouchra Brown sent at 12/27/2022 10:47 AM EST -----  Hi Dr. Solitario, this patient has decided to go forward with his epidural. Can you submit an order please? Thanks!

## 2022-12-28 NOTE — DISCHARGE INSTRUCTIONS
[facility] - Post-procedure Instructions          --  While there are no absolute restrictions, it is recommended that you do not perform strenuous activity today. In the morning, you may resume your level of activity as before your block.    --  If you have a band-aid at your injection site, please remove it later today. Observe the area for any redness, swelling, pus-like drainage, or a temperature over 101°. If any of these symptoms occur, please call your doctor at [phone]. If after office hours, leave a message and the on-call provider will return your call.    --  Ice may be applied to your injection site. It is recommended you avoid direct heat (heating pad; hot tub) for 1-2 days.    --  Call [facility] at [phone] if you experience persistent headache, persistent bleeding from the injection site, or severe pain not relieved by heat or oral medication.    --  Do not make important decisions today.    --  Due to the effects of the block and/or the I.V. Sedation, DO NOT drive or operate hazardous machinery for 12 hours.  Local anesthetics may cause numbness after procedure and precautions must be taken with regards to operating equipment as well as with walking, even if ambulating with assistance of another person or with an assistive device.    --  Do not drink alcohol for 12 hours.    -- You may return to work tomorrow, or as directed by your referring doctor.    --  Occasionally you may notice a slight increase in your pain after the procedure. This should start to improve within the next 24-48 hours. Radiofrequency ablation procedure pain may last 3-4 weeks.    --  It may take as long as 3-4 days before you notice a gradual improvement in your pain and/or other symptoms.    -- You may continue to take your prescribed pain medication as needed.    --  Some normal possible side effects of steroid use could include fluid retention, increased blood sugar, dull headache,  increased sweating, increased appetite, mood swings and flushing.    --  Diabetics are recommended to watch their blood glucose level closely for 24-48 hours after the injection.    --  Must stay in PACU for 20 min upon arrival and prove no leg weakness before being discharged.    --  IN THE EVENT OF A LIFE THREATENING EMERGENCY, (CHEST PAIN, BREATHING DIFFICULTIES, PARALYSIS…) YOU SHOULD GO TO YOUR NEAREST EMERGENCY ROOM.    --  You should be contacted by our office within 2-3 days to schedule follow up or next appointment date.  If not contacted within 7 days, please call the office at [phone]

## 2022-12-29 ENCOUNTER — HOSPITAL ENCOUNTER (OUTPATIENT)
Facility: SURGERY CENTER | Age: 77
Setting detail: HOSPITAL OUTPATIENT SURGERY
Discharge: HOME | End: 2022-12-29
Attending: ANESTHESIOLOGY | Admitting: ANESTHESIOLOGY
Payer: MEDICARE

## 2022-12-29 ENCOUNTER — HOSPITAL ENCOUNTER (OUTPATIENT)
Dept: GENERAL RADIOLOGY | Facility: SURGERY CENTER | Age: 77
Setting detail: HOSPITAL OUTPATIENT SURGERY
End: 2022-12-29
Payer: MEDICARE

## 2022-12-29 VITALS
SYSTOLIC BLOOD PRESSURE: 195 MMHG | BODY MASS INDEX: 38.87 KG/M2 | HEIGHT: 72 IN | DIASTOLIC BLOOD PRESSURE: 82 MMHG | OXYGEN SATURATION: 99 % | RESPIRATION RATE: 20 BRPM | WEIGHT: 287 LBS | HEART RATE: 51 BPM | TEMPERATURE: 98.7 F

## 2022-12-29 DIAGNOSIS — M48.061 LUMBAR FORAMINAL STENOSIS: ICD-10-CM

## 2022-12-29 DIAGNOSIS — Z41.9 SURGERY, ELECTIVE: ICD-10-CM

## 2022-12-29 DIAGNOSIS — M54.16 LUMBAR RADICULITIS: ICD-10-CM

## 2022-12-29 PROCEDURE — 64483 NJX AA&/STRD TFRM EPI L/S 1: CPT | Performed by: ANESTHESIOLOGY

## 2022-12-29 PROCEDURE — 25010000002 IOPAMIDOL 61 % SOLUTION 30 ML VIAL: Performed by: ANESTHESIOLOGY

## 2022-12-29 PROCEDURE — 76000 FLUOROSCOPY <1 HR PHYS/QHP: CPT

## 2022-12-29 PROCEDURE — 77002 NEEDLE LOCALIZATION BY XRAY: CPT

## 2022-12-29 RX ORDER — SODIUM CHLORIDE 0.9 % (FLUSH) 0.9 %
10 SYRINGE (ML) INJECTION EVERY 12 HOURS SCHEDULED
Status: DISCONTINUED | OUTPATIENT
Start: 2022-12-29 | End: 2022-12-29 | Stop reason: HOSPADM

## 2022-12-29 RX ORDER — SODIUM CHLORIDE 0.9 % (FLUSH) 0.9 %
10 SYRINGE (ML) INJECTION AS NEEDED
Status: DISCONTINUED | OUTPATIENT
Start: 2022-12-29 | End: 2022-12-29 | Stop reason: HOSPADM

## 2022-12-29 NOTE — OP NOTE
Lumbar Transforaminal Epidural Steroid Injection left L5-S1 Levels (termed L4-5 with a sacralized L5 on MRI, however this space is L5-S1 on X-ray counting from the bottom rib)  [facility]    PREOPERATIVE DIAGNOSIS:  Lumbar radicular pain, Lumbar radiculitis    POSTOPERATIVE DIAGNOSIS:  Same as preop diagnosis    PROCEDURE:    1. CPT 45374 -KX--  Diagnostic Transforaminal Epidural Steroid Injection at the L5 level, on the left     PRE-PROCEDURE DISCUSSION WITH PATIENT:    Risks and complications were discussed with the patient prior to starting the procedure and informed consent was obtained.  We discussed various topics including but not limited to bleeding, infection, injury, nerve injury, paralysis, coma, death, postprocedural painful flare-up, postprocedural site soreness, and a lack of pain relief.  We discussed the diagnostic aspect of transforaminal epidural / selective nerve root blockade.    SURGEON:  Tori Solitario MD    SEDATION:  Patient declined administration of moderate sedation    ANESTHETIC:  0.5% bupivacaine  STEROID:  None    DESCRIPTON OF PROCEDURE:  After obtaining informed consent, an I.V. was not started in the preoperative area. The patient taken to the operating room and was placed in the prone position with a pillow under the abdomen.  All pressure points were well padded.  EKG, blood pressure, and pulse oximeter were monitored.  The lumbar area was prepped with Chloraprep and draped in a sterile fashion.     The AP fluoroscopic image was used to visualize the L5 vertebral body.  The superior endplate was squared off radiographically.  The image was then obliqued towards the patient's left side to maximize visualization of the pedicle. The skin and subcutaneous tissue at the 6 o'clock position of the pedicle was anesthetized with 1% lidocaine. A 22-gauge spinal needle was then advanced percutaneously through the anesthetized skin tract under fluoroscopic guidance in AP  and lateral views until the needle tip lie in the josie-lateral aspect of the epidural space.  After negative aspiration of the needle for blood or CSF, a volume of 2 mL of Isovue was injected producing good epidural spread with no evidence of loculation, vascular run-off, or intrathecal spread. Subsequently, a volume of 3 mL of injectate was administered without resistance.  The needle was removed intact.  Vital signs were stable throughout.      The total volume injected consisted of 0.5 mL of 0.5% bupivacaine and preservative-free normal saline.       ESTIMATED BLOOD LOSS:  <5 mL  SPECIMENS:  none    COMPLICATIONS:   No complications were noted., There was no indication of vascular uptake on live injection of contrast dye. and There was no indication of intrathecal uptake on live injection of contrast dye.    TOLERANCE & DISCHARGE CONDITION:    The patient tolerated the procedure well.  The patient was transported to the recovery area without difficulties.  The patient was discharged to home under the care of family in stable and satisfactory condition.    PLAN OF CARE:  1. The patient was given our standard instruction sheet.  2. The patient will Return to clinic 1-2 wks.  3. The patient will resume all medications as per the medication reconciliation sheet.

## 2022-12-30 ENCOUNTER — TELEPHONE (OUTPATIENT)
Dept: PAIN MEDICINE | Facility: CLINIC | Age: 77
End: 2022-12-30

## 2022-12-30 DIAGNOSIS — N40.0 BENIGN PROSTATIC HYPERPLASIA WITHOUT LOWER URINARY TRACT SYMPTOMS: ICD-10-CM

## 2022-12-30 DIAGNOSIS — Z12.5 ENCOUNTER FOR SCREENING FOR MALIGNANT NEOPLASM OF PROSTATE: ICD-10-CM

## 2022-12-30 DIAGNOSIS — I10 ESSENTIAL HYPERTENSION: Primary | ICD-10-CM

## 2022-12-30 DIAGNOSIS — E11.9 CONTROLLED TYPE 2 DIABETES MELLITUS WITHOUT COMPLICATION, WITHOUT LONG-TERM CURRENT USE OF INSULIN: ICD-10-CM

## 2022-12-30 DIAGNOSIS — E78.2 MIXED HYPERLIPIDEMIA: ICD-10-CM

## 2022-12-30 NOTE — TELEPHONE ENCOUNTER
Okay.  If he had not even 1-2 hours of relief, his back isn't causing the pain in that area.  We should be calling him to schedule a follow-up soon.

## 2022-12-30 NOTE — TELEPHONE ENCOUNTER
Caller: Dileep Hamilton    Relationship: Self    Best call back number:      Who are you requesting to speak with (clinical staff, provider,  specific staff member): DR AN/CLINICAL    What was the call regarding: THE PATIENT STATED HIS EPIDURAL ON 12/29/22 DID NOT WORK AND HAS NOT HELP RELIEVE HIS PAIN.     Do you require a callback: YES

## 2023-01-04 DIAGNOSIS — I10 ESSENTIAL HYPERTENSION: ICD-10-CM

## 2023-01-04 DIAGNOSIS — E11.9 CONTROLLED TYPE 2 DIABETES MELLITUS WITHOUT COMPLICATION, WITHOUT LONG-TERM CURRENT USE OF INSULIN: ICD-10-CM

## 2023-01-04 DIAGNOSIS — E78.2 MIXED HYPERLIPIDEMIA: ICD-10-CM

## 2023-01-04 DIAGNOSIS — N40.0 BENIGN PROSTATIC HYPERPLASIA WITHOUT LOWER URINARY TRACT SYMPTOMS: ICD-10-CM

## 2023-01-04 NOTE — PROGRESS NOTES
The patient has a pain history of the following:  Lumbar radiculopathy  Left hip pain     Previous interventions that the patient has received include:   Left L5-S1 Transforaminal epidural steroid injection (termed L4-5 on MRI with a sacralized L5, however L5-S1 on x-ray counting from 12th rib)  Left greater trochanteric bursa injection-4/5/22, 2/3/22, 11/28/21, 11/10/21 Dr. Powell temporary relief  Left intra-articular hip injection 6/17/22 -temporary relief  PRP injection in the left hip 8/11/22     Pain medications include:  None      Previously: Tylenol 3 - no improvement, Aleve, Voltaren gel, Medrol Dosepak (temporary relief), Advil      Other conservative modalities which the patient reports using include:  Physical Therapy: yes  Chiropractor: no  Massage Therapy: yes  TENS: yes  Neck or back surgery: no  Past pain management: no  Shoe lift- no improvement   Heat  Ice  Gel seat pad helps some     Past Significant Surgical History:  None     HPI:     CHIEF COMPLAINT Back Pain  F/U back pain- LUMBAR/SACRAL TRANSFORAMINAL EPIDURAL Left L4-5- patient states that he had no improvement from the procedure.     Subjective   Dileep Hamilton is a 77 y.o. male  who presents to the office for follow-up of procedure.  He completed a left Transforaminal epidural steroid injection  for management of possible radiculitis. Patient reports 0% relief from the procedure.     History of Present Illness  Mr. Hamilton states that he did not have even temporary relief after his left lumbar transforaminal epidural injection with local only.  He states that he continued to have the pain when he was leaving the facility and walking to his car.  He then stopped at a restaurant that was approximately 10 minutes away.  He had pain when he was walking into the restaurant and whenever he was leaving the restaurant.       PEG Assessment   What number best describes your pain on average in the past week?6  What number best describes how, during  the past week, pain has interfered with your enjoyment of life?8  What number best describes how, during the past week, pain has interfered with your general activity?  8    REVIEW OF PERTINENT MEDICAL DATA  No new    The following portions of the patient's history were reviewed and updated as appropriate: allergies, current medications, past family history, past medical history, past social history, past surgical history and problem list.    Review of Systems   Constitutional: Positive for activity change (decreased). Negative for chills, fatigue and fever.   HENT: Negative for congestion.    Eyes: Negative for visual disturbance.   Respiratory: Negative for chest tightness and shortness of breath.    Cardiovascular: Negative for chest pain.   Gastrointestinal: Negative for abdominal pain, constipation and diarrhea.   Genitourinary: Negative for difficulty urinating and dysuria.   Musculoskeletal: Positive for back pain.   Neurological: Negative for dizziness, weakness, light-headedness, numbness and headaches.   Psychiatric/Behavioral: Positive for sleep disturbance. Negative for agitation. The patient is not nervous/anxious.        Vitals:    01/05/23 0740   BP: (!) 194/82   Pulse: 61   Temp: 98.2 °F (36.8 °C)   SpO2: 99%   Weight: 129 kg (284 lb 9.6 oz)   Height: 182.9 cm (72\")   PainSc:   6   PainLoc: Hip  Comment: left         Objective   Physical Exam  Vitals reviewed.   Constitutional:       General: He is not in acute distress.  Pulmonary:      Effort: Pulmonary effort is normal. No respiratory distress.   Neurological:      Mental Status: He is alert.   Psychiatric:         Mood and Affect: Mood normal.         Thought Content: Thought content normal.             Assessment & Plan   Diagnoses and all orders for this visit:    1. Trochanteric bursitis of left hip (Primary)    2. Left hip pain        -With his tenderness to palpation along the lateral aspect of the hip/the region of the greater trochanteric  bursa, as well as with no diagnostic improvement after left lumbar transforaminal epidural/selective nerve root block, I think his pain is unlikely stemming from his back.  - Dileep Hamilton reports a pain score of 6.  Given his pain assessment as noted, treatment options were discussed and the following options were decided upon as a follow-up plan to address the patient's pain: continuation of current treatment plan for pain.  - Patient's blood pressure is elevated today in clinic. Patient denies s/s associated with HTN, including: shortness of breath, headache, blurry vision, chest pain, lightheadedness. Educated patient that if they experience any of these symptoms, they should report to the ED. Encouraged patient to follow regarding HTN with PCP.  He will see Dr. Rubi tomorrow.     --- Follow-up PRN           While examining this patient, I wore protective equipment including a mask, eye sheild and gloves.  I washed my hands before and after this patient encounter.  The patient wore a mask throughout the visit as well.     Tori Solitario MD  Pain Management

## 2023-01-05 ENCOUNTER — OFFICE VISIT (OUTPATIENT)
Dept: PAIN MEDICINE | Facility: CLINIC | Age: 78
End: 2023-01-05
Payer: MEDICARE

## 2023-01-05 ENCOUNTER — TELEPHONE (OUTPATIENT)
Dept: ORTHOPEDIC SURGERY | Facility: CLINIC | Age: 78
End: 2023-01-05
Payer: MEDICARE

## 2023-01-05 VITALS
WEIGHT: 284.6 LBS | SYSTOLIC BLOOD PRESSURE: 194 MMHG | DIASTOLIC BLOOD PRESSURE: 82 MMHG | HEIGHT: 72 IN | HEART RATE: 61 BPM | BODY MASS INDEX: 38.55 KG/M2 | OXYGEN SATURATION: 99 % | TEMPERATURE: 98.2 F

## 2023-01-05 DIAGNOSIS — M25.552 LEFT HIP PAIN: ICD-10-CM

## 2023-01-05 DIAGNOSIS — M70.62 TROCHANTERIC BURSITIS OF LEFT HIP: Primary | ICD-10-CM

## 2023-01-05 PROCEDURE — 99212 OFFICE O/P EST SF 10 MIN: CPT | Performed by: ANESTHESIOLOGY

## 2023-01-05 NOTE — TELEPHONE ENCOUNTER
Patient called and states he did not get much relief from the lumbar epidural and he would like to know what the next step towards surgery would be.

## 2023-01-06 ENCOUNTER — OFFICE VISIT (OUTPATIENT)
Dept: INTERNAL MEDICINE | Facility: CLINIC | Age: 78
End: 2023-01-06
Payer: MEDICARE

## 2023-01-06 VITALS
HEART RATE: 60 BPM | TEMPERATURE: 98.3 F | WEIGHT: 279.8 LBS | DIASTOLIC BLOOD PRESSURE: 78 MMHG | OXYGEN SATURATION: 99 % | SYSTOLIC BLOOD PRESSURE: 156 MMHG | BODY MASS INDEX: 37.9 KG/M2 | HEIGHT: 72 IN

## 2023-01-06 DIAGNOSIS — C44.92 SQUAMOUS CELL CARCINOMA OF SKIN: ICD-10-CM

## 2023-01-06 DIAGNOSIS — E66.01 MORBID OBESITY DUE TO EXCESS CALORIES: ICD-10-CM

## 2023-01-06 DIAGNOSIS — M48.061 LUMBAR FORAMINAL STENOSIS: ICD-10-CM

## 2023-01-06 DIAGNOSIS — I10 UNCONTROLLED HYPERTENSION: ICD-10-CM

## 2023-01-06 DIAGNOSIS — Z00.00 ENCOUNTER FOR SUBSEQUENT ANNUAL WELLNESS VISIT (AWV) IN MEDICARE PATIENT: Primary | ICD-10-CM

## 2023-01-06 DIAGNOSIS — D72.820 LYMPHOCYTOSIS: ICD-10-CM

## 2023-01-06 DIAGNOSIS — I87.2 VENOUS STASIS DERMATITIS OF BOTH LOWER EXTREMITIES: ICD-10-CM

## 2023-01-06 DIAGNOSIS — I35.9 AORTIC VALVE CALCIFICATION: ICD-10-CM

## 2023-01-06 DIAGNOSIS — E11.9 CONTROLLED TYPE 2 DIABETES MELLITUS WITHOUT COMPLICATION, WITHOUT LONG-TERM CURRENT USE OF INSULIN: ICD-10-CM

## 2023-01-06 DIAGNOSIS — Z00.01 ENCOUNTER FOR GENERAL ADULT MEDICAL EXAMINATION WITH ABNORMAL FINDINGS: ICD-10-CM

## 2023-01-06 DIAGNOSIS — I10 ESSENTIAL HYPERTENSION: ICD-10-CM

## 2023-01-06 DIAGNOSIS — M16.0 PRIMARY OSTEOARTHRITIS OF BOTH HIPS: ICD-10-CM

## 2023-01-06 DIAGNOSIS — E55.9 VITAMIN D DEFICIENCY: ICD-10-CM

## 2023-01-06 DIAGNOSIS — N40.0 BENIGN PROSTATIC HYPERPLASIA WITHOUT LOWER URINARY TRACT SYMPTOMS: ICD-10-CM

## 2023-01-06 DIAGNOSIS — C18.9 MALIGNANT NEOPLASM OF COLON, UNSPECIFIED PART OF COLON: ICD-10-CM

## 2023-01-06 DIAGNOSIS — E78.2 MIXED HYPERLIPIDEMIA: ICD-10-CM

## 2023-01-06 PROBLEM — M54.16 LUMBAR RADICULITIS: Status: RESOLVED | Noted: 2022-12-27 | Resolved: 2023-01-06

## 2023-01-06 PROCEDURE — 3044F HG A1C LEVEL LT 7.0%: CPT | Performed by: INTERNAL MEDICINE

## 2023-01-06 PROCEDURE — 1125F AMNT PAIN NOTED PAIN PRSNT: CPT | Performed by: INTERNAL MEDICINE

## 2023-01-06 PROCEDURE — 99214 OFFICE O/P EST MOD 30 MIN: CPT | Performed by: INTERNAL MEDICINE

## 2023-01-06 PROCEDURE — 1170F FXNL STATUS ASSESSED: CPT | Performed by: INTERNAL MEDICINE

## 2023-01-06 PROCEDURE — G0439 PPPS, SUBSEQ VISIT: HCPCS | Performed by: INTERNAL MEDICINE

## 2023-01-06 PROCEDURE — 1159F MED LIST DOCD IN RCRD: CPT | Performed by: INTERNAL MEDICINE

## 2023-01-06 RX ORDER — OLMESARTAN MEDOXOMIL 40 MG/1
40 TABLET ORAL DAILY
Qty: 90 TABLET | Refills: 1 | Status: SHIPPED | OUTPATIENT
Start: 2023-01-06 | End: 2023-03-23 | Stop reason: SDUPTHER

## 2023-01-06 RX ORDER — AMLODIPINE BESYLATE 5 MG/1
5 TABLET ORAL DAILY
Qty: 90 TABLET | Refills: 1 | Status: SHIPPED | OUTPATIENT
Start: 2023-01-06 | End: 2023-03-03 | Stop reason: SDUPTHER

## 2023-01-06 RX ORDER — PREDNISOLONE ACETATE 10 MG/ML
SUSPENSION/ DROPS OPHTHALMIC
COMMUNITY
Start: 2022-10-21 | End: 2023-01-06

## 2023-01-06 RX ORDER — KETOROLAC TROMETHAMINE 4 MG/ML
SOLUTION/ DROPS OPHTHALMIC
COMMUNITY
Start: 2022-10-21 | End: 2023-01-06

## 2023-01-09 ENCOUNTER — TELEPHONE (OUTPATIENT)
Dept: INTERNAL MEDICINE | Facility: CLINIC | Age: 78
End: 2023-01-09

## 2023-01-09 DIAGNOSIS — G89.29 CHRONIC LEFT HIP PAIN: ICD-10-CM

## 2023-01-09 DIAGNOSIS — M16.0 PRIMARY OSTEOARTHRITIS OF BOTH HIPS: Primary | ICD-10-CM

## 2023-01-09 DIAGNOSIS — M25.552 CHRONIC LEFT HIP PAIN: ICD-10-CM

## 2023-01-09 DIAGNOSIS — M70.62 TROCHANTERIC BURSITIS OF LEFT HIP: ICD-10-CM

## 2023-01-09 NOTE — TELEPHONE ENCOUNTER
Caller: Dileep Hamilton    Relationship: Self    Best call back number: [phone]    Who are you requesting to speak with (clinical staff, provider,  specific staff member): DR VILLAVICENCIO    What was the call regarding: PATIENT STATED THAT AT HIS LAST VISIT 01-, HE DISCUSSED A POSSIBLE REFERRAL TO A DR TELLEZ IN ORTHOPEDICS FOR HIS HIP WITH DR VILLAVICENCIO.    PATIENT STATED HE WOULD LIKE TO MOVE FORWARD WITH THIS REFERRAL AND SEE DR TELLEZ.    Do you require a callback: NOT NEEDED

## 2023-01-10 DIAGNOSIS — M51.36 LUMBAR DEGENERATIVE DISC DISEASE: ICD-10-CM

## 2023-01-10 DIAGNOSIS — M54.16 LUMBAR RADICULOPATHY: Primary | ICD-10-CM

## 2023-01-13 ENCOUNTER — TELEPHONE (OUTPATIENT)
Dept: ORTHOPEDIC SURGERY | Facility: CLINIC | Age: 78
End: 2023-01-13
Payer: MEDICARE

## 2023-01-13 NOTE — TELEPHONE ENCOUNTER
Lauren    Re: Neurosurgeon referral    Per Dr. Collins's office: he is scheduled out until June, can we schedule patient with Dr. Thayer?     (I wanted to get your go ahead before changing referral to Dr. Thayer)     Please advise.

## 2023-01-19 RX ORDER — LOSARTAN POTASSIUM 100 MG/1
TABLET ORAL
Qty: 90 TABLET | Refills: 2 | OUTPATIENT
Start: 2023-01-19

## 2023-01-25 DIAGNOSIS — I10 ESSENTIAL HYPERTENSION: ICD-10-CM

## 2023-01-25 DIAGNOSIS — E78.2 MIXED HYPERLIPIDEMIA: ICD-10-CM

## 2023-01-25 DIAGNOSIS — E55.9 VITAMIN D DEFICIENCY: Primary | ICD-10-CM

## 2023-01-25 DIAGNOSIS — E11.9 CONTROLLED TYPE 2 DIABETES MELLITUS WITHOUT COMPLICATION, WITHOUT LONG-TERM CURRENT USE OF INSULIN: ICD-10-CM

## 2023-01-26 ENCOUNTER — OFFICE VISIT (OUTPATIENT)
Dept: ORTHOPEDIC SURGERY | Facility: CLINIC | Age: 78
End: 2023-01-26
Payer: MEDICARE

## 2023-01-26 VITALS — HEIGHT: 72 IN | TEMPERATURE: 97.8 F | BODY MASS INDEX: 37.79 KG/M2 | WEIGHT: 279 LBS

## 2023-01-26 DIAGNOSIS — M70.72 BURSITIS OF LEFT HIP, UNSPECIFIED BURSA: Primary | ICD-10-CM

## 2023-01-26 PROCEDURE — 99213 OFFICE O/P EST LOW 20 MIN: CPT | Performed by: ORTHOPAEDIC SURGERY

## 2023-01-26 NOTE — PROGRESS NOTES
Patient: Dileep Hamilton  YOB: 1945 77 y.o. male  Medical Record Number: [id number]    Chief Complaint:   Chief Complaint   Patient presents with   • Lower Back - Follow-up       History of Present Illness:Dileep Hamilton is a 77 y.o. male who presents for follow-up of left hip pain.  Patient's been seen previously diagnosed with left hip greater trochanter bursitis.  He is tried multiple conservative treatments injections therapy all which is failed.  Denies any groin pain.  He was also sent for evaluation by pain management and spine who sent him for these injections which he stated did not help.  Pain management stated that they felt it was his hip.  He is also been seen by sports medicine who felt it was his hip particular greater enteric bursitis.  Patient then got sent for a second opinion for his hip to Dr. Iyer who felt it was more his back.  Patient states he did not have a great of experience with that opinion.  Is here today for further recommendations.    Allergies:   Allergies   Allergen Reactions   • Statins        Medications:   Current Outpatient Medications   Medication Sig Dispense Refill   • amLODIPine (NORVASC) 5 MG tablet Take 1 tablet by mouth Daily. 90 tablet 1   • Cholecalciferol (VITAMIN D3) 20 MCG (800 UNIT) tablet Take 800 Int'l Units by mouth Daily. 75 tablet    • Coenzyme Q10 (COQ-10) 100 MG capsule Take  by mouth.     • Misc Natural Products (Osteo Bi-Flex Adv Triple St) tablet      • Multiple Vitamin (MULTI VITAMIN DAILY PO) Take  by mouth.     • olmesartan (BENICAR) 40 MG tablet Take 1 tablet by mouth Daily. 90 tablet 1   • Omega-3 Fatty Acids (FISH OIL) 1000 MG capsule capsule Take  by mouth.       No current facility-administered medications for this visit.         The following portions of the patient's history were reviewed and updated as appropriate: allergies, current medications, past family history, past medical history, past social history, past surgical history  "and problem list.    Review of Systems:   A 14 point review of systems was performed. All systems negative except pertinent positives/negative listed in HPI above    Physical Exam:   Vitals:    01/26/23 0950   Temp: 97.8 °F (36.6 °C)   Weight: 127 kg (279 lb)   Height: 182.9 cm (72\")       General: A and O x 3, ASA, NAD    SCLERA:    Normal    DENTITION:   Normal  Left lower extremity is examined he still has tenderness over the greater trochanter.  Hip range of motion full some discomfort.  Also some discomfort with straight leg raise.  Motor and sensory intact distally.      Assessment/Plan:  Left hip pain, greater trochanter bursitis    Discussed the findings with the patient I feel most of his symptoms seem to be stemming from the bursa.  He does have some back degenerative changes hip degenerative changes however he has been evaluated for his back injections that failed.  The gel change in the hip and on for like of the driving factor here.  He has been treated for bursitis initially things seem to help some but never really gone away has been, quite painful and debilitating.  He was sent for a second opinion to another surgeon who specializes in this area and did not feel it was the bursitis but his back.  I would like to send him for another second opinion as the only other option here if this is what we are dealing with would be a surgery particularly a bursectomy with release of the IT band.  I will send him to Dr. Eric Yañez for further evaluation he will report back and let us know what Dr. Yañez's thoughts are.  Dr. Yañez has more expertise in this area the patient wishes to stick with him moving forward that is fine otherwise we are happy to see him back.  All questions were answered.      Major Aviles MD  1/26/2023    "

## 2023-01-28 LAB
25(OH)D3+25(OH)D2 SERPL-MCNC: 38.4 NG/ML (ref 30–100)
ALBUMIN/CREAT UR: 156 MG/G CREAT (ref 0–29)
BASOPHILS # BLD AUTO: 0.04 10*3/MM3 (ref 0–0.2)
BASOPHILS NFR BLD AUTO: 0.4 % (ref 0–1.5)
BUN SERPL-MCNC: 46 MG/DL (ref 8–23)
BUN/CREAT SERPL: 31.9 (ref 7–25)
CALCIUM SERPL-MCNC: 9.7 MG/DL (ref 8.6–10.5)
CHLORIDE SERPL-SCNC: 104 MMOL/L (ref 98–107)
CO2 SERPL-SCNC: 25.7 MMOL/L (ref 22–29)
CREAT SERPL-MCNC: 1.44 MG/DL (ref 0.76–1.27)
CREAT UR-MCNC: 79.8 MG/DL
EGFRCR SERPLBLD CKD-EPI 2021: 50.7 ML/MIN/1.73
EOSINOPHIL # BLD AUTO: 0.19 10*3/MM3 (ref 0–0.4)
EOSINOPHIL NFR BLD AUTO: 1.8 % (ref 0.3–6.2)
ERYTHROCYTE [DISTWIDTH] IN BLOOD BY AUTOMATED COUNT: 13 % (ref 12.3–15.4)
GLUCOSE SERPL-MCNC: 126 MG/DL (ref 65–99)
HCT VFR BLD AUTO: 42.1 % (ref 37.5–51)
HGB BLD-MCNC: 14 G/DL (ref 13–17.7)
IMM GRANULOCYTES # BLD AUTO: 0.02 10*3/MM3 (ref 0–0.05)
IMM GRANULOCYTES NFR BLD AUTO: 0.2 % (ref 0–0.5)
LYMPHOCYTES # BLD AUTO: 4.54 10*3/MM3 (ref 0.7–3.1)
LYMPHOCYTES NFR BLD AUTO: 42.5 % (ref 19.6–45.3)
MCH RBC QN AUTO: 30.1 PG (ref 26.6–33)
MCHC RBC AUTO-ENTMCNC: 33.3 G/DL (ref 31.5–35.7)
MCV RBC AUTO: 90.5 FL (ref 79–97)
MICROALBUMIN UR-MCNC: 124.8 UG/ML
MONOCYTES # BLD AUTO: 0.66 10*3/MM3 (ref 0.1–0.9)
MONOCYTES NFR BLD AUTO: 6.2 % (ref 5–12)
NEUTROPHILS # BLD AUTO: 5.22 10*3/MM3 (ref 1.7–7)
NEUTROPHILS NFR BLD AUTO: 48.9 % (ref 42.7–76)
NRBC BLD AUTO-RTO: 0.1 /100 WBC (ref 0–0.2)
PLATELET # BLD AUTO: 318 10*3/MM3 (ref 140–450)
POTASSIUM SERPL-SCNC: 4.8 MMOL/L (ref 3.5–5.2)
RBC # BLD AUTO: 4.65 10*6/MM3 (ref 4.14–5.8)
SODIUM SERPL-SCNC: 140 MMOL/L (ref 136–145)
WBC # BLD AUTO: 10.67 10*3/MM3 (ref 3.4–10.8)

## 2023-02-01 ENCOUNTER — TELEPHONE (OUTPATIENT)
Dept: ORTHOPEDIC SURGERY | Facility: CLINIC | Age: 78
End: 2023-02-01

## 2023-02-01 NOTE — TELEPHONE ENCOUNTER
Referral faxed on 01/27/2023 and 01/30/2023 and now 02/01/2023     Will contact patient once fax confirmation received

## 2023-02-01 NOTE — TELEPHONE ENCOUNTER
Provider: DR NOVA    Caller: GORDON JOLLY    Relationship to Patient: SELF    Phone Number: [phone]    Reason for Call: PT CALLED DR RYAN MCMULLEN'S OFFICE TO SET UP AN APPT, BUT THEY STILL HAVE NOT RECEIVED THE REFERRAL FAX. PLEASE FAX THIS OVER TO THEM. PT WOULD LIKE A CALL BACK ONCE THIS HAS BEEN DONE.

## 2023-02-02 ENCOUNTER — OFFICE VISIT (OUTPATIENT)
Dept: INTERNAL MEDICINE | Facility: CLINIC | Age: 78
End: 2023-02-02
Payer: MEDICARE

## 2023-02-02 VITALS
DIASTOLIC BLOOD PRESSURE: 68 MMHG | BODY MASS INDEX: 38.54 KG/M2 | OXYGEN SATURATION: 99 % | HEART RATE: 64 BPM | SYSTOLIC BLOOD PRESSURE: 140 MMHG | TEMPERATURE: 98.4 F | HEIGHT: 72 IN | WEIGHT: 284.5 LBS

## 2023-02-02 DIAGNOSIS — E78.2 MIXED HYPERLIPIDEMIA: ICD-10-CM

## 2023-02-02 DIAGNOSIS — D72.820 LYMPHOCYTOSIS: ICD-10-CM

## 2023-02-02 DIAGNOSIS — I10 ESSENTIAL HYPERTENSION: ICD-10-CM

## 2023-02-02 DIAGNOSIS — I10 UNCONTROLLED HYPERTENSION: Primary | ICD-10-CM

## 2023-02-02 DIAGNOSIS — E66.01 MORBID OBESITY DUE TO EXCESS CALORIES: ICD-10-CM

## 2023-02-02 PROCEDURE — 99214 OFFICE O/P EST MOD 30 MIN: CPT | Performed by: INTERNAL MEDICINE

## 2023-02-02 RX ORDER — CHLORTHALIDONE 25 MG/1
25 TABLET ORAL DAILY
Qty: 90 TABLET | Refills: 2 | Status: SHIPPED | OUTPATIENT
Start: 2023-02-02 | End: 2023-03-03
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
## 2023-02-02 NOTE — TELEPHONE ENCOUNTER
02/02/2023 @ 9:34am M letting Mr. Hamilton know referral has been faxed and we have received fax confirmation and I have reached out to Dr. Yañez's office letting them know we received fax confirmation and to please be on the look out for referral

## 2023-02-06 LAB
BASOPHILS # BLD AUTO: 0 X10E3/UL (ref 0–0.2)
BASOPHILS NFR BLD AUTO: 0 %
EOSINOPHIL # BLD AUTO: 0.2 X10E3/UL (ref 0–0.4)
EOSINOPHIL NFR BLD AUTO: 2 %
ERYTHROCYTE [DISTWIDTH] IN BLOOD BY AUTOMATED COUNT: 13.5 % (ref 11.6–15.4)
HCT VFR BLD AUTO: 42 % (ref 37.5–51)
HGB BLD-MCNC: 14.2 G/DL (ref 13–17.7)
IMM GRANULOCYTES # BLD AUTO: 0 X10E3/UL (ref 0–0.1)
IMM GRANULOCYTES NFR BLD AUTO: 0 %
LYMPHOCYTES # BLD AUTO: 3.9 X10E3/UL (ref 0.7–3.1)
LYMPHOCYTES NFR BLD AUTO: 35 %
MCH RBC QN AUTO: 30.3 PG (ref 26.6–33)
MCHC RBC AUTO-ENTMCNC: 33.8 G/DL (ref 31.5–35.7)
MCV RBC AUTO: 90 FL (ref 79–97)
MONOCYTES # BLD AUTO: 0.7 X10E3/UL (ref 0.1–0.9)
MONOCYTES NFR BLD AUTO: 6 %
NEUTROPHILS # BLD AUTO: 6.3 X10E3/UL (ref 1.4–7)
NEUTROPHILS NFR BLD AUTO: 57 %
PATH INTERP BLD-IMP: ABNORMAL
PATH REV BLD -IMP: ABNORMAL
PATHOLOGIST NAME: ABNORMAL
PLATELET # BLD AUTO: 319 X10E3/UL (ref 150–450)
RBC # BLD AUTO: 4.69 X10E6/UL (ref 4.14–5.8)
WBC # BLD AUTO: 11.1 X10E3/UL (ref 3.4–10.8)

## 2023-02-10 DIAGNOSIS — D72.820 LYMPHOCYTOSIS: Primary | ICD-10-CM

## 2023-02-28 DIAGNOSIS — I10 ESSENTIAL HYPERTENSION: Primary | ICD-10-CM

## 2023-02-28 DIAGNOSIS — E78.2 MIXED HYPERLIPIDEMIA: ICD-10-CM

## 2023-03-01 LAB
BUN SERPL-MCNC: 51 MG/DL (ref 8–23)
BUN/CREAT SERPL: 27.9 (ref 7–25)
CALCIUM SERPL-MCNC: 10 MG/DL (ref 8.6–10.5)
CHLORIDE SERPL-SCNC: 100 MMOL/L (ref 98–107)
CO2 SERPL-SCNC: 27.3 MMOL/L (ref 22–29)
CREAT SERPL-MCNC: 1.83 MG/DL (ref 0.76–1.27)
EGFRCR SERPLBLD CKD-EPI 2021: 38 ML/MIN/1.73
GLUCOSE SERPL-MCNC: 125 MG/DL (ref 65–99)
POTASSIUM SERPL-SCNC: 4.7 MMOL/L (ref 3.5–5.2)
SODIUM SERPL-SCNC: 137 MMOL/L (ref 136–145)

## 2023-03-03 ENCOUNTER — OFFICE VISIT (OUTPATIENT)
Dept: INTERNAL MEDICINE | Facility: CLINIC | Age: 78
End: 2023-03-03
Payer: MEDICARE

## 2023-03-03 VITALS
TEMPERATURE: 97.8 F | BODY MASS INDEX: 37.88 KG/M2 | HEIGHT: 72 IN | OXYGEN SATURATION: 98 % | WEIGHT: 279.7 LBS | DIASTOLIC BLOOD PRESSURE: 62 MMHG | HEART RATE: 57 BPM | SYSTOLIC BLOOD PRESSURE: 128 MMHG

## 2023-03-03 DIAGNOSIS — N17.9 ACUTE KIDNEY INJURY: ICD-10-CM

## 2023-03-03 DIAGNOSIS — I10 ESSENTIAL HYPERTENSION: Primary | ICD-10-CM

## 2023-03-03 DIAGNOSIS — D72.820 LYMPHOCYTOSIS: ICD-10-CM

## 2023-03-03 DIAGNOSIS — I10 UNCONTROLLED HYPERTENSION: ICD-10-CM

## 2023-03-03 LAB
BUN SERPL-MCNC: 49 MG/DL (ref 8–23)
BUN/CREAT SERPL: 30.4 (ref 7–25)
CALCIUM SERPL-MCNC: 9.8 MG/DL (ref 8.6–10.5)
CHLORIDE SERPL-SCNC: 104 MMOL/L (ref 98–107)
CO2 SERPL-SCNC: 26.2 MMOL/L (ref 22–29)
CREAT SERPL-MCNC: 1.61 MG/DL (ref 0.76–1.27)
EGFRCR SERPLBLD CKD-EPI 2021: 44.3 ML/MIN/1.73
GLUCOSE SERPL-MCNC: 132 MG/DL (ref 65–99)
POTASSIUM SERPL-SCNC: 4.7 MMOL/L (ref 3.5–5.2)
SODIUM SERPL-SCNC: 141 MMOL/L (ref 136–145)

## 2023-03-03 PROCEDURE — 99214 OFFICE O/P EST MOD 30 MIN: CPT | Performed by: INTERNAL MEDICINE

## 2023-03-03 RX ORDER — AMLODIPINE BESYLATE 5 MG/1
10 TABLET ORAL DAILY
Qty: 90 TABLET | Refills: 1
Start: 2023-03-03 | End: 2023-03-23 | Stop reason: SDUPTHER

## 2023-03-03 NOTE — PROGRESS NOTES
Hypertension (4 week f/u)      HPI  Dileep Hamilton is a 77 y.o. male RTC In short interval f/u:  1. Severe L lateral hip pain, Mild arthritic changes of both hips without hip fracture and Chronic bilateral hamstring origin tendinopathy.- issues > 1 year now. S/P eval with sports med, ortho and pain mgmnt. Has appt with Dr. Yañez at [facility] upcoming to address.   2. HTN - has been checking at home with reasonable numbers, but still getting systolics ~140 range. Tolerated diuretic addition well, no issues.  Saw labs and knows Cr is up, wife very upset about issue.    3. Lymphocytosis - noted on CBC, progressive last labs with abnormal manual smear. Has Heme appt upcoming.     Review of Systems   Constitutional: Negative for chills, fever and malaise/fatigue.   Eyes: Negative for blurred vision.   Cardiovascular: Positive for leg swelling (baseline, mild, sockline edema). Negative for dyspnea on exertion, near-syncope, orthopnea, palpitations, paroxysmal nocturnal dyspnea and syncope.   Respiratory: Negative for shortness of breath.    Genitourinary: Negative for dysuria and hematuria.   Neurological: Negative for dizziness, headaches and light-headedness.       The following portions of the patient's history were reviewed and updated as appropriate: allergies, current medications, past medical history, past social history and problem list.      Current Outpatient Medications:   •  amLODIPine (NORVASC) 5 MG tablet, Take 2 tablets by mouth Daily., Disp: 90 tablet, Rfl: 1  •  Cholecalciferol (VITAMIN D3) 20 MCG (800 UNIT) tablet, Take 800 Int'l Units by mouth Daily., Disp: 75 tablet, Rfl:   •  Coenzyme Q10 (COQ-10) 100 MG capsule, Take  by mouth., Disp: , Rfl:   •  Misc Natural Products (Osteo Bi-Flex Adv Triple St) tablet, , Disp: , Rfl:   •  Multiple Vitamin (MULTI VITAMIN DAILY PO), Take  by mouth., Disp: , Rfl:   •  olmesartan (BENICAR) 40 MG tablet, Take 1 tablet by mouth Daily., Disp: 90 tablet, Rfl: 1  •  Omega-3  "Fatty Acids (FISH OIL) 1000 MG capsule capsule, Take  by mouth., Disp: , Rfl:     Vitals:    03/03/23 0819   BP: 128/62   BP Location: Left arm   Patient Position: Sitting   Cuff Size: Adult   Pulse: 57   Temp: 97.8 °F (36.6 °C)   TempSrc: Oral   SpO2: 98%   Weight: 127 kg (279 lb 11.2 oz)   Height: 182.9 cm (72\")     Body mass index is 37.93 kg/m².      Physical Exam  Vitals reviewed.   Constitutional:       General: He is not in acute distress.     Appearance: He is well-developed. He is obese. He is not ill-appearing or toxic-appearing.   HENT:      Head: Normocephalic and atraumatic.      Mouth/Throat:      Mouth: No oral lesions.      Tongue: No lesions.      Pharynx: No pharyngeal swelling or uvula swelling.   Eyes:      General: No scleral icterus.  Neck:      Vascular: No carotid bruit.   Cardiovascular:      Rate and Rhythm: Normal rate and regular rhythm.      Pulses:           Carotid pulses are 2+ on the right side and 2+ on the left side.       Radial pulses are 2+ on the right side and 2+ on the left side.      Heart sounds: Murmur heard.    Systolic murmur is present with a grade of 3/6.  Pulmonary:      Effort: Pulmonary effort is normal. No respiratory distress.      Breath sounds: Normal breath sounds. No wheezing, rhonchi or rales.   Abdominal:      General: Abdomen is protuberant. Bowel sounds are normal.      Palpations: Abdomen is soft.      Tenderness: There is no abdominal tenderness.      Comments: No renal artery bruits noted B     Musculoskeletal:      Cervical back: Normal range of motion and neck supple. No muscular tenderness.      Right lower leg: Edema (trace sockline B) present.      Left lower leg: Edema (trace sockline B) present.   Neurological:      Mental Status: He is alert and oriented to person, place, and time.      Cranial Nerves: No cranial nerve deficit.      Gait: Gait normal.   Psychiatric:         Attention and Perception: Attention normal.         Mood and Affect: " Mood and affect normal.         Behavior: Behavior normal.         Thought Content: Thought content normal.             Assessment/ Plan  Diagnoses and all orders for this visit:    Essential hypertension  -     amLODIPine (NORVASC) 5 MG tablet; Take 2 tablets by mouth Daily.  -     Microalbumin / Creatinine Urine Ratio - Urine, Clean Catch  -     Urinalysis With Culture If Indicated -  -     Eosinophil Smear - Urine, Urine, Clean Catch  -     Duplex Renal Artery - Bilateral Complete CAR; Future    Uncontrolled hypertension  -     Duplex Renal Artery - Bilateral Complete CAR; Future    Acute kidney injury (HCC)  -     Microalbumin / Creatinine Urine Ratio - Urine, Clean Catch  -     Urinalysis With Culture If Indicated -  -     Eosinophil Smear - Urine, Urine, Clean Catch  -     Basic Metabolic Panel  -     Urea Nitrogen, Urine - Urine, Clean Catch  -     Duplex Renal Artery - Bilateral Complete CAR; Future    Lymphocytosis        Return for Next scheduled follow up.      Discussion:  Dileep Hamilton is a 77 y.o. male RTC In short interval f/u:  1. Severe L lateral hip pain, Mild arthritic changes of both hips without hip fracture and Chronic bilateral hamstring origin tendinopathy; issues > 1 year now.   S/P eval with sports med, ortho and pain mgmnt:  Left L5-S1 Transforaminal epidural steroid injection (termed L4-5 on MRI with a sacralized L5, however L5-S1 on x-ray counting from 12th rib)  Left greater trochanteric bursa injection-4/5/22, 2/3/22, 11/28/21, 11/10/21 Dr. Powell temporary relief  Left intra-articular hip injection 6/17/22 -temporary relief  PRP injection in the left hip 8/11/22  Failed tx as noted and remains with severe, limiting pain.  Appt with Dr. Yañez in Ortho upcoming, tertiary opinion.   2. HTN - was uncontrolled despite taylor losartan to high dose longer acting olmesartan and addition of amlodipine 5mg daily at recent visits. Cr went from 1.31 --> 1.44 with change, expected with baseline  ~1-1.3.  Added chlorthalidone last visit for persistent uncontrolled pressure. Cr progressed to 1.83.  D/C chlothalidone and increase amlodipine to 10mg daily.Track home log (noting cuff overreading a bit today). Start compression socks to address potential leg edema side effect of high dose CCB.  Will call for home log in 2 weeks.   3.  DIEGO - Ddx broad here: Overdiurese with diuretic addition vs. AIN with chlorthalidone vs. SANDRA with some rise in Cr after ARB addition vs. Less likely secondary renal process.  Check U/A, FeUrea, Urine eos, Umicroalb/ Cr today. Renal Duplex to assess vascular flow. B/P med mgmnt per #2. F/U after labs.   4. Lymphocytosis - noted on CBC, progressive with abnormal manual smear.Heme appt upcoming. Await note.     RTC TBA after eval in #2 & #3.           Answers for HPI/ROS submitted by the patient on 2/24/2023  What is the primary reason for your visit?: High Blood Pressure  Chronicity: recurrent  Onset: more than 1 year ago  Progression since onset: unchanged  Condition status: resistant  anxiety: No  Agents associated with hypertension: no associated agents  CAD risks: family history  Compliance problems: exercise

## 2023-03-04 LAB
ALBUMIN/CREAT UR: 27 MG/G CREAT (ref 0–29)
APPEARANCE UR: CLEAR
BACTERIA #/AREA URNS HPF: NORMAL /HPF
BILIRUB UR QL STRIP: NEGATIVE
CASTS URNS QL MICRO: NORMAL /LPF
COLOR UR: YELLOW
CREAT UR-MCNC: 90.9 MG/DL
EPI CELLS #/AREA URNS HPF: NORMAL /HPF (ref 0–10)
GLUCOSE UR QL STRIP: NEGATIVE
HGB UR QL STRIP: NEGATIVE
KETONES UR QL STRIP: NEGATIVE
LEUKOCYTE ESTERASE UR QL STRIP: NEGATIVE
MICRO URNS: NORMAL
MICRO URNS: NORMAL
MICROALBUMIN UR-MCNC: 24.2 UG/ML
NITRITE UR QL STRIP: NEGATIVE
PH UR STRIP: 6 [PH] (ref 5–7.5)
PROT UR QL STRIP: NEGATIVE
RBC #/AREA URNS HPF: NORMAL /HPF (ref 0–2)
SP GR UR STRIP: 1.02 (ref 1–1.03)
URINALYSIS REFLEX: NORMAL
UROBILINOGEN UR STRIP-MCNC: 0.2 MG/DL (ref 0.2–1)
UUN 24H UR-MCNC: 965 MG/DL
WBC #/AREA URNS HPF: NORMAL /HPF (ref 0–5)

## 2023-03-22 ENCOUNTER — HOSPITAL ENCOUNTER (OUTPATIENT)
Dept: CARDIOLOGY | Facility: HOSPITAL | Age: 78
Discharge: HOME | End: 2023-03-22
Admitting: INTERNAL MEDICINE
Payer: MEDICARE

## 2023-03-22 ENCOUNTER — CONSULT (OUTPATIENT)
Dept: ONCOLOGY | Facility: CLINIC | Age: 78
End: 2023-03-22
Payer: MEDICARE

## 2023-03-22 ENCOUNTER — APPOINTMENT (OUTPATIENT)
Dept: OTHER | Facility: HOSPITAL | Age: 78
End: 2023-03-22
Payer: MEDICARE

## 2023-03-22 VITALS
BODY MASS INDEX: 38.44 KG/M2 | TEMPERATURE: 97.1 F | SYSTOLIC BLOOD PRESSURE: 146 MMHG | HEART RATE: 54 BPM | RESPIRATION RATE: 18 BRPM | WEIGHT: 283.8 LBS | OXYGEN SATURATION: 99 % | HEIGHT: 72 IN | DIASTOLIC BLOOD PRESSURE: 69 MMHG

## 2023-03-22 DIAGNOSIS — Z85.038 HISTORY OF COLON CANCER: ICD-10-CM

## 2023-03-22 DIAGNOSIS — I10 ESSENTIAL HYPERTENSION: ICD-10-CM

## 2023-03-22 DIAGNOSIS — N17.9 ACUTE KIDNEY INJURY: ICD-10-CM

## 2023-03-22 DIAGNOSIS — I10 UNCONTROLLED HYPERTENSION: ICD-10-CM

## 2023-03-22 DIAGNOSIS — D72.820 LYMPHOCYTOSIS: Primary | ICD-10-CM

## 2023-03-22 DIAGNOSIS — D64.9 NORMOCHROMIC NORMOCYTIC ANEMIA: ICD-10-CM

## 2023-03-22 LAB
BASOPHILS # BLD AUTO: 0.03 10*3/MM3 (ref 0–0.2)
BASOPHILS NFR BLD AUTO: 0.3 % (ref 0–1.5)
BH CV ECHO MEAS - DIST REN A EDV LEFT: 15.9 CM/S
BH CV ECHO MEAS - DIST REN A PSV LEFT: 112 CM/S
BH CV ECHO MEAS - MID REN A EDV LEFT: 7.5 CM/S
BH CV ECHO MEAS - MID REN A PSV LEFT: 111 CM/S
BH CV ECHO MEAS - PROX REN A EDV LEFT: 4.64 CM/S
BH CV ECHO MEAS - PROX REN A PSV LEFT: 99.2 CM/S
BH CV VAS BP LEFT ARM: NORMAL MMHG
BH CV VAS BP RIGHT ARM: NORMAL MMHG
BH CV VAS RENAL AORTIC MID EDV: 5.46 CM/S
BH CV VAS RENAL AORTIC MID PSV: 88 CM/S
BH CV VAS RENAL HILUM LEFT EDV: 4.43 CM/S
BH CV VAS RENAL HILUM LEFT PSV: 51.8 CM/S
BH CV VAS RENAL HILUM RIGHT EDV: 3.41 CM/S
BH CV VAS RENAL HILUM RIGHT PSV: 35.1 CM/S
BH CV XLRA MEAS - KID L LEFT: 10.6 CM
BH CV XLRA MEAS DIST REN A EDV RIGHT: 21.5 CM/S
BH CV XLRA MEAS DIST REN A PSV RIGHT: 93.8 CM/S
BH CV XLRA MEAS KID L RIGHT: 10.5 CM
BH CV XLRA MEAS KID W RIGHT: 4.8 CM
BH CV XLRA MEAS MID REN A EDV RIGHT: 14.6 CM/S
BH CV XLRA MEAS MID REN A PSV RIGHT: 162 CM/S
BH CV XLRA MEAS PROX REN A EDV RIGHT: 14.9 CM/S
BH CV XLRA MEAS PROX REN A PSV RIGHT: 129 CM/S
BH CV XLRA MEAS RAR LEFT: 1.27
BH CV XLRA MEAS RAR RIGHT: 1.84
BH CV XLRA MEAS RENAL A ORG EDV LEFT: -15.5 CM/S
BH CV XLRA MEAS RENAL A ORG EDV RIGHT: 8.73 CM/S
BH CV XLRA MEAS RENAL A ORG PSV LEFT: 105 CM/S
BH CV XLRA MEAS RENAL A ORG PSV RIGHT: 81 CM/S
DEPRECATED RDW RBC AUTO: 44.4 FL (ref 37–54)
EOSINOPHIL # BLD AUTO: 0.18 10*3/MM3 (ref 0–0.4)
EOSINOPHIL NFR BLD AUTO: 1.6 % (ref 0.3–6.2)
ERYTHROCYTE [DISTWIDTH] IN BLOOD BY AUTOMATED COUNT: 13 % (ref 12.3–15.4)
FERRITIN SERPL-MCNC: 167.8 NG/ML (ref 30–400)
HCT VFR BLD AUTO: 40.3 % (ref 37.5–51)
HGB BLD-MCNC: 13.4 G/DL (ref 13–17.7)
IMM GRANULOCYTES # BLD AUTO: 0.03 10*3/MM3 (ref 0–0.05)
IMM GRANULOCYTES NFR BLD AUTO: 0.3 % (ref 0–0.5)
IRON 24H UR-MRATE: 106 MCG/DL (ref 59–158)
IRON SATN MFR SERPL: 27 % (ref 20–50)
LDH SERPL-CCNC: 208 U/L (ref 135–225)
LEFT KIDNEY WIDTH: 5.8 CM
LEFT RENAL UPPER PARENCHYMA MAX: 19.1 CM/S
LEFT RENAL UPPER PARENCHYMA MIN: 2.94 CM/S
LEFT RENAL UPPER PARENCHYMA RI: 0.85
LYMPHOCYTES # BLD AUTO: 3.78 10*3/MM3 (ref 0.7–3.1)
LYMPHOCYTES NFR BLD AUTO: 34.6 % (ref 19.6–45.3)
MAXIMAL PREDICTED HEART RATE: 145 BPM
MCH RBC QN AUTO: 30.9 PG (ref 26.6–33)
MCHC RBC AUTO-ENTMCNC: 33.3 G/DL (ref 31.5–35.7)
MCV RBC AUTO: 92.9 FL (ref 79–97)
MONOCYTES # BLD AUTO: 0.61 10*3/MM3 (ref 0.1–0.9)
MONOCYTES NFR BLD AUTO: 5.6 % (ref 5–12)
NEUTROPHILS NFR BLD AUTO: 57.6 % (ref 42.7–76)
NEUTROPHILS NFR BLD AUTO: 6.31 10*3/MM3 (ref 1.7–7)
NRBC BLD AUTO-RTO: 0 /100 WBC (ref 0–0.2)
PLATELET # BLD AUTO: 321 10*3/MM3 (ref 140–450)
PMV BLD AUTO: 9.7 FL (ref 6–12)
RBC # BLD AUTO: 4.34 10*6/MM3 (ref 4.14–5.8)
RIGHT RENAL UPPER PARENCHYMA MAX: 23.8 CM/S
STRESS TARGET HR: 123 BPM
TIBC SERPL-MCNC: 393 MCG/DL (ref 298–536)
TRANSFERRIN SERPL-MCNC: 264 MG/DL (ref 200–360)
WBC NRBC COR # BLD: 10.94 10*3/MM3 (ref 3.4–10.8)

## 2023-03-22 PROCEDURE — 3077F SYST BP >= 140 MM HG: CPT | Performed by: INTERNAL MEDICINE

## 2023-03-22 PROCEDURE — 3078F DIAST BP <80 MM HG: CPT | Performed by: INTERNAL MEDICINE

## 2023-03-22 PROCEDURE — 85025 COMPLETE CBC W/AUTO DIFF WBC: CPT | Performed by: INTERNAL MEDICINE

## 2023-03-22 PROCEDURE — 88185 FLOWCYTOMETRY/TC ADD-ON: CPT | Performed by: INTERNAL MEDICINE

## 2023-03-22 PROCEDURE — 88184 FLOWCYTOMETRY/ TC 1 MARKER: CPT | Performed by: INTERNAL MEDICINE

## 2023-03-22 PROCEDURE — 83540 ASSAY OF IRON: CPT | Performed by: INTERNAL MEDICINE

## 2023-03-22 PROCEDURE — 82728 ASSAY OF FERRITIN: CPT | Performed by: INTERNAL MEDICINE

## 2023-03-22 PROCEDURE — 88182 CELL MARKER STUDY: CPT | Performed by: INTERNAL MEDICINE

## 2023-03-22 PROCEDURE — 36415 COLL VENOUS BLD VENIPUNCTURE: CPT | Performed by: INTERNAL MEDICINE

## 2023-03-22 PROCEDURE — 84466 ASSAY OF TRANSFERRIN: CPT | Performed by: INTERNAL MEDICINE

## 2023-03-22 PROCEDURE — 99204 OFFICE O/P NEW MOD 45 MIN: CPT | Performed by: INTERNAL MEDICINE

## 2023-03-22 PROCEDURE — 93975 VASCULAR STUDY: CPT

## 2023-03-22 PROCEDURE — 1126F AMNT PAIN NOTED NONE PRSNT: CPT | Performed by: INTERNAL MEDICINE

## 2023-03-22 PROCEDURE — 83615 LACTATE (LD) (LDH) ENZYME: CPT | Performed by: INTERNAL MEDICINE

## 2023-03-22 NOTE — PROGRESS NOTES
Subjective     REASON FOR CONSULTATION:  Provide an opinion on any further workup or treatment on:    Lymphocytosis                       REQUESTING PHYSICIAN: Juan A Rubi MD    RECORDS OBTAINED: Records of the patients history including those obtained from the referring provider were reviewed and summarized in detail.    HISTORY OF PRESENT ILLNESS:    Dileep Hamilton is a 77 y.o. patient who was referred for evaluation of lymphocytosis.  He has history of colon cancer in 2004  Status post resection.  It was stage I disease and he did not require additional therapy.  He was noted to have an increase in his WBC and lymphocyte counts.  He did not have any recent infection.  However, he has been having problem with bursitis of the left hip area.  He has scoliosis but he is not having problem with sciatica.  He was seen by orthopedics at [facility] and he was referred to a second orthopedic surgeon at [facility] for further evaluation of the bursitis.      Patient reports that he received steroid injections in the past.  He received Solu-Medrol on 11/23/2021, 2/2/2022 and 4/5/2022.  He also had platelet rich plasma treatment in June/July 2022.       REVIEW OF SYSTEMS:  Review of Systems   Constitutional: Negative for fatigue, fever and unexpected weight change.   HENT: Positive for hearing loss and tinnitus. Negative for nosebleeds and voice change.    Eyes: Negative for visual disturbance.   Respiratory: Negative for cough and shortness of breath.    Cardiovascular: Negative for chest pain and leg swelling.   Gastrointestinal: Negative for abdominal pain, blood in stool, constipation, diarrhea, nausea and vomiting.   Genitourinary: Negative for frequency and hematuria.   Musculoskeletal: Positive for arthralgias and back pain. Negative for joint swelling.        Left hip bursitis   Skin: Negative for rash.   Neurological: Negative for dizziness and headaches.   Hematological: Negative for adenopathy. Does not bruise/bleed  easily.   Psychiatric/Behavioral: Negative for dysphoric mood. The patient is not nervous/anxious.       Past Medical History:   Diagnosis Date   • Arthritis Hips   • Benign prostatic hyperplasia 2   • Bursitis of hip 09/2002   • Cataract Right eye 2020   • Colon cancer (HCC)     T1N0 stage I, s/p partial R colon resection 2014   • Family history of blood clots    • Heart murmur 2016   • Hernia Abdomen   • Hip arthrosis 09/2002   • History of cataract     B early 2014   • History of shingles     2013   • Hyperlipidemia    • Hypertension     dx'd 8/2014   • IFG (impaired fasting glucose)    • Leukocytosis    • Lumbar radiculitis 12/27/2022   • Lung mass 11/12/2015    chronic per pt, distant imaging    • Obesity    • Personal history of scoliosis     mild   • Proteinuria    • Rotator cuff syndrome    • Scoliosis Lower back   • Shingles outbreak 10/15/2017   • Squamous cell carcinoma     skin   • Stasis dermatitis    • Visual impairment Cataracts   • Vitamin D deficiency      Past Surgical History:   Procedure Laterality Date   • CATARACT EXTRACTION Right 05/2020   • CATARACT EXTRACTION Left 11/02/2022   • COLON SURGERY  12/05/2013   • COLONOSCOPY  2018   • EPIDURAL Left 12/29/2022    Procedure: LUMBAR/SACRAL TRANSFORAMINAL EPIDURAL Left L4-5;  Surgeon: Tori Solitario MD;  Location: [facility] MAIN OR;  Service: Pain Management;  Laterality: Left;   • SUBTOTAL COLECTOMY  12/05/2014    colon ca 2014; R side of colon only   • VASECTOMY      1978     Social History     Socioeconomic History   • Marital status:      Spouse name: Tana   • Number of children: 1   Tobacco Use   • Smoking status: Never   • Smokeless tobacco: Never   Vaping Use   • Vaping Use: Never used   Substance and Sexual Activity   • Alcohol use: No   • Drug use: No   • Sexual activity: Yes     Partners: Female     Birth control/protection: None, Vasectomy     Comment: wife only; no hx STD's     Family History   Problem Relation Age of Onset   • Heart  "disease Mother    • Diabetes Mother    • Hypertension Mother    • Lung cancer Mother    • COPD Mother         `   • Asthma Mother    • Anesthesia problems Mother    • Arthritis Mother    • Stroke Father    • Diabetes Father    • Heart disease Father    • Diabetes type II Sister    • Diabetes type II Brother    • No Known Problems Son    • Diabetes Maternal Grandmother    • Clotting disorder Other         Family history of blood clots      MEDICATIONS:    Current Outpatient Medications:   •  amLODIPine (NORVASC) 5 MG tablet, Take 2 tablets by mouth Daily., Disp: 90 tablet, Rfl: 1  •  Cholecalciferol (VITAMIN D3) 20 MCG (800 UNIT) tablet, Take 800 Int'l Units by mouth Daily., Disp: 75 tablet, Rfl:   •  Coenzyme Q10 (COQ-10) 100 MG capsule, Take  by mouth., Disp: , Rfl:   •  Misc Natural Products (Osteo Bi-Flex Adv Triple St) tablet, , Disp: , Rfl:   •  Multiple Vitamin (MULTI VITAMIN DAILY PO), Take  by mouth., Disp: , Rfl:   •  olmesartan (BENICAR) 40 MG tablet, Take 1 tablet by mouth Daily., Disp: 90 tablet, Rfl: 1  •  Omega-3 Fatty Acids (FISH OIL) 1000 MG capsule capsule, Take  by mouth., Disp: , Rfl:      ALLERGIES:  Allergies   Allergen Reactions   • Statins         Objective   VITAL SIGNS:  Vitals:    03/22/23 0953   BP: 146/69   Pulse: 54   Resp: 18   Temp: 97.1 °F (36.2 °C)   TempSrc: Temporal   SpO2: 99%   Weight: 129 kg (283 lb 12.8 oz)   Height: 182.9 cm (72.01\")   PainSc: 0-No pain     Wt Readings from Last 3 Encounters:   03/22/23 129 kg (283 lb 12.8 oz)   03/03/23 127 kg (279 lb 11.2 oz)   02/02/23 129 kg (284 lb 8 oz)       PHYSICAL EXAMINATION  GENERAL:  The patient appears in good general condition, not in acute distress.  SKIN: No skin rashes. No ecchymosis.  HEAD:  Normocephalic.  EYES:  No Jaundice. No Pallor. Pupils equal. EOMI.  NECK:  Supple with Good ROM. No Thyromegaly. No Masses.  LYMPHATICS:  No cervical, supraclavicular or axillary lymphadenopathy.  CHEST: Normal respiratory effort. Lungs " clear to auscultation.   CARDIAC:  Normal S1 & S2.  Grade 2 systolic murmur.   ABDOMEN:  Soft. No tenderness. No Hepatomegaly. No Splenomegaly. No masses.  EXTREMITIES: Osteoarthritic changes in the hands bilaterally. No noted deformity.   NEUROLOGICAL:  No Focal neurological deficits.     RESULT REVIEW:   Results from last 7 days   Lab Units 03/22/23  0924   WBC 10*3/mm3 10.94*   NEUTROS ABS 10*3/mm3 6.31   HEMOGLOBIN g/dL 13.4   HEMATOCRIT % 40.3   PLATELETS 10*3/mm3 321     Component      Latest Ref Rng & Units 8/19/2020 1/4/2023 1/27/2023 3/22/2023   Neutrophils Absolute      1.70 - 7.00 10*3/mm3 5.07 5.29 5.22 6.31   Lymphocytes Absolute      0.70 - 3.10 10*3/mm3 3.46 (H) 4.18 (H) 4.54 (H) 3.78 (H)   Monocytes Absolute      0.10 - 0.90 10*3/mm3 0.50 0.54 0.66 0.61   Eosinophils Absolute      0.00 - 0.40 10*3/mm3 0.13 0.19 0.19 0.18   Basophils Absolute      0.00 - 0.20 10*3/mm3 0.03 0.03 0.04 0.03   Immature Grans, Absolute      0.00 - 0.05 10*3/mm3 0.02 0.03 0.02 0.03         Lab 03/22/23  0924   IRON 106   IRON SATURATION 27   TIBC 393   TRANSFERRIN 264   FERRITIN 167.80       Lab Results   Component Value Date    CEA 1.20 12/03/2019    CEA 0.95 11/30/2018     Component      Latest Ref Rng & Units 3/22/2023   LDH      135 - 225 U/L 208     Assessment & Plan   *Lymphocytosis.  · It dates back to 2015 when lymphocyte count was 4600 on 10/22/2015.  · Lymphocyte count was 4540 on 1/27/2023.  · Lymphocyte count is 3780 today.  · He has no lymphadenopathy or splenomegaly on exam.  · The lymphocytosis may represent a reactive process secondary to the ongoing inflammation-arthritis.  · With the persistent lymphocytosis, recommended obtaining peripheral blood flow cytometry to evaluate for underlying lymphoproliferative disorder like chronic lymphocytic leukemia.    *Normochromic normocytic anemia.  · Hemoglobin was 13.4 on 12/3/2019  · Hemoglobin is 13.4 today.  · Iron stores are normal today.    *History of colon  cancer.  · Patient underwent partial colon resection in December 2014.  · Tumor was T1 a N0.  · He did not require additional therapy after surgery.    PLAN:    1.  Obtain peripheral blood flow cytometry.  2.  We will see him in follow-up in 1 month to review results.  CBC will be repeated.  3.  Obtain vitamin B12 and folate levels at his return visit.      Ruth Salter MD  03/22/23

## 2023-03-23 DIAGNOSIS — I10 UNCONTROLLED HYPERTENSION: ICD-10-CM

## 2023-03-23 DIAGNOSIS — I10 ESSENTIAL HYPERTENSION: ICD-10-CM

## 2023-03-23 RX ORDER — OLMESARTAN MEDOXOMIL 40 MG/1
40 TABLET ORAL DAILY
Qty: 90 TABLET | Refills: 1 | Status: SHIPPED | OUTPATIENT
Start: 2023-03-23

## 2023-03-23 RX ORDER — AMLODIPINE BESYLATE 5 MG/1
10 TABLET ORAL DAILY
Qty: 60 TABLET | Refills: 0 | Status: SHIPPED | OUTPATIENT
Start: 2023-03-23

## 2023-03-24 LAB — REF LAB TEST METHOD: NORMAL

## 2023-04-14 ENCOUNTER — OFFICE VISIT (OUTPATIENT)
Dept: INTERNAL MEDICINE | Facility: CLINIC | Age: 78
End: 2023-04-14
Payer: MEDICARE

## 2023-04-14 ENCOUNTER — LAB (OUTPATIENT)
Dept: LAB | Facility: HOSPITAL | Age: 78
End: 2023-04-14
Payer: MEDICARE

## 2023-04-14 VITALS
OXYGEN SATURATION: 98 % | BODY MASS INDEX: 38.43 KG/M2 | DIASTOLIC BLOOD PRESSURE: 60 MMHG | HEART RATE: 53 BPM | SYSTOLIC BLOOD PRESSURE: 134 MMHG | TEMPERATURE: 97.6 F | WEIGHT: 283.4 LBS

## 2023-04-14 DIAGNOSIS — M70.62 TROCHANTERIC BURSITIS OF LEFT HIP: ICD-10-CM

## 2023-04-14 DIAGNOSIS — I10 ESSENTIAL HYPERTENSION, BENIGN: Primary | ICD-10-CM

## 2023-04-14 DIAGNOSIS — I10 ESSENTIAL HYPERTENSION: ICD-10-CM

## 2023-04-14 DIAGNOSIS — N17.9 AKI (ACUTE KIDNEY INJURY): ICD-10-CM

## 2023-04-14 DIAGNOSIS — I10 UNCONTROLLED HYPERTENSION: Primary | ICD-10-CM

## 2023-04-14 DIAGNOSIS — M16.0 PRIMARY OSTEOARTHRITIS OF BOTH HIPS: ICD-10-CM

## 2023-04-14 LAB
ANION GAP SERPL CALCULATED.3IONS-SCNC: 10.8 MMOL/L (ref 5–15)
BUN SERPL-MCNC: 31 MG/DL (ref 8–23)
BUN/CREAT SERPL: 20.9 (ref 7–25)
CALCIUM SPEC-SCNC: 9.8 MG/DL (ref 8.6–10.5)
CHLORIDE SERPL-SCNC: 106 MMOL/L (ref 98–107)
CO2 SERPL-SCNC: 22.2 MMOL/L (ref 22–29)
CREAT SERPL-MCNC: 1.48 MG/DL (ref 0.76–1.27)
EGFRCR SERPLBLD CKD-EPI 2021: 49 ML/MIN/1.73
GLUCOSE SERPL-MCNC: 116 MG/DL (ref 65–99)
HOLD SPECIMEN: NORMAL
POTASSIUM SERPL-SCNC: 4.9 MMOL/L (ref 3.5–5.2)
SODIUM SERPL-SCNC: 139 MMOL/L (ref 136–145)
WHOLE BLOOD HOLD SPECIMEN: NORMAL

## 2023-04-14 PROCEDURE — 36415 COLL VENOUS BLD VENIPUNCTURE: CPT

## 2023-04-14 PROCEDURE — 80048 BASIC METABOLIC PNL TOTAL CA: CPT | Performed by: INTERNAL MEDICINE

## 2023-04-14 RX ORDER — CHLORTHALIDONE 25 MG/1
TABLET ORAL
COMMUNITY
Start: 2023-02-02 | End: 2023-04-14

## 2023-04-14 NOTE — PROGRESS NOTES
No chief complaint on file.      HPI  Dileep Hamilton is a 77 y.o. male RTC in f/u:   1. Severe L lateral hip pain, Mild arthritic changes of both hips without hip fracture and Chronic bilateral hamstring origin tendinopathy; issues > 1 year now - LEFT HIP OPEN TROCHANTERIC BURSECTOMY, ILIOTIBIAL BAND TENOTOMY, POSSIBLE GLUTEUS REPAIR, AND ASSOCIATED PROCEDURES planned next week with Dr. Yañez in 2. HTN - was uncontrolled despite change losartan to high dose longer acting olmesartan and addition of amlodipine 5mg daily at recent visits.  Had to increase amlodipine to 10mg but 'feet swelling like sausages'.  OK in AM but swell over day. Compression is helping. Has not had labs prior to appt today, noting DIEGO after had diuretic addition. Wife checking pressure at home on manual cuff, log with him.      Review of Systems   Constitutional: Negative for malaise/fatigue and weight gain.   Eyes: Negative for blurred vision.   Cardiovascular: Positive for leg swelling. Negative for chest pain, dyspnea on exertion, orthopnea, palpitations and paroxysmal nocturnal dyspnea.   Respiratory: Negative for shortness of breath.    Musculoskeletal: Positive for joint pain. Negative for neck pain.   Neurological: Negative for dizziness, headaches and light-headedness.       The following portions of the patient's history were reviewed and updated as appropriate: allergies, current medications, past medical history, past social history and problem list.      Current Outpatient Medications:   •  amLODIPine (NORVASC) 5 MG tablet, Take 2 tablets by mouth Daily., Disp: 60 tablet, Rfl: 0  •  Cholecalciferol (VITAMIN D3) 20 MCG (800 UNIT) tablet, Take 800 Int'l Units by mouth Daily., Disp: 75 tablet, Rfl:   •  Coenzyme Q10 (COQ-10) 100 MG capsule, Take  by mouth., Disp: , Rfl:   •  Misc Natural Products (Osteo Bi-Flex Adv Triple St) tablet, , Disp: , Rfl:   •  Multiple Vitamin (MULTI VITAMIN DAILY PO), Take  by mouth., Disp: , Rfl:   •   olmesartan (BENICAR) 40 MG tablet, Take 1 tablet by mouth Daily., Disp: 90 tablet, Rfl: 1  •  Omega-3 Fatty Acids (FISH OIL) 1000 MG capsule capsule, Take  by mouth., Disp: , Rfl:     Vitals:    04/14/23 1548 04/14/23 1557   BP: 128/62 134/60   BP Location: Left arm Left arm   Patient Position: Sitting Sitting   Cuff Size: Adult Adult   Pulse: 53    Temp: 97.6 °F (36.4 °C)    TempSrc: Oral    SpO2: 98%    Weight: 129 kg (283 lb 6.4 oz)      Body mass index is 38.43 kg/m².        Physical Exam  Vitals reviewed.   Constitutional:       General: He is not in acute distress.     Appearance: He is well-developed. He is not ill-appearing or toxic-appearing.   HENT:      Head: Normocephalic.   Cardiovascular:      Rate and Rhythm: Normal rate and regular rhythm.      Pulses:           Carotid pulses are 2+ on the right side and 2+ on the left side.     Heart sounds: Murmur (RUSB) heard.    Systolic murmur is present.  Pulmonary:      Effort: Pulmonary effort is normal. No respiratory distress.   Musculoskeletal:      Right lower leg: Edema (compression socks on) present.      Left lower leg: Edema (compression socks on) present.   Neurological:      Mental Status: He is alert and oriented to person, place, and time.      Cranial Nerves: No dysarthria or facial asymmetry.      Gait: Gait normal.   Psychiatric:         Mood and Affect: Mood normal.         Behavior: Behavior normal.         Thought Content: Thought content normal.             ECG 12 Lead    Date/Time: 4/14/2023 5:42 PM  Performed by: Juan A Rubi MD  Authorized by: Juan A Rubi MD   Comparison: compared with previous ECG from 10/18/2018  Rhythm: sinus rhythm and sinus bradycardia  Ectopy: atrial premature contractions  Rate: bradycardic  BPM: 51  Conduction: conduction normal  ST Segments: ST segments normal  T Waves: T waves normal  QRS axis: normal    Clinical impression: non-specific ECG  Comments: QTc - 422  Indication - pre-op, uncontrolled  HTN.          Assessment/ Plan  Diagnoses and all orders for this visit:    Essential hypertension  -     Basic Metabolic Panel    DIEGO (acute kidney injury)  -     Basic Metabolic Panel    Trochanteric bursitis of left hip    Primary osteoarthritis of both hips    Other orders  -     Discontinue: chlorthalidone (HYGROTON) 25 MG tablet  -     ECG 12 Lead        No follow-ups on file.      Discussion:  Dileep Hamilton is a 77 y.o. male RTC in f/u:   1. Severe L lateral hip pain, Mild arthritic changes of both hips without hip fracture and Chronic bilateral hamstring origin tendinopathy; issues > 1 year now - planning LEFT HIP OPEN TROCHANTERIC BURSECTOMY, ILIOTIBIAL BAND TENOTOMY, POSSIBLE GLUTEUS REPAIR, AND ASSOCIATED PROCEDURES planned next week with Dr. Yñaez.  EKG OK today, unchanged from prior.   2. HTN, uncontrolled - was uncontrolled despite change losartan to high dose longer acting olmesartan and addition of amlodipine 5mg daily at recent visits.  Cr went from 1.31 --> 1.44 with change, expected with baseline ~1-1.3.  Added chlorthalidone for persistent uncontrolled pressure and Cr progressed to 1.83.  D/C'd chlothalidone and increased amlodipine to 10mg daily. Renal duplex with normal RA B. Etiology of pressure progression pain with #1 vs. Decline in conditioning with #1 vs. undx'd AIDEE vs. Age related progression vs. Alternate secondary cause +/- CKD progression.    Pressure with borderline control only today, but d/w pt would avoid new med changes noted surgery next week.  C/W same meds for now and will f/u with pt after surgery to see how pressure trending on home log. Consider adding loop diuretic cautiously if pressure remains elevated on home log given CKD element.    Check BMP today.   Advised to HOLD olmesartan day of surgery to avoid post op hypotension issues.     RTC TBA after surgery .       Answers for HPI/ROS submitted by the patient on 4/7/2023  What is the primary reason for your visit?: High  Blood Pressure  Chronicity: recurrent  Onset: more than 1 year ago  Progression since onset: unchanged  Condition status: resistant  anxiety: No  peripheral edema: Yes  sweats: No  CAD risks: family history  Compliance problems: exercise

## 2023-04-18 ENCOUNTER — TELEPHONE (OUTPATIENT)
Dept: INTERNAL MEDICINE | Facility: CLINIC | Age: 78
End: 2023-04-18

## 2023-04-18 NOTE — TELEPHONE ENCOUNTER
Caller: Dileep Hamilton    Relationship: Self    Best call back number: [phone]     What is the best time to reach you: ANYTIME     Who are you requesting to speak with (clinical staff, provider,  specific staff member): DR. VILLAVICENCIO     What was the call regarding: PATIENT STATES HE WAS ONLY GIVEN 10 TABLETS OF amLODIPine (NORVASC) 5 MG tablet FROM THE PHARMACY AND WAS TOLD TO TAKE 1 A DAY. PATIENT STATES HE IS NOT SURE IF HE SHOULD BE TAKING 1 A DAY OR 2 A DAY AS HE SAID DR. VILLAVICENCIO TOLD HIM AT HIS LAST APPOINTMENT TO START DOUBLING UP ON THE MEDICATION.     PLEASE ADVISE.     Do you require a callback: YES

## 2023-04-19 DIAGNOSIS — I10 UNCONTROLLED HYPERTENSION: ICD-10-CM

## 2023-04-19 DIAGNOSIS — I10 ESSENTIAL HYPERTENSION: ICD-10-CM

## 2023-04-19 NOTE — TELEPHONE ENCOUNTER
Pt returning [facility] call and would like a call back at earliest convenience.    Best call back number [phone]

## 2023-04-27 ENCOUNTER — TELEPHONE (OUTPATIENT)
Dept: INTERNAL MEDICINE | Facility: CLINIC | Age: 78
End: 2023-04-27

## 2023-04-27 DIAGNOSIS — I10 UNCONTROLLED HYPERTENSION: ICD-10-CM

## 2023-04-27 DIAGNOSIS — I10 ESSENTIAL HYPERTENSION: ICD-10-CM

## 2023-04-27 RX ORDER — AMLODIPINE BESYLATE 5 MG/1
10 TABLET ORAL DAILY
Qty: 180 TABLET | Refills: 1 | Status: SHIPPED | OUTPATIENT
Start: 2023-04-27

## 2023-04-27 NOTE — TELEPHONE ENCOUNTER
Caller: Dileep Hamilton    Relationship: Self    Best call back number: [phone]  Requested Prescriptions:   Requested Prescriptions     Pending Prescriptions Disp Refills   • amLODIPine (NORVASC) 5 MG tablet 60 tablet 0     Sig: Take 2 tablets by mouth Daily.        Pharmacy where request should be sent: [address] ([facility] [place] - [address] [facility] [phone] [facility] [phone]      Last office visit with prescribing clinician: 4/14/2023   Last telemedicine visit with prescribing clinician: Visit date not found   Next office visit with prescribing clinician: 1/11/2024       Does the patient have less than a 3 day supply:  [x] Yes  [] No    Would you like a call back once the refill request has been completed: [] Yes [x] No    If the office needs to give you a call back, can they leave a voicemail: [] Yes [x] No    Reny Felder Rep   04/27/23 08:14 EDT

## 2023-04-27 NOTE — TELEPHONE ENCOUNTER
Caller: Dileep Hamilton    Relationship: Self    Best call back number: [phone]    What medication are you requesting: LASIX    What are your current symptoms: SWOLLEN FEET AND CALFS CAN NO GET COMPRESSION SOCKS ON OR SHOES     How long have you been experiencing symptoms:   Have you had these symptoms before:    [x] Yes  [] No    Have you been treated for these symptoms before:   [x] Yes  [] No    If a prescription is needed, what is your preferred pharmacy and phone number: [address] ([facility]), [place] - [address] [facility] [phone] [facility] [phone] [facility]     Additional notes:

## 2023-04-27 NOTE — TELEPHONE ENCOUNTER
I suspect this is post op issue from fluids given inpt and element of inactivity after surgery, likely compounded by effects of high dose amloridipine. However, given issues with DIEGO on labs after last diuretic trial, I really need to see a BMP prior to furosemide/ diuretic trial.  Please report to hospital lab or in office lab for BMP, non-fasting OK, to assess renal function at this time.  Giving diuretics if renal fxn is currently compromised could be dangerous.

## 2023-04-27 NOTE — TELEPHONE ENCOUNTER
Called patient and LDM with Message to schedule a lab appt for NF BMP to assess renal function   What Type Of Note Output Would You Prefer (Optional)?: Standard Output How Severe Is Your Skin Lesion?: moderate Has Your Skin Lesion Been Treated?: not been treated Is This A New Presentation, Or A Follow-Up?: Skin Lesion

## 2023-04-28 ENCOUNTER — LAB (OUTPATIENT)
Dept: LAB | Facility: HOSPITAL | Age: 78
End: 2023-04-28
Payer: MEDICARE

## 2023-04-28 DIAGNOSIS — N40.0 BENIGN PROSTATIC HYPERPLASIA WITHOUT LOWER URINARY TRACT SYMPTOMS: ICD-10-CM

## 2023-04-28 DIAGNOSIS — E11.00 TYPE 2 DIABETES MELLITUS WITH HYPEROSMOLARITY WITHOUT COMA, WITHOUT LONG-TERM CURRENT USE OF INSULIN: ICD-10-CM

## 2023-04-28 DIAGNOSIS — E78.2 MIXED HYPERLIPIDEMIA: ICD-10-CM

## 2023-04-28 DIAGNOSIS — E66.01 MORBIDLY OBESE: ICD-10-CM

## 2023-04-28 DIAGNOSIS — I10 ESSENTIAL HYPERTENSION: Primary | ICD-10-CM

## 2023-04-28 DIAGNOSIS — R79.9 ABNORMAL BLOOD FINDINGS: ICD-10-CM

## 2023-04-28 DIAGNOSIS — E55.9 VITAMIN D DEFICIENCY: ICD-10-CM

## 2023-04-28 DIAGNOSIS — I10 ESSENTIAL HYPERTENSION, MALIGNANT: Primary | ICD-10-CM

## 2023-04-28 DIAGNOSIS — E66.1 MORBID DRUG-INDUCED OBESITY: ICD-10-CM

## 2023-04-28 LAB
25(OH)D3 SERPL-MCNC: 30.7 NG/ML (ref 30–100)
ALBUMIN SERPL-MCNC: 3.7 G/DL (ref 3.5–5.2)
ALBUMIN UR-MCNC: 2.7 MG/DL
ALBUMIN/GLOB SERPL: 1.3 G/DL
ALP SERPL-CCNC: 86 U/L (ref 39–117)
ALT SERPL W P-5'-P-CCNC: 25 U/L (ref 1–41)
ANION GAP SERPL CALCULATED.3IONS-SCNC: 11.5 MMOL/L (ref 5–15)
AST SERPL-CCNC: 19 U/L (ref 1–40)
BASOPHILS # BLD AUTO: 0.04 10*3/MM3 (ref 0–0.2)
BASOPHILS NFR BLD AUTO: 0.4 % (ref 0–1.5)
BILIRUB SERPL-MCNC: 0.6 MG/DL (ref 0–1.2)
BILIRUB UR QL STRIP: NEGATIVE
BUN SERPL-MCNC: 24 MG/DL (ref 8–23)
BUN/CREAT SERPL: 16.7 (ref 7–25)
CALCIUM SPEC-SCNC: 9.3 MG/DL (ref 8.6–10.5)
CHLORIDE SERPL-SCNC: 101 MMOL/L (ref 98–107)
CHOLEST SERPL-MCNC: 176 MG/DL (ref 0–200)
CHOLEST SERPL-MCNC: 180 MG/DL (ref 0–200)
CLARITY UR: CLEAR
CO2 SERPL-SCNC: 24.5 MMOL/L (ref 22–29)
COLOR UR: YELLOW
CREAT SERPL-MCNC: 1.44 MG/DL (ref 0.76–1.27)
CREAT UR-MCNC: 85 MG/DL
DEPRECATED RDW RBC AUTO: 44.7 FL (ref 37–54)
EGFRCR SERPLBLD CKD-EPI 2021: 50.7 ML/MIN/1.73
EOSINOPHIL # BLD AUTO: 0.18 10*3/MM3 (ref 0–0.4)
EOSINOPHIL NFR BLD AUTO: 1.8 % (ref 0.3–6.2)
EOSINOPHIL SPEC QL MICRO: 0 % EOS/100 CELLS (ref 0–0)
ERYTHROCYTE [DISTWIDTH] IN BLOOD BY AUTOMATED COUNT: 13.1 % (ref 12.3–15.4)
GLOBULIN UR ELPH-MCNC: 2.9 GM/DL
GLUCOSE SERPL-MCNC: 110 MG/DL (ref 65–99)
GLUCOSE UR STRIP-MCNC: NEGATIVE MG/DL
HBA1C MFR BLD: 6.1 % (ref 4.8–5.6)
HCT VFR BLD AUTO: 37.5 % (ref 37.5–51)
HDLC SERPL-MCNC: 35 MG/DL (ref 40–60)
HDLC SERPL-MCNC: 37 MG/DL (ref 40–60)
HGB BLD-MCNC: 12.5 G/DL (ref 13–17.7)
HGB UR QL STRIP.AUTO: NEGATIVE
IMM GRANULOCYTES # BLD AUTO: 0.03 10*3/MM3 (ref 0–0.05)
IMM GRANULOCYTES NFR BLD AUTO: 0.3 % (ref 0–0.5)
KETONES UR QL STRIP: NEGATIVE
LAB AP CASE REPORT: NORMAL
LDLC SERPL CALC-MCNC: 121 MG/DL (ref 0–100)
LDLC SERPL CALC-MCNC: 123 MG/DL (ref 0–100)
LDLC/HDLC SERPL: 3.26 {RATIO}
LDLC/HDLC SERPL: 3.41 {RATIO}
LEUKOCYTE ESTERASE UR QL STRIP.AUTO: NEGATIVE
LYMPHOCYTES # BLD AUTO: 3.76 10*3/MM3 (ref 0.7–3.1)
LYMPHOCYTES NFR BLD AUTO: 36.9 % (ref 19.6–45.3)
MCH RBC QN AUTO: 30.6 PG (ref 26.6–33)
MCHC RBC AUTO-ENTMCNC: 33.3 G/DL (ref 31.5–35.7)
MCV RBC AUTO: 91.7 FL (ref 79–97)
MICROALBUMIN/CREAT UR: 31.8 MG/G
MONOCYTES # BLD AUTO: 0.62 10*3/MM3 (ref 0.1–0.9)
MONOCYTES NFR BLD AUTO: 6.1 % (ref 5–12)
NEUTROPHILS NFR BLD AUTO: 5.56 10*3/MM3 (ref 1.7–7)
NEUTROPHILS NFR BLD AUTO: 54.5 % (ref 42.7–76)
NITRITE UR QL STRIP: NEGATIVE
NRBC BLD AUTO-RTO: 0 /100 WBC (ref 0–0.2)
PATH REPORT.FINAL DX SPEC: NORMAL
PATH REPORT.GROSS SPEC: NORMAL
PATHOLOGY REVIEW: YES
PH UR STRIP.AUTO: 6.5 [PH] (ref 5–8)
PLATELET # BLD AUTO: 335 10*3/MM3 (ref 140–450)
PMV BLD AUTO: 9.9 FL (ref 6–12)
POTASSIUM SERPL-SCNC: 4.8 MMOL/L (ref 3.5–5.2)
PROT SERPL-MCNC: 6.6 G/DL (ref 6–8.5)
PROT UR QL STRIP: NEGATIVE
PSA SERPL-MCNC: 2.35 NG/ML (ref 0–4)
RBC # BLD AUTO: 4.09 10*6/MM3 (ref 4.14–5.8)
SODIUM SERPL-SCNC: 137 MMOL/L (ref 136–145)
SP GR UR STRIP: 1.01 (ref 1–1.03)
TRIGL SERPL-MCNC: 109 MG/DL (ref 0–150)
TRIGL SERPL-MCNC: 112 MG/DL (ref 0–150)
UROBILINOGEN UR QL STRIP: NORMAL
UUN 24H UR-MCNC: 563 MG/DL
VLDLC SERPL-MCNC: 20 MG/DL (ref 5–40)
VLDLC SERPL-MCNC: 20 MG/DL (ref 5–40)
WBC NRBC COR # BLD: 10.19 10*3/MM3 (ref 3.4–10.8)

## 2023-04-28 PROCEDURE — 82043 UR ALBUMIN QUANTITATIVE: CPT | Performed by: INTERNAL MEDICINE

## 2023-04-28 PROCEDURE — 87205 SMEAR GRAM STAIN: CPT

## 2023-04-28 PROCEDURE — 82306 VITAMIN D 25 HYDROXY: CPT | Performed by: INTERNAL MEDICINE

## 2023-04-28 PROCEDURE — 80061 LIPID PANEL: CPT | Performed by: INTERNAL MEDICINE

## 2023-04-28 PROCEDURE — 82570 ASSAY OF URINE CREATININE: CPT | Performed by: INTERNAL MEDICINE

## 2023-04-28 PROCEDURE — 87086 URINE CULTURE/COLONY COUNT: CPT

## 2023-04-28 PROCEDURE — 80053 COMPREHEN METABOLIC PANEL: CPT | Performed by: INTERNAL MEDICINE

## 2023-04-28 PROCEDURE — 81003 URINALYSIS AUTO W/O SCOPE: CPT

## 2023-04-28 PROCEDURE — 83036 HEMOGLOBIN GLYCOSYLATED A1C: CPT | Performed by: INTERNAL MEDICINE

## 2023-04-28 PROCEDURE — 84153 ASSAY OF PSA TOTAL: CPT | Performed by: INTERNAL MEDICINE

## 2023-04-28 PROCEDURE — 84540 ASSAY OF URINE/UREA-N: CPT | Performed by: INTERNAL MEDICINE

## 2023-04-28 RX ORDER — FUROSEMIDE 20 MG/1
20 TABLET ORAL DAILY
Qty: 5 TABLET | Refills: 0 | Status: SHIPPED | OUTPATIENT
Start: 2023-04-28 | End: 2023-05-03

## 2023-04-29 LAB — BACTERIA SPEC AEROBE CULT: NO GROWTH

## 2023-05-17 DIAGNOSIS — I10 ESSENTIAL HYPERTENSION: Primary | ICD-10-CM

## 2023-05-17 DIAGNOSIS — E78.2 MIXED HYPERLIPIDEMIA: ICD-10-CM

## 2023-05-17 DIAGNOSIS — R79.9 ABNORMAL BLOOD FINDINGS: ICD-10-CM

## 2023-05-17 LAB
BUN SERPL-MCNC: 37 MG/DL (ref 8–23)
BUN/CREAT SERPL: 25 (ref 7–25)
CALCIUM SERPL-MCNC: 9.9 MG/DL (ref 8.6–10.5)
CHLORIDE SERPL-SCNC: 105 MMOL/L (ref 98–107)
CO2 SERPL-SCNC: 25 MMOL/L (ref 22–29)
CREAT SERPL-MCNC: 1.48 MG/DL (ref 0.76–1.27)
EGFRCR SERPLBLD CKD-EPI 2021: 49 ML/MIN/1.73
GLUCOSE SERPL-MCNC: 126 MG/DL (ref 65–99)
POTASSIUM SERPL-SCNC: 4.9 MMOL/L (ref 3.5–5.2)
SODIUM SERPL-SCNC: 141 MMOL/L (ref 136–145)

## 2023-05-18 ENCOUNTER — TELEPHONE (OUTPATIENT)
Dept: INTERNAL MEDICINE | Facility: CLINIC | Age: 78
End: 2023-05-18
Payer: MEDICARE

## 2023-05-18 NOTE — TELEPHONE ENCOUNTER
Patient had BMP labs drawn yesterday (5/17/23) however did not have his BP checked while here. Left before BP was obtained  ----- Message from Juan A Rubi MD sent at 4/28/2023  7:37 AM EDT -----  Call pt again today.  I had reminder to call and check on post op blood pressure. However, Pt appears to be dealing with swelling based on his call to office and I think it is just best to get a renal check (BMP) and office based assessment next week if he is able.

## 2023-05-19 ENCOUNTER — OFFICE VISIT (OUTPATIENT)
Dept: INTERNAL MEDICINE | Facility: CLINIC | Age: 78
End: 2023-05-19
Payer: MEDICARE

## 2023-05-19 VITALS
WEIGHT: 276.1 LBS | BODY MASS INDEX: 37.4 KG/M2 | HEIGHT: 72 IN | RESPIRATION RATE: 18 BRPM | OXYGEN SATURATION: 98 % | DIASTOLIC BLOOD PRESSURE: 74 MMHG | HEART RATE: 53 BPM | SYSTOLIC BLOOD PRESSURE: 162 MMHG

## 2023-05-19 DIAGNOSIS — M70.62 TROCHANTERIC BURSITIS OF LEFT HIP: ICD-10-CM

## 2023-05-19 DIAGNOSIS — I10 ESSENTIAL HYPERTENSION: ICD-10-CM

## 2023-05-19 DIAGNOSIS — E66.01 MORBID OBESITY DUE TO EXCESS CALORIES: ICD-10-CM

## 2023-05-19 DIAGNOSIS — I10 UNCONTROLLED HYPERTENSION: Primary | ICD-10-CM

## 2023-05-19 RX ORDER — TORSEMIDE 5 MG/1
2.5 TABLET ORAL DAILY
Qty: 30 TABLET | Refills: 2 | Status: SHIPPED | OUTPATIENT
Start: 2023-05-19

## 2023-05-19 NOTE — PROGRESS NOTES
Post-op Follow-up (S/p lt hip ), Hypertension, and Results (Fup labs )      HPI  Dileep Hamilton is a 77 y.o. male RTC in f/u:   1. Severe L lateral hip pain, Mild arthritic changes of both hips without hip fracture and Chronic bilateral hamstring origin tendinopathy; issues > 1 year now -  Surgery 4/19/23 and 'the pain I had before surgery is gone'.   Incision pain noted, and is getting better. Saw ortho for f/u and no issues. Is not taking any pain meds at all.   2. HTN, uncontrolled -   Has been checking at home and getting good numbers.  Is taking 2 meds.  Leg swelling is better than last visit 'but not as bad'.   Did use Lasix short term and lost a few pounds but not gain all back.  Swelling is not progressively getting worse.  Wearing compression most days. Is able to get back to exercise in a few weeks. Working last 2 weeks and been busy with that. Plans to get back to recumbent bike.  Feels like would rather add an agent for ideal control at this point instead of waiting to see how does with exercise.     Review of Systems   Constitutional: Negative for chills, fever and malaise/fatigue.   Eyes: Negative for blurred vision.   Cardiovascular: Positive for leg swelling (B ankles, better than it was). Negative for chest pain, dyspnea on exertion, orthopnea, palpitations and paroxysmal nocturnal dyspnea.   Respiratory: Negative for shortness of breath.    Musculoskeletal: Negative for joint pain (L hip pain resolved) and neck pain.   Neurological: Negative for dizziness, headaches and light-headedness.       The following portions of the patient's history were reviewed and updated as appropriate: allergies, current medications, past medical history, past social history and problem list.      Current Outpatient Medications:   •  amLODIPine (NORVASC) 5 MG tablet, Take 2 tablets by mouth Daily., Disp: 180 tablet, Rfl: 1  •  Cholecalciferol (VITAMIN D3) 20 MCG (800 UNIT) tablet, Take 800 Int'l Units by mouth Daily.,  "Disp: 75 tablet, Rfl:   •  Coenzyme Q10 (COQ-10) 100 MG capsule, Take  by mouth., Disp: , Rfl:   •  Misc Natural Products (Osteo Bi-Flex Adv Triple St) tablet, , Disp: , Rfl:   •  Multiple Vitamin (MULTI VITAMIN DAILY PO), Take  by mouth., Disp: , Rfl:   •  olmesartan (BENICAR) 40 MG tablet, Take 1 tablet by mouth Daily., Disp: 90 tablet, Rfl: 1  •  Omega-3 Fatty Acids (FISH OIL) 1000 MG capsule capsule, Take  by mouth., Disp: , Rfl:   •  torsemide (DEMADEX) 5 MG tablet, Take 0.5 tablets by mouth Daily., Disp: 30 tablet, Rfl: 2    Vitals:    23 0837   BP: 162/74   Pulse: 53   Resp: 18   SpO2: 98%   Weight: 125 kg (276 lb 1.6 oz)   Height: 182.9 cm (72.01\")     Body mass index is 37.44 kg/m².      Physical Exam  Vitals reviewed.   Constitutional:       General: He is not in acute distress.     Appearance: He is well-developed. He is obese. He is not ill-appearing or toxic-appearing.   HENT:      Head: Normocephalic and atraumatic.      Mouth/Throat:      Mouth: No oral lesions.      Tongue: No lesions.      Pharynx: No pharyngeal swelling or uvula swelling.   Eyes:      General: No scleral icterus.  Neck:      Vascular: No carotid bruit.   Cardiovascular:      Rate and Rhythm: Normal rate and regular rhythm.      Pulses:           Carotid pulses are 2+ on the right side and 2+ on the left side.     Heart sounds: Normal heart sounds.   Pulmonary:      Effort: Pulmonary effort is normal. No respiratory distress.      Breath sounds: Normal breath sounds. No wheezing, rhonchi or rales.   Abdominal:      General: Abdomen is protuberant.   Musculoskeletal:      Cervical back: Normal range of motion and neck supple. No muscular tenderness.      Right lower le+ Pitting Edema (at ankles) present.      Left lower le+ Pitting Edema (at ankles) present.   Neurological:      Mental Status: He is alert and oriented to person, place, and time.      Cranial Nerves: No cranial nerve deficit.      Gait: Gait normal. "   Psychiatric:         Attention and Perception: Attention normal.         Mood and Affect: Mood and affect normal.         Behavior: Behavior normal.         Thought Content: Thought content normal.               Assessment/ Plan  Diagnoses and all orders for this visit:    Uncontrolled hypertension  -     torsemide (DEMADEX) 5 MG tablet; Take 0.5 tablets by mouth Daily.    Essential hypertension    Morbid obesity due to excess calories    Trochanteric bursitis of left hip        Return in about 3 months (around 8/19/2023) for Recheck.      Discussion:  Dileep Hamilton is a 77 y.o. male RTC in f/u:  1. Severe L lateral hip pain, Mild arthritic changes of both hips without hip fracture and Chronic bilateral hamstring origin tendinopathy - s/p LEFT HIP OPEN TROCHANTERIC BURSECTOMY, ILIOTIBIAL BAND TENOTOMY 4/19/23 with Dr. Yañez with resolved pre-op pain. Minimal 'incision pain' at this time, following up with ortho. Overall doing well and pleased with progress. Off all pain meds at this time.   2. HTN, uncontrolled -  recall, was uncontrolled despite change losartan to high dose longer acting olmesartan and addition of amlodipine 5mg daily at recent visits.  Cr went from 1.31 --> 1.44 with change, expected with baseline ~1-1.3.  Added chlorthalidone for persistent uncontrolled pressure and Cr progressed to 1.83.  D/C'd chlothalidone and increased amlodipine to 10mg daily. Renal duplex with normal RA B. Etiology of pressure progression pain with #1 vs. Decline in conditioning with #1 vs. undx'd AIDEE vs. Age related progression vs. Alternate secondary cause +/- CKD progression. Pressure with borderline control today on home log with cuff correlated at prior office visit.  Pt prefers med addition, though is planning restart exercise with issues #1.  Given CKD and some leg edema (suspect CCB effect on venous insufficiency), will add low dose loop diuretic with toresmide 2.5mg. Trend BMP and home log in 2-3 weeks, advance  dosing as tolerated with goal to reduce CCB dose over time. Weight loss needed. Readdress AIDEE eval at f/u.     RTC 3 months, F labs prior (CMP, CBC, A1C, U/A)  NF labs in 3 weeks (BMP, U/A)      Answers for HPI/ROS submitted by the patient on 5/12/2023  What is the primary reason for your visit?: High Blood Pressure  Chronicity: recurrent  Onset: more than 1 year ago  Progression since onset: waxing and waning  Condition status: resistant  anxiety: No  peripheral edema: Yes  sweats: No  Agents associated with hypertension: no associated agents  CAD risks: family history  Compliance problems: medication side effects

## 2023-05-22 RX ORDER — AMLODIPINE BESYLATE 5 MG/1
10 TABLET ORAL DAILY
Qty: 180 TABLET | Refills: 2 | Status: CANCELLED | OUTPATIENT
Start: 2023-05-22

## 2023-05-31 ENCOUNTER — LAB (OUTPATIENT)
Dept: OTHER | Facility: HOSPITAL | Age: 78
End: 2023-05-31

## 2023-05-31 ENCOUNTER — OFFICE VISIT (OUTPATIENT)
Dept: ONCOLOGY | Facility: CLINIC | Age: 78
End: 2023-05-31

## 2023-05-31 VITALS
BODY MASS INDEX: 37.67 KG/M2 | HEART RATE: 58 BPM | TEMPERATURE: 97.5 F | OXYGEN SATURATION: 96 % | WEIGHT: 278.1 LBS | DIASTOLIC BLOOD PRESSURE: 76 MMHG | HEIGHT: 72 IN | RESPIRATION RATE: 18 BRPM | SYSTOLIC BLOOD PRESSURE: 160 MMHG

## 2023-05-31 DIAGNOSIS — C91.Z0 LARGE GRANULAR LYMPHOCYTE DISORDER: Primary | ICD-10-CM

## 2023-05-31 DIAGNOSIS — E78.2 MIXED HYPERLIPIDEMIA: Primary | ICD-10-CM

## 2023-05-31 DIAGNOSIS — Z85.038 HISTORY OF COLON CANCER: ICD-10-CM

## 2023-05-31 DIAGNOSIS — D72.820 LYMPHOCYTOSIS: ICD-10-CM

## 2023-05-31 DIAGNOSIS — D64.9 NORMOCHROMIC NORMOCYTIC ANEMIA: ICD-10-CM

## 2023-05-31 DIAGNOSIS — I10 ESSENTIAL HYPERTENSION: ICD-10-CM

## 2023-05-31 DIAGNOSIS — E55.9 VITAMIN D DEFICIENCY: ICD-10-CM

## 2023-05-31 LAB
BASOPHILS # BLD AUTO: 0.03 10*3/MM3 (ref 0–0.2)
BASOPHILS NFR BLD AUTO: 0.3 % (ref 0–1.5)
DEPRECATED RDW RBC AUTO: 44 FL (ref 37–54)
EOSINOPHIL # BLD AUTO: 0.21 10*3/MM3 (ref 0–0.4)
EOSINOPHIL NFR BLD AUTO: 1.8 % (ref 0.3–6.2)
ERYTHROCYTE [DISTWIDTH] IN BLOOD BY AUTOMATED COUNT: 13.1 % (ref 12.3–15.4)
FOLATE SERPL-MCNC: 6.51 NG/ML (ref 4.78–24.2)
HCT VFR BLD AUTO: 39.2 % (ref 37.5–51)
HGB BLD-MCNC: 13.2 G/DL (ref 13–17.7)
IMM GRANULOCYTES # BLD AUTO: 0.03 10*3/MM3 (ref 0–0.05)
IMM GRANULOCYTES NFR BLD AUTO: 0.3 % (ref 0–0.5)
LYMPHOCYTES # BLD AUTO: 4.36 10*3/MM3 (ref 0.7–3.1)
LYMPHOCYTES NFR BLD AUTO: 37.9 % (ref 19.6–45.3)
MCH RBC QN AUTO: 31 PG (ref 26.6–33)
MCHC RBC AUTO-ENTMCNC: 33.7 G/DL (ref 31.5–35.7)
MCV RBC AUTO: 92 FL (ref 79–97)
MONOCYTES # BLD AUTO: 0.7 10*3/MM3 (ref 0.1–0.9)
MONOCYTES NFR BLD AUTO: 6.1 % (ref 5–12)
NEUTROPHILS NFR BLD AUTO: 53.6 % (ref 42.7–76)
NEUTROPHILS NFR BLD AUTO: 6.18 10*3/MM3 (ref 1.7–7)
NRBC BLD AUTO-RTO: 0 /100 WBC (ref 0–0.2)
PLATELET # BLD AUTO: 343 10*3/MM3 (ref 140–450)
PMV BLD AUTO: 10.2 FL (ref 6–12)
RBC # BLD AUTO: 4.26 10*6/MM3 (ref 4.14–5.8)
VIT B12 BLD-MCNC: 575 PG/ML (ref 211–946)
WBC NRBC COR # BLD: 11.51 10*3/MM3 (ref 3.4–10.8)

## 2023-05-31 PROCEDURE — 1126F AMNT PAIN NOTED NONE PRSNT: CPT | Performed by: INTERNAL MEDICINE

## 2023-05-31 PROCEDURE — 82607 VITAMIN B-12: CPT | Performed by: INTERNAL MEDICINE

## 2023-05-31 PROCEDURE — 1159F MED LIST DOCD IN RCRD: CPT | Performed by: INTERNAL MEDICINE

## 2023-05-31 PROCEDURE — 99214 OFFICE O/P EST MOD 30 MIN: CPT | Performed by: INTERNAL MEDICINE

## 2023-05-31 PROCEDURE — 3077F SYST BP >= 140 MM HG: CPT | Performed by: INTERNAL MEDICINE

## 2023-05-31 PROCEDURE — 82746 ASSAY OF FOLIC ACID SERUM: CPT | Performed by: INTERNAL MEDICINE

## 2023-05-31 PROCEDURE — 85025 COMPLETE CBC W/AUTO DIFF WBC: CPT | Performed by: INTERNAL MEDICINE

## 2023-05-31 PROCEDURE — 3078F DIAST BP <80 MM HG: CPT | Performed by: INTERNAL MEDICINE

## 2023-05-31 PROCEDURE — 1160F RVW MEDS BY RX/DR IN RCRD: CPT | Performed by: INTERNAL MEDICINE

## 2023-05-31 PROCEDURE — 36415 COLL VENOUS BLD VENIPUNCTURE: CPT

## 2023-05-31 NOTE — PROGRESS NOTES
"Subjective     CHIEF COMPLAINT:      Chief Complaint   Patient presents with   • Follow-up     No concerns     HISTORY OF PRESENT ILLNESS:     Dileep Hamilton is a 77 y.o. male patient who returns today for follow up on his lymphocytosis.  He returns today for follow-up reporting that he had a procedure for the left hip bursitis.  He is feeling better since he had that done.    Patient is not having fever or chills.  He did not notice lymph node enlargement.    ROS:  Pertinent ROS is in the HPI.     Past medical, surgical, social and family history were reviewed.     MEDICATIONS:    Current Outpatient Medications:   •  amLODIPine (NORVASC) 5 MG tablet, Take 2 tablets by mouth Daily., Disp: 180 tablet, Rfl: 1  •  Cholecalciferol (VITAMIN D3) 20 MCG (800 UNIT) tablet, Take 800 Int'l Units by mouth Daily., Disp: 75 tablet, Rfl:   •  Coenzyme Q10 (COQ-10) 100 MG capsule, Take 1 capsule by mouth Daily., Disp: , Rfl:   •  Multiple Vitamin (MULTI VITAMIN DAILY PO), Take 1 tablet by mouth Daily., Disp: , Rfl:   •  olmesartan (BENICAR) 40 MG tablet, Take 1 tablet by mouth Daily., Disp: 90 tablet, Rfl: 1  •  Omega-3 Fatty Acids (FISH OIL) 1000 MG capsule capsule, Take 1 capsule by mouth Daily With Breakfast., Disp: , Rfl:   •  torsemide (DEMADEX) 5 MG tablet, Take 0.5 tablets by mouth Daily., Disp: 30 tablet, Rfl: 2  Objective     VITAL SIGNS:     Vitals:    05/31/23 1549   BP: 160/76   Pulse: 58   Resp: 18   Temp: 97.5 °F (36.4 °C)   TempSrc: Temporal   SpO2: 96%   Weight: 126 kg (278 lb 1.6 oz)   Height: 182.9 cm (72.01\")   PainSc: 0-No pain     Body mass index is 37.71 kg/m².     Wt Readings from Last 5 Encounters:   05/31/23 126 kg (278 lb 1.6 oz)   05/19/23 125 kg (276 lb 1.6 oz)   04/14/23 129 kg (283 lb 6.4 oz)   03/22/23 129 kg (283 lb 12.8 oz)   03/03/23 127 kg (279 lb 11.2 oz)     PHYSICAL EXAMINATION:   GENERAL: The patient appears in good general condition, not in acute distress.   SKIN: No ecchymosis.  EYES: No " jaundice. No pallor.  LYMPHATICS: No cervical, supraclavicular or axillary lymphadenopathy.  CHEST: Normal respiratory effort.   CVS: No edema.  ABDOMEN: Soft. No tenderness. No Hepatomegaly. No Splenomegaly. No masses.  EXTREMITIES: No noted deformity.     DIAGNOSTIC DATA:     Results from last 7 days   Lab Units 05/31/23  1542   WBC 10*3/mm3 11.51*   NEUTROS ABS 10*3/mm3 6.18   HEMOGLOBIN g/dL 13.2   HEMATOCRIT % 39.2   PLATELETS 10*3/mm3 343     Component      Latest Ref Rng 3/22/2023 4/28/2023 5/31/2023   Neutrophils Absolute      1.70 - 7.00 10*3/mm3 6.31  5.56  6.18    Lymphocytes Absolute      0.70 - 3.10 10*3/mm3 3.78 (H)  3.76 (H)  4.36 (H)    Monocytes Absolute      0.10 - 0.90 10*3/mm3 0.61  0.62  0.70    Eosinophils Absolute      0.00 - 0.40 10*3/mm3 0.18  0.18  0.21    Basophils Absolute      0.00 - 0.20 10*3/mm3 0.03  0.04  0.03    Immature Grans, Absolute      0.00 - 0.05 10*3/mm3 0.03  0.03  0.03          Lab 05/31/23  1542   FOLATE 6.51   VITAMIN B 12 575      Assessment & Plan    *Lymphocytosis.  · It dates back to 2015 when lymphocyte count was 4600 on 10/22/2015.  · Lymphocyte count was 4540 on 1/27/2023.  · Lymphocyte count was 3780 on 3/22/2023.  · Peripheral blood flow cytometry on 3/22/2023 CD3 positive, CD8 positive, T cell /large granular lymphocyte population representing 10% of the total events suspicious for a clonal population by T-cell receptor analysis.  · Lymphocyte count is 4360 today.  · Patient has no lymphadenopathy or splenomegaly on exam.  · Based on the percentage of the lymphocytes, total LGL population is about 1000.  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  · He does not appear to be symptomatic from this population of cells.  · I recommended monitoring of his blood counts.    · I explained that if the cells increase and are persistently >1500, this would be considered to represent LGL leukemia.  · LGL leukemia is a chronic blood disorder that may result in  some patients and the development of cytopenias, lymphadenopathy, splenomegaly or manifestations in the joints of skin.    *Normochromic normocytic anemia.  · Hemoglobin was 13.4 on 12/3/2019  · Hemoglobin was 13.4 on 3/22/2023.  · Hemoglobin is 13.2 today.  · Vitamin B12 and folate levels are normal.     PLAN:    1.  We will monitor the blood counts.  There is no indication for treatment.   2.  We will see him in follow-up in 6 months with CBC, reticulocyte count and LDH.  We will see him sooner if there are any changes.      Ruth Salter MD  05/31/23

## 2023-06-03 LAB
APPEARANCE UR: CLEAR
BACTERIA #/AREA URNS HPF: NORMAL /HPF
BILIRUB UR QL STRIP: NEGATIVE
BUN SERPL-MCNC: 41 MG/DL (ref 8–23)
BUN/CREAT SERPL: 26.8 (ref 7–25)
CALCIUM SERPL-MCNC: 9.9 MG/DL (ref 8.6–10.5)
CASTS URNS QL MICRO: NORMAL /LPF
CHLORIDE SERPL-SCNC: 102 MMOL/L (ref 98–107)
CO2 SERPL-SCNC: 28.7 MMOL/L (ref 22–29)
COLOR UR: YELLOW
CREAT SERPL-MCNC: 1.53 MG/DL (ref 0.76–1.27)
EGFRCR SERPLBLD CKD-EPI 2021: 47.1 ML/MIN/1.73
EPI CELLS #/AREA URNS HPF: NORMAL /HPF (ref 0–10)
GLUCOSE SERPL-MCNC: 136 MG/DL (ref 65–99)
GLUCOSE UR QL STRIP: NEGATIVE
HGB UR QL STRIP: NEGATIVE
KETONES UR QL STRIP: NEGATIVE
LEUKOCYTE ESTERASE UR QL STRIP: NEGATIVE
MICRO URNS: NORMAL
MICRO URNS: NORMAL
NITRITE UR QL STRIP: NEGATIVE
PH UR STRIP: 6.5 [PH] (ref 5–7.5)
POTASSIUM SERPL-SCNC: 4.9 MMOL/L (ref 3.5–5.2)
PROT UR QL STRIP: NEGATIVE
RBC #/AREA URNS HPF: NORMAL /HPF (ref 0–2)
SODIUM SERPL-SCNC: 140 MMOL/L (ref 136–145)
SP GR UR STRIP: 1.01 (ref 1–1.03)
URINALYSIS REFLEX: NORMAL
UROBILINOGEN UR STRIP-MCNC: 0.2 MG/DL (ref 0.2–1)
WBC #/AREA URNS HPF: NORMAL /HPF (ref 0–5)

## 2023-08-15 DIAGNOSIS — I10 ESSENTIAL HYPERTENSION: Primary | ICD-10-CM

## 2023-08-15 DIAGNOSIS — E11.9 CONTROLLED TYPE 2 DIABETES MELLITUS WITHOUT COMPLICATION, WITHOUT LONG-TERM CURRENT USE OF INSULIN: ICD-10-CM

## 2023-08-15 DIAGNOSIS — E78.2 MIXED HYPERLIPIDEMIA: ICD-10-CM

## 2023-08-17 LAB
ALBUMIN SERPL-MCNC: 4.4 G/DL (ref 3.5–5.2)
ALBUMIN/GLOB SERPL: 1.8 G/DL
ALP SERPL-CCNC: 101 U/L (ref 39–117)
ALT SERPL-CCNC: 14 U/L (ref 1–41)
APPEARANCE UR: CLEAR
AST SERPL-CCNC: 13 U/L (ref 1–40)
BACTERIA #/AREA URNS HPF: NORMAL /HPF
BASOPHILS # BLD AUTO: 0.03 10*3/MM3 (ref 0–0.2)
BASOPHILS NFR BLD AUTO: 0.3 % (ref 0–1.5)
BILIRUB SERPL-MCNC: 0.6 MG/DL (ref 0–1.2)
BILIRUB UR QL STRIP: NEGATIVE
BUN SERPL-MCNC: 57 MG/DL (ref 8–23)
BUN/CREAT SERPL: 30 (ref 7–25)
CALCIUM SERPL-MCNC: 9.8 MG/DL (ref 8.6–10.5)
CASTS URNS QL MICRO: NORMAL /LPF
CHLORIDE SERPL-SCNC: 105 MMOL/L (ref 98–107)
CO2 SERPL-SCNC: 24 MMOL/L (ref 22–29)
COLOR UR: YELLOW
CREAT SERPL-MCNC: 1.9 MG/DL (ref 0.76–1.27)
EGFRCR SERPLBLD CKD-EPI 2021: 36.1 ML/MIN/1.73
EOSINOPHIL # BLD AUTO: 0.2 10*3/MM3 (ref 0–0.4)
EOSINOPHIL NFR BLD AUTO: 2 % (ref 0.3–6.2)
EPI CELLS #/AREA URNS HPF: NORMAL /HPF (ref 0–10)
ERYTHROCYTE [DISTWIDTH] IN BLOOD BY AUTOMATED COUNT: 14 % (ref 12.3–15.4)
GLOBULIN SER CALC-MCNC: 2.5 GM/DL
GLUCOSE SERPL-MCNC: 123 MG/DL (ref 65–99)
GLUCOSE UR QL STRIP: NEGATIVE
HBA1C MFR BLD: 6.4 % (ref 4.8–5.6)
HCT VFR BLD AUTO: 40.8 % (ref 37.5–51)
HGB BLD-MCNC: 13.7 G/DL (ref 13–17.7)
HGB UR QL STRIP: NEGATIVE
IMM GRANULOCYTES # BLD AUTO: 0.02 10*3/MM3 (ref 0–0.05)
IMM GRANULOCYTES NFR BLD AUTO: 0.2 % (ref 0–0.5)
KETONES UR QL STRIP: NEGATIVE
LEUKOCYTE ESTERASE UR QL STRIP: NEGATIVE
LYMPHOCYTES # BLD AUTO: 4.73 10*3/MM3 (ref 0.7–3.1)
LYMPHOCYTES NFR BLD AUTO: 46.8 % (ref 19.6–45.3)
MCH RBC QN AUTO: 30.4 PG (ref 26.6–33)
MCHC RBC AUTO-ENTMCNC: 33.6 G/DL (ref 31.5–35.7)
MCV RBC AUTO: 90.5 FL (ref 79–97)
MICRO URNS: NORMAL
MICRO URNS: NORMAL
MONOCYTES # BLD AUTO: 0.58 10*3/MM3 (ref 0.1–0.9)
MONOCYTES NFR BLD AUTO: 5.7 % (ref 5–12)
NEUTROPHILS # BLD AUTO: 4.55 10*3/MM3 (ref 1.7–7)
NEUTROPHILS NFR BLD AUTO: 45 % (ref 42.7–76)
NITRITE UR QL STRIP: NEGATIVE
NRBC BLD AUTO-RTO: 0.1 /100 WBC (ref 0–0.2)
PH UR STRIP: 6 [PH] (ref 5–7.5)
PLATELET # BLD AUTO: 310 10*3/MM3 (ref 140–450)
POTASSIUM SERPL-SCNC: 5.1 MMOL/L (ref 3.5–5.2)
PROT SERPL-MCNC: 6.9 G/DL (ref 6–8.5)
PROT UR QL STRIP: NEGATIVE
RBC # BLD AUTO: 4.51 10*6/MM3 (ref 4.14–5.8)
RBC #/AREA URNS HPF: NORMAL /HPF (ref 0–2)
SODIUM SERPL-SCNC: 140 MMOL/L (ref 136–145)
SP GR UR STRIP: 1.02 (ref 1–1.03)
URINALYSIS REFLEX: NORMAL
UROBILINOGEN UR STRIP-MCNC: 0.2 MG/DL (ref 0.2–1)
WBC # BLD AUTO: 10.11 10*3/MM3 (ref 3.4–10.8)
WBC #/AREA URNS HPF: NORMAL /HPF (ref 0–5)

## 2023-08-23 ENCOUNTER — OFFICE VISIT (OUTPATIENT)
Dept: INTERNAL MEDICINE | Facility: CLINIC | Age: 78
End: 2023-08-23
Payer: MEDICARE

## 2023-08-23 VITALS
HEART RATE: 62 BPM | DIASTOLIC BLOOD PRESSURE: 62 MMHG | SYSTOLIC BLOOD PRESSURE: 146 MMHG | HEIGHT: 72 IN | BODY MASS INDEX: 36.61 KG/M2 | TEMPERATURE: 97.5 F | WEIGHT: 270.3 LBS | OXYGEN SATURATION: 97 %

## 2023-08-23 DIAGNOSIS — R29.818 SUSPECTED SLEEP APNEA: ICD-10-CM

## 2023-08-23 DIAGNOSIS — I10 ESSENTIAL HYPERTENSION: ICD-10-CM

## 2023-08-23 DIAGNOSIS — E66.01 MORBID OBESITY DUE TO EXCESS CALORIES: ICD-10-CM

## 2023-08-23 DIAGNOSIS — E11.9 CONTROLLED TYPE 2 DIABETES MELLITUS WITHOUT COMPLICATION, WITHOUT LONG-TERM CURRENT USE OF INSULIN: Primary | ICD-10-CM

## 2023-08-23 DIAGNOSIS — M16.0 PRIMARY OSTEOARTHRITIS OF BOTH HIPS: ICD-10-CM

## 2023-08-23 DIAGNOSIS — R79.89 ELEVATED SERUM CREATININE: ICD-10-CM

## 2023-08-23 RX ORDER — CHLORTHALIDONE 25 MG/1
TABLET ORAL
COMMUNITY
Start: 2023-07-22 | End: 2023-08-23

## 2023-08-23 NOTE — PROGRESS NOTES
"Hypertension (3 month f/u)      HPI  Dileep Hamilton is a 78 y.o. male RTC In f/u:  \"not too bad\".   1. Severe L lateral hip pain, Mild arthritic changes of both hips without hip fracture and Chronic bilateral hamstring origin tendinopathy - s/p LEFT HIP OPEN TROCHANTERIC BURSECTOMY, ILIOTIBIAL BAND TENOTOMY 4/19/23 with Dr. Yañez with resolved pre-op pain. Had been doing well until last few weeks and now going back to see Dr. Powell. No meds used at all. \"Advil does not touch it. I have tried some\". Has been using Advil 1-2x/ week, last dosing last week. Doing PT exercises as well.    2. HTN, uncontrolled -blood pressure meds working well. No leg swelling other that long term mild ankle swelling. Saw Cr elevation on labs, 'I think I have an explanation for that'.  Pt had accidentally been taking chlorthalidone for last 30 days along with torsemide. 'I did not realize I was taking that.  I  have since taken pills out and thrown the bottle away (for chlorthalidone)'.    3. Suspected AIDEE - saw sleep med in 2022, sleep study deferred as pt decliend CPAP use if positive and declined snoring hx. Pt continues to decline study today. Still feels like sleeps well.  4. DMII with long hx obesity -  \"I am trying to do better'. Has had some celebrations and 'not watched as closely as I should be'.  Does have routine most days.  Is high protein diet mostly. Dinner is more variable, but breakfast and lunch is high protein and set.  Dinner can be higher calorie, larger volume. Reduced snacking.  Is playing some golf and getting back to bowling.        Answers submitted by the patient for this visit:  Primary Reason for Visit (Submitted on 8/16/2023)  What is the primary reason for your visit?: High Blood Pressure  High Blood Pressure Questionnaire (Submitted on 8/16/2023)  Chief Complaint: Hypertension  Chronicity: recurrent  Onset: more than 1 year ago  Progression since onset: unchanged  Condition status: resistant  anxiety: " "No  peripheral edema: No  sweats: No  Agents associated with hypertension: no associated agents  CAD risks: no known risk factors, family history, obesity  Compliance problems: medication side effects      Review of Systems   Constitutional: Negative for chills, fever and weight loss.   Cardiovascular:  Negative for chest pain, dyspnea on exertion, irregular heartbeat, near-syncope and syncope.   Respiratory:  Positive for snoring.    Musculoskeletal:  Positive for joint pain.   Gastrointestinal:  Negative for nausea and vomiting.   Neurological:  Negative for dizziness and light-headedness.     The following portions of the patient's history were reviewed and updated as appropriate: allergies, current medications, past medical history, past social history, and problem list.      Current Outpatient Medications:     amLODIPine (NORVASC) 5 MG tablet, Take 2 tablets by mouth Daily., Disp: 180 tablet, Rfl: 1    Cholecalciferol (VITAMIN D3) 20 MCG (800 UNIT) tablet, Take 800 Int'l Units by mouth Daily., Disp: 75 tablet, Rfl:     Coenzyme Q10 (COQ-10) 100 MG capsule, Take 1 capsule by mouth Daily., Disp: , Rfl:     Multiple Vitamin (MULTI VITAMIN DAILY PO), Take 1 tablet by mouth Daily., Disp: , Rfl:     olmesartan (BENICAR) 40 MG tablet, Take 1 tablet by mouth Daily., Disp: 90 tablet, Rfl: 1    Omega-3 Fatty Acids (FISH OIL) 1000 MG capsule capsule, Take 1 capsule by mouth Daily With Breakfast., Disp: , Rfl:     torsemide (DEMADEX) 5 MG tablet, Take 0.5 tablets by mouth Daily., Disp: 30 tablet, Rfl: 2    Vitals:    08/23/23 1057   BP: 146/62   BP Location: Left arm   Patient Position: Sitting   Cuff Size: Adult   Pulse: 62   Temp: 97.5 øF (36.4 øC)   TempSrc: Oral   SpO2: 97%   Weight: 123 kg (270 lb 4.8 oz)   Height: 182.9 cm (72.01\")     Body mass index is 36.65 kg/mý.      Physical Exam  Vitals reviewed.   Constitutional:       General: He is not in acute distress.     Appearance: He is well-developed. He is obese. He " is not ill-appearing or toxic-appearing.   HENT:      Head: Normocephalic and atraumatic.      Mouth/Throat:      Mouth: Mucous membranes are moist. No oral lesions.      Tongue: No lesions.      Pharynx: Oropharynx is clear. No pharyngeal swelling, oropharyngeal exudate, posterior oropharyngeal erythema or uvula swelling.   Eyes:      General: No scleral icterus.     Conjunctiva/sclera: Conjunctivae normal.      Pupils: Pupils are equal, round, and reactive to light.   Neck:      Vascular: No carotid bruit.   Cardiovascular:      Rate and Rhythm: Normal rate and regular rhythm.      Pulses:           Carotid pulses are 2+ on the right side and 2+ on the left side.       Radial pulses are 2+ on the right side and 2+ on the left side.      Heart sounds: Normal heart sounds. Murmur (RUSB) heard.   Systolic murmur is present with a grade of 3/6.   Pulmonary:      Effort: Pulmonary effort is normal. No respiratory distress.      Breath sounds: Normal breath sounds. No wheezing, rhonchi or rales.   Musculoskeletal:      Cervical back: Normal range of motion and neck supple. No muscular tenderness.   Lymphadenopathy:      Cervical: No cervical adenopathy.   Neurological:      Mental Status: He is alert and oriented to person, place, and time.      Cranial Nerves: No cranial nerve deficit.      Gait: Gait normal.   Psychiatric:         Attention and Perception: Attention normal.         Mood and Affect: Mood and affect normal.         Behavior: Behavior normal.         Thought Content: Thought content normal.         Assessment/ Plan  Diagnoses and all orders for this visit:    Controlled type 2 diabetes mellitus without complication, without long-term current use of insulin    Essential hypertension    Morbid obesity due to excess calories    Primary osteoarthritis of both hips    Suspected sleep apnea    Other orders  -     Discontinue: chlorthalidone (HYGROTON) 25 MG tablet        Return in about 6 months (around  2/23/2024) for Medicare Wellness.      Discussion:  Dileep Hamilton is a 78 y.o. male RTC In f/u:   1. Severe L lateral hip pain, Mild arthritic changes of both hips without hip fracture and Chronic bilateral hamstring origin tendinopathy - s/p LEFT HIP OPEN TROCHANTERIC BURSECTOMY, ILIOTIBIAL BAND TENOTOMY 4/19/23 with Dr. Yañez with resolved pre-op pain. Some mild recurrence, has sports med f/u upcoming. Defer to their assessment.   2. HTN, controlled - blood pressure controlled on home log. Recall, was uncontrolled despite change losartan to high dose longer acting olmesartan and addition of amlodipine 5mg daily at recent visits.  Cr went from 1.31 --> 1.44 with change, expected with baseline ~1-1.3.  Added chlorthalidone for persistent uncontrolled pressure and Cr progressed to 1.83.  D/C'd chlothalidone and increased amlodipine to 10mg daily. Renal duplex with normal RA B. Etiology of pressure progression  Decline in conditioning with #1 vs. undx'd AIDEE vs. Age related progression.   Add low dose loop diuretic with toresmide 2.5mg last visit, good home log trend.  Elevated Cr again noted on labs at 1.9, but pt admits has been taking thiazide AND loop diuretic in last month on accident, now off x 3 days.  Trend home log in 2-3 weeks, reminder set, and determine repeat BMP time frame.   3. Suspected AIDEE - saw sleep med in 2022, sleep study deferred as pt decliend CPAP use if positive and declined snoring hx. Pt continues to decline today, despite counseling.   4. Lymphocytosis - noted on CBC, progressive again today. S/p hematology eval, ?? Developing LGL leukemia.  Reassured overall with indolent nature of issues. Monitor per Heme.   5. DMII with long hx obesity -  A1C stable, remains diet controlled. Weight loss and AIDEE tx advised.     RTC 6 months AWV, F labs prior

## 2023-08-23 NOTE — Clinical Note
Call pt on blood pressure log, need list of numbers.  Also, confirm is off thiazide diuretic and taking torsemide as per our prior visit disscussion.

## 2023-09-01 ENCOUNTER — OFFICE VISIT (OUTPATIENT)
Dept: SPORTS MEDICINE | Facility: CLINIC | Age: 78
End: 2023-09-01
Payer: MEDICARE

## 2023-09-01 VITALS
SYSTOLIC BLOOD PRESSURE: 140 MMHG | OXYGEN SATURATION: 99 % | WEIGHT: 271 LBS | DIASTOLIC BLOOD PRESSURE: 68 MMHG | HEIGHT: 72 IN | BODY MASS INDEX: 36.7 KG/M2 | TEMPERATURE: 98.3 F | HEART RATE: 65 BPM

## 2023-09-01 DIAGNOSIS — M25.552 GREATER TROCHANTERIC PAIN SYNDROME OF LEFT LOWER EXTREMITY: ICD-10-CM

## 2023-09-01 DIAGNOSIS — M25.552 LEFT HIP PAIN: Primary | ICD-10-CM

## 2023-09-01 PROCEDURE — 3078F DIAST BP <80 MM HG: CPT | Performed by: FAMILY MEDICINE

## 2023-09-01 PROCEDURE — 3077F SYST BP >= 140 MM HG: CPT | Performed by: FAMILY MEDICINE

## 2023-09-01 PROCEDURE — 99214 OFFICE O/P EST MOD 30 MIN: CPT | Performed by: FAMILY MEDICINE

## 2023-09-01 NOTE — PROGRESS NOTES
"Dileep is a 78 y.o. year old male     Chief Complaint   Patient presents with    Hip Pain     LEFT HIP- Patient is here for further eval of left hip pain, patient states pain started again 3 weeks ago, NKI and no recent imaging.        History of Present Illness  HPI   Here today for recurrent pain in the left hip.  I saw him for this many times in the past, after multiple other opinions he eventually had a bursectomy with Dr. Yañez in April.  He states he was pain-free initially and was very happy with this but over the past few weeks his pain began to return.  It is not improved with Aleve.  He states he did not have much with regard to therapy afterwards and only had a single postop follow-up visit a few weeks after surgery.  Of note he had an epidural injection that did not give any relief prior to his surgery.    Review of Systems    /68 (BP Location: Right arm, Patient Position: Sitting, Cuff Size: Adult)   Pulse 65   Temp 98.3 øF (36.8 øC) (Temporal)   Ht 182.9 cm (72.01\")   Wt 123 kg (271 lb)   SpO2 99%   BMI 36.74 kg/mý      Physical Exam    Vital signs reviewed.   General: No acute distress.      MS[facility] Exam:  Ortho Exam    Left hip: There is tenderness palpation directly on the greater trochanter.  There is normal range of motion of the hip.  There is pain with adduction stretch and with resisted AB duction.  There is no pain with resisted external rotation.    Diagnoses and all orders for this visit:    Left hip pain  -     Ambulatory Referral to Physical Therapy Evaluate and treat  -     Ibuprofen 3 %, Gabapentin 10 %, Baclofen 2 %, lidocaine 4 %; Apply 1-2 g topically to the appropriate area as directed 3 (Three) to 4 (Four) times daily.    Greater trochanteric pain syndrome of left lower extremity  -     Ambulatory Referral to Physical Therapy Evaluate and treat  -     Ibuprofen 3 %, Gabapentin 10 %, Baclofen 2 %, lidocaine 4 %; Apply 1-2 g topically to the appropriate area as directed 3 " (Three) to 4 (Four) times daily.      I reviewed his records from pain management and Dr. Yañez.  According to his operative note on April 19 his gluteal tendons were evaluated during surgery and no tears were identified.  He had excision of the greater trochanteric bursa and a window cut in the IT band.  We will try adding a topical compound for possible persistent hypersensitivity pain in this location, try some physical therapy for desensitization, but also recommend that he follow-up with Dr. Yañez again to get his opinion for other possible strategies.  We did discuss the potential chronic nature of greater trochanteric pain, and other possible etiologies and contributing factors including nerve hypersensitivity or recurrent stress at the friction site.    EMR Dragon/Transcription disclaimer:    Much of this encounter note is an electronic transcription/translation of spoken language to printed text.  The electronic translation of spoken language may permit erroneous, or at times, nonsensical words or phrases to be inadvertently transcribed.  Although I have reviewed the note for such errors some may still exist.

## 2023-09-13 DIAGNOSIS — I10 ESSENTIAL HYPERTENSION: ICD-10-CM

## 2023-09-13 DIAGNOSIS — I10 UNCONTROLLED HYPERTENSION: ICD-10-CM

## 2023-09-13 RX ORDER — OLMESARTAN MEDOXOMIL 40 MG/1
TABLET ORAL
Qty: 90 TABLET | Refills: 0 | Status: SHIPPED | OUTPATIENT
Start: 2023-09-13

## 2023-10-17 DIAGNOSIS — I10 UNCONTROLLED HYPERTENSION: ICD-10-CM

## 2023-10-17 DIAGNOSIS — I10 ESSENTIAL HYPERTENSION: ICD-10-CM

## 2023-10-17 RX ORDER — CHLORTHALIDONE 25 MG/1
25 TABLET ORAL DAILY
Qty: 90 TABLET | Refills: 0 | OUTPATIENT
Start: 2023-10-17

## 2023-11-02 DIAGNOSIS — I10 UNCONTROLLED HYPERTENSION: ICD-10-CM

## 2023-11-02 RX ORDER — TORSEMIDE 5 MG/1
2.5 TABLET ORAL DAILY
Qty: 30 TABLET | Refills: 0 | Status: SHIPPED | OUTPATIENT
Start: 2023-11-02

## 2023-11-08 ENCOUNTER — OFFICE VISIT (OUTPATIENT)
Dept: SPORTS MEDICINE | Facility: CLINIC | Age: 78
End: 2023-11-08
Payer: MEDICARE

## 2023-11-08 VITALS
TEMPERATURE: 92.3 F | DIASTOLIC BLOOD PRESSURE: 54 MMHG | OXYGEN SATURATION: 98 % | BODY MASS INDEX: 36.74 KG/M2 | HEART RATE: 63 BPM | SYSTOLIC BLOOD PRESSURE: 116 MMHG | HEIGHT: 72 IN

## 2023-11-08 DIAGNOSIS — N17.9 AKI (ACUTE KIDNEY INJURY): Primary | ICD-10-CM

## 2023-11-08 DIAGNOSIS — G89.29 CHRONIC LEFT HIP PAIN: Primary | ICD-10-CM

## 2023-11-08 DIAGNOSIS — M25.552 CHRONIC LEFT HIP PAIN: Primary | ICD-10-CM

## 2023-11-08 NOTE — PROGRESS NOTES
"Dileep is a 78 y.o. year old male     Chief Complaint   Patient presents with    Hip Pain     LEFT HIP- Patient is here to follow on left hip pain, patient had MRI done on 10/30/23.        History of Present Illness  HPI   Here to follow-up on chronic left hip pain.  He recently had follow-up with Dr. Yañez including of repeat MRI performed on 10/30/2023.  He still has pain mostly in the lateral left hip that is worse with activity.  His MRI showed a fluid collection in the region of the previous bursectomy, also chronic labrum damage.    Review of Systems    /54 (BP Location: Right arm, Patient Position: Sitting, Cuff Size: Adult)   Pulse 63   Temp 92.3 °F (33.5 °C) (Temporal)   Ht 182.9 cm (72.01\")   SpO2 98%   BMI 36.74 kg/m²      Physical Exam    Vital signs reviewed.   General: No acute distress.      MSK Exam:  Ortho Exam  Left hip: Normal appearance.  There is tenderness palpation of the greater trochanter.  There is mild discomfort with adduction stretch and with resisted AB duction.  There is little to no pain with impingement maneuvers.    Procedure note: Aspiration of fluid collection of the left hip  We discussed indications for the procedure as well as risks, benefits, and alternatives.  Verbal consent was verified. Procedure was performed by physician.   Fluid collection identified on MRI was visualized with ultrasound.  This localizes overlying the greater trochanter, questionable if this is the bursa or a postsurgical seroma.  Site for needle injury for aspiration was marked, prepped with Betadine and alcohol for sterile technique.  Ethyl chloride spray was used and then as an 18-gauge 3.5 inch needle was advanced under continuous direct ultrasound visualization, 2 mL 1% plain lidocaine was intermittently applied for local anesthesia.  Once the needle reached the fluid collection, approximately 2 mL of serosanguineous fluid was successfully aspirated.  Needle was removed and a simple " bandage was applied.      Procedures     Diagnoses and all orders for this visit:    Chronic left hip pain      Discussed options in detail with the patient.  We agreed on diagnostic aspiration of the fluid collection on the left hip to see how much this affects things.  This could be a postsurgical seroma causing pain, alternatively simply inflammatory response to trauma there.  We will plan to follow-up in about 2 weeks and consider intra-articular injection also from a diagnostic/therapeutic approach with the thought of assessing if his labrum abnormality is contributing to his pain.  Of note we did this in the past and did not accomplish very much but that was quite sometime ago and prior to his surgery.    EMR Dragon/Transcription disclaimer:    Much of this encounter note is an electronic transcription/translation of spoken language to printed text.  The electronic translation of spoken language may permit erroneous, or at times, nonsensical words or phrases to be inadvertently transcribed.  Although I have reviewed the note for such errors some may still exist.

## 2023-11-09 LAB
APPEARANCE UR: CLEAR
BACTERIA #/AREA URNS HPF: ABNORMAL /HPF
BILIRUB UR QL STRIP: NEGATIVE
BUN SERPL-MCNC: 26 MG/DL (ref 8–23)
BUN/CREAT SERPL: 16.1 (ref 7–25)
CALCIUM SERPL-MCNC: 8.9 MG/DL (ref 8.6–10.5)
CHLORIDE SERPL-SCNC: 104 MMOL/L (ref 98–107)
CO2 SERPL-SCNC: 23.6 MMOL/L (ref 22–29)
COLOR UR: YELLOW
CREAT SERPL-MCNC: 1.61 MG/DL (ref 0.76–1.27)
EGFRCR SERPLBLD CKD-EPI 2021: 44 ML/MIN/1.73
EPI CELLS #/AREA URNS HPF: ABNORMAL /HPF
GLUCOSE SERPL-MCNC: 116 MG/DL (ref 65–99)
GLUCOSE UR QL STRIP: NEGATIVE
HGB UR QL STRIP: NEGATIVE
KETONES UR QL STRIP: NEGATIVE
LEUKOCYTE ESTERASE UR QL STRIP: NEGATIVE
NITRITE UR QL STRIP: NEGATIVE
PH UR STRIP: 5.5 [PH] (ref 5–8)
POTASSIUM SERPL-SCNC: 4.7 MMOL/L (ref 3.5–5.2)
PROT UR QL STRIP: ABNORMAL
RBC #/AREA URNS HPF: ABNORMAL /HPF
SODIUM SERPL-SCNC: 136 MMOL/L (ref 136–145)
SP GR UR STRIP: 1.02 (ref 1–1.03)
UROBILINOGEN UR STRIP-MCNC: ABNORMAL MG/DL
WBC #/AREA URNS HPF: ABNORMAL /HPF

## 2023-11-15 ENCOUNTER — PROCEDURE VISIT (OUTPATIENT)
Dept: SPORTS MEDICINE | Facility: CLINIC | Age: 78
End: 2023-11-15
Payer: MEDICARE

## 2023-11-15 VITALS
HEART RATE: 66 BPM | WEIGHT: 276 LBS | BODY MASS INDEX: 37.38 KG/M2 | HEIGHT: 72 IN | TEMPERATURE: 98 F | DIASTOLIC BLOOD PRESSURE: 74 MMHG | SYSTOLIC BLOOD PRESSURE: 152 MMHG | OXYGEN SATURATION: 96 %

## 2023-11-15 DIAGNOSIS — M25.552 CHRONIC LEFT HIP PAIN: Primary | ICD-10-CM

## 2023-11-15 DIAGNOSIS — G89.29 CHRONIC LEFT HIP PAIN: Primary | ICD-10-CM

## 2023-11-15 NOTE — PROGRESS NOTES
Here today for left hip injection. Did not see relief from aspiration.     - Large Joint Arthrocentesis: L hip joint on 11/15/2023 3:22 PM  Indications: pain and diagnostic evaluation  Details: 22 G needle, ultrasound-guided anterior approach  Medications: 2 mL lidocaine 1 %, 2 mL triamcinolone acetonide 40 MG/ML  Outcome: tolerated well, no immediate complications  Procedure, treatment alternatives, risks and benefits explained, specific risks discussed. Immediately prior to procedure a time out was called to verify the correct patient, procedure, equipment, support staff and site/side marked as required. Patient was prepped and draped in the usual sterile fashion.               Diagnoses and all orders for this visit:    Chronic left hip pain  -     - Large Joint Arthrocentesis

## 2023-11-29 ENCOUNTER — LAB (OUTPATIENT)
Dept: OTHER | Facility: HOSPITAL | Age: 78
End: 2023-11-29
Payer: MEDICARE

## 2023-11-29 ENCOUNTER — OFFICE VISIT (OUTPATIENT)
Dept: ONCOLOGY | Facility: CLINIC | Age: 78
End: 2023-11-29
Payer: MEDICARE

## 2023-11-29 VITALS
RESPIRATION RATE: 16 BRPM | WEIGHT: 275.1 LBS | SYSTOLIC BLOOD PRESSURE: 173 MMHG | OXYGEN SATURATION: 97 % | HEART RATE: 60 BPM | DIASTOLIC BLOOD PRESSURE: 72 MMHG | TEMPERATURE: 96.8 F | HEIGHT: 72 IN | BODY MASS INDEX: 37.26 KG/M2

## 2023-11-29 DIAGNOSIS — D64.9 NORMOCHROMIC NORMOCYTIC ANEMIA: ICD-10-CM

## 2023-11-29 DIAGNOSIS — C91.Z0 LARGE GRANULAR LYMPHOCYTE DISORDER: ICD-10-CM

## 2023-11-29 DIAGNOSIS — C91.Z0 LARGE GRANULAR LYMPHOCYTE DISORDER: Primary | ICD-10-CM

## 2023-11-29 LAB
BASOPHILS # BLD AUTO: 0.02 10*3/MM3 (ref 0–0.2)
BASOPHILS NFR BLD AUTO: 0.2 % (ref 0–1.5)
DEPRECATED RDW RBC AUTO: 46.2 FL (ref 37–54)
EOSINOPHIL # BLD AUTO: 0.09 10*3/MM3 (ref 0–0.4)
EOSINOPHIL NFR BLD AUTO: 0.7 % (ref 0.3–6.2)
ERYTHROCYTE [DISTWIDTH] IN BLOOD BY AUTOMATED COUNT: 13.2 % (ref 12.3–15.4)
FERRITIN SERPL-MCNC: 218.5 NG/ML (ref 30–400)
FOLATE SERPL-MCNC: 14.83 NG/ML (ref 4.78–24.2)
HCT VFR BLD AUTO: 39.9 % (ref 37.5–51)
HGB BLD-MCNC: 12.9 G/DL (ref 13–17.7)
HGB RETIC QN AUTO: 35.9 PG (ref 29.8–36.1)
IMM GRANULOCYTES # BLD AUTO: 0.03 10*3/MM3 (ref 0–0.05)
IMM GRANULOCYTES NFR BLD AUTO: 0.2 % (ref 0–0.5)
IMM RETICS NFR: 6.8 % (ref 3–15.8)
IRON 24H UR-MRATE: 76 MCG/DL (ref 59–158)
IRON SATN MFR SERPL: 20 % (ref 20–50)
LDH SERPL-CCNC: 203 U/L (ref 135–225)
LYMPHOCYTES # BLD AUTO: 3.7 10*3/MM3 (ref 0.7–3.1)
LYMPHOCYTES NFR BLD AUTO: 29.5 % (ref 19.6–45.3)
MCH RBC QN AUTO: 30.9 PG (ref 26.6–33)
MCHC RBC AUTO-ENTMCNC: 32.3 G/DL (ref 31.5–35.7)
MCV RBC AUTO: 95.7 FL (ref 79–97)
MONOCYTES # BLD AUTO: 0.69 10*3/MM3 (ref 0.1–0.9)
MONOCYTES NFR BLD AUTO: 5.5 % (ref 5–12)
NEUTROPHILS NFR BLD AUTO: 63.9 % (ref 42.7–76)
NEUTROPHILS NFR BLD AUTO: 8.01 10*3/MM3 (ref 1.7–7)
NRBC BLD AUTO-RTO: 0 /100 WBC (ref 0–0.2)
PLATELET # BLD AUTO: 337 10*3/MM3 (ref 140–450)
PMV BLD AUTO: 10.4 FL (ref 6–12)
RBC # BLD AUTO: 4.17 10*6/MM3 (ref 4.14–5.8)
RETICS # AUTO: 0.05 10*6/MM3 (ref 0.02–0.13)
RETICS/RBC NFR AUTO: 1.2 % (ref 0.7–1.9)
TIBC SERPL-MCNC: 380 MCG/DL (ref 298–536)
TRANSFERRIN SERPL-MCNC: 255 MG/DL (ref 200–360)
VIT B12 BLD-MCNC: 497 PG/ML (ref 211–946)
WBC NRBC COR # BLD AUTO: 12.54 10*3/MM3 (ref 3.4–10.8)

## 2023-11-29 PROCEDURE — 83540 ASSAY OF IRON: CPT | Performed by: INTERNAL MEDICINE

## 2023-11-29 PROCEDURE — 85046 RETICYTE/HGB CONCENTRATE: CPT | Performed by: INTERNAL MEDICINE

## 2023-11-29 PROCEDURE — 82607 VITAMIN B-12: CPT | Performed by: INTERNAL MEDICINE

## 2023-11-29 PROCEDURE — 83921 ORGANIC ACID SINGLE QUANT: CPT | Performed by: INTERNAL MEDICINE

## 2023-11-29 PROCEDURE — 1126F AMNT PAIN NOTED NONE PRSNT: CPT | Performed by: INTERNAL MEDICINE

## 2023-11-29 PROCEDURE — 3078F DIAST BP <80 MM HG: CPT | Performed by: INTERNAL MEDICINE

## 2023-11-29 PROCEDURE — 36415 COLL VENOUS BLD VENIPUNCTURE: CPT

## 2023-11-29 PROCEDURE — 3077F SYST BP >= 140 MM HG: CPT | Performed by: INTERNAL MEDICINE

## 2023-11-29 PROCEDURE — 1160F RVW MEDS BY RX/DR IN RCRD: CPT | Performed by: INTERNAL MEDICINE

## 2023-11-29 PROCEDURE — 82728 ASSAY OF FERRITIN: CPT | Performed by: INTERNAL MEDICINE

## 2023-11-29 PROCEDURE — 83615 LACTATE (LD) (LDH) ENZYME: CPT | Performed by: INTERNAL MEDICINE

## 2023-11-29 PROCEDURE — 1159F MED LIST DOCD IN RCRD: CPT | Performed by: INTERNAL MEDICINE

## 2023-11-29 PROCEDURE — 99214 OFFICE O/P EST MOD 30 MIN: CPT | Performed by: INTERNAL MEDICINE

## 2023-11-29 PROCEDURE — 85025 COMPLETE CBC W/AUTO DIFF WBC: CPT | Performed by: INTERNAL MEDICINE

## 2023-11-29 PROCEDURE — 84466 ASSAY OF TRANSFERRIN: CPT | Performed by: INTERNAL MEDICINE

## 2023-11-29 PROCEDURE — 82746 ASSAY OF FOLIC ACID SERUM: CPT | Performed by: INTERNAL MEDICINE

## 2023-11-29 NOTE — PROGRESS NOTES
"Subjective     CHIEF COMPLAINT:      Chief Complaint   Patient presents with    Follow-up     No concerns      HISTORY OF PRESENT ILLNESS:     Dileep Hamilton is a 78 y.o. male patient who returns today for follow up on his lymphocytosis and anemia.  He returns today for follow-up reporting that he has been having problem with left trochanteric bursitis.  He had removal of the bursa and symptoms improved.  However, he recently started experiencing pain and was found to have labrum tear.  He continues to follow-up closely with his PCP and with orthopedics.  He reports that he had a steroid injection by Dr. Powell on 11/15/2023 and this helped his pain.    ROS:  Pertinent ROS is in the HPI.     Past medical, surgical, social and family history were reviewed.     MEDICATIONS:    Current Outpatient Medications:     amLODIPine (NORVASC) 5 MG tablet, Take 2 tablets by mouth Daily., Disp: 180 tablet, Rfl: 1    Cholecalciferol (VITAMIN D3) 20 MCG (800 UNIT) tablet, Take 800 Int'l Units by mouth Daily., Disp: 75 tablet, Rfl:     Coenzyme Q10 (COQ-10) 100 MG capsule, Take 1 capsule by mouth Daily., Disp: , Rfl:     Multiple Vitamin (MULTI VITAMIN DAILY PO), Take 1 tablet by mouth Daily., Disp: , Rfl:     olmesartan (BENICAR) 40 MG tablet, Take 1 tablet by mouth once daily, Disp: 90 tablet, Rfl: 0    Omega-3 Fatty Acids (FISH OIL) 1000 MG capsule capsule, Take 1 capsule by mouth Daily With Breakfast., Disp: , Rfl:     torsemide (DEMADEX) 5 MG tablet, Take 1/2 (one-half) tablet by mouth once daily, Disp: 30 tablet, Rfl: 0  Objective     VITAL SIGNS:     Vitals:    11/29/23 1436   BP: 173/72   Pulse: 60   Resp: 16   Temp: 96.8 °F (36 °C)   TempSrc: Temporal   SpO2: 97%   Weight: 125 kg (275 lb 1.6 oz)   Height: 182.9 cm (72.01\")   PainSc: 0-No pain     Body mass index is 37.3 kg/m².     Wt Readings from Last 5 Encounters:   11/29/23 125 kg (275 lb 1.6 oz)   11/15/23 125 kg (276 lb)   09/01/23 123 kg (271 lb)   08/23/23 123 kg " (270 lb 4.8 oz)   05/31/23 126 kg (278 lb 1.6 oz)     PHYSICAL EXAMINATION:   GENERAL: The patient appears in good general condition, not in acute distress.   SKIN: No ecchymosis.  EYES: No jaundice. Pallor.  CHEST: Normal respiratory effort.  Lungs clear bilaterally.  No added sounds.  CVS: Normal S1-S2.  No murmurs.  ABDOMEN: Soft. No tenderness. No Hepatomegaly. No Splenomegaly. No masses.    DIAGNOSTIC DATA:     Results from last 7 days   Lab Units 11/29/23  1424   WBC 10*3/mm3 12.54*   NEUTROS ABS 10*3/mm3 8.01*   HEMOGLOBIN g/dL 12.9*   HEMATOCRIT % 39.9   PLATELETS 10*3/mm3 337         Lab 11/29/23  1424   IRON 76   IRON SATURATION (TSAT) 20   TIBC 380   TRANSFERRIN 255   FERRITIN 218.50   FOLATE 14.83   VITAMIN B 12 497      Component      Latest Ref Rn 11/29/2023   Reticulocyte %      0.70 - 1.90 % 1.20      Component      Latest Ref Rn 11/29/2023   LDH      135 - 225 U/L 203      Component      Latest Ref Rn 5/31/2023 8/16/2023 11/29/2023   Neutrophils Absolute      1.70 - 7.00 10*3/mm3 6.18  4.55  8.01 (H)    Lymphocytes Absolute      0.70 - 3.10 10*3/mm3 4.36 (H)  4.73 (H)  3.70 (H)    Monocytes Absolute      0.10 - 0.90 10*3/mm3 0.70  0.58  0.69    Eosinophils Absolute      0.00 - 0.40 10*3/mm3 0.21  0.20  0.09    Basophils Absolute      0.00 - 0.20 10*3/mm3 0.03  0.03  0.02    Immature Granulocyte Rel %      0.0 - 0.5 % 0.3  0.2  0.2    Immature Grans, Absolute      0.00 - 0.05 10*3/mm3 0.03  0.02  0.03    nRBC      0.0 - 0.2 /100 WBC 0.0  0.1  0.0       Assessment & Plan    *Lymphocytosis.  It dates back to 2015 when lymphocyte count was 4600 on 10/22/2015.  Lymphocyte count was 4540 on 1/27/2023.  Lymphocyte count was 3780 on 3/22/2023.  Peripheral blood flow cytometry on 3/22/2023 CD3 positive, CD8 positive, T cell /large granular lymphocyte population representing 10% of the total events suspicious for a clonal population by T-cell receptor analysis.  5/31/2023: Lymphocyte count 4360.  Based on  the percentage of the lymphocytes, total LGL population is about 1000.  This represented LGL lymphocytosis.  11/29/2023: Lymphocyte count improved to 3700.  Exam revealed no lymphadenopathy or splenomegaly.  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    *Normochromic normocytic anemia.  Hemoglobin was 13.4 on 12/3/2019  Hemoglobin was 13.4 on 3/22/2023.  5/31/2023: Hemoglobin 13.2.  11/29/2023: Hemoglobin decreased to 12.9.  Anemia workup revealed borderline transferrin saturation of 20%.  Vitamin B12 and folate are normal.    PLAN:    1.  Start ferrous sulfate 325 mg every other day.    2.  Will continue to monitor the LGL lymphocytosis.  3.  Follow-up in 6 months.  We will repeat CBC reticulocyte LDH ferritin iron panel vitamin B12 and folate levels.          Ruth Salter MD  11/29/23

## 2023-11-30 ENCOUNTER — TELEPHONE (OUTPATIENT)
Dept: ONCOLOGY | Facility: CLINIC | Age: 78
End: 2023-11-30
Payer: MEDICARE

## 2023-11-30 NOTE — TELEPHONE ENCOUNTER
----- Message from Ruth Salter MD sent at 11/29/2023  5:08 PM EST -----  Please inform the patient that his iron level is borderline low.  I recommend starting ferrous sulfate 325 mg every other day.    Thank you

## 2023-12-01 RX ORDER — FERROUS SULFATE 325(65) MG
325 TABLET ORAL EVERY OTHER DAY
COMMUNITY

## 2023-12-04 LAB — METHYLMALONATE SERPL-SCNC: 485 NMOL/L (ref 0–378)

## 2023-12-06 ENCOUNTER — TELEPHONE (OUTPATIENT)
Dept: ONCOLOGY | Facility: CLINIC | Age: 78
End: 2023-12-06
Payer: MEDICARE

## 2023-12-06 RX ORDER — LANOLIN ALCOHOL/MO/W.PET/CERES
1000 CREAM (GRAM) TOPICAL DAILY
COMMUNITY

## 2023-12-06 NOTE — TELEPHONE ENCOUNTER
----- Message from Ruth Salter MD sent at 12/4/2023 10:49 PM EST -----  Please inform the patient that his labs show vitamin B12 deficiency (based on elevated methylmalonic acid level).     I recommend starting oral vitamin B12 1000 mcg daily.    Thank you

## 2023-12-30 DIAGNOSIS — I10 UNCONTROLLED HYPERTENSION: ICD-10-CM

## 2024-01-02 RX ORDER — TORSEMIDE 5 MG/1
2.5 TABLET ORAL DAILY
Qty: 30 TABLET | Refills: 0 | Status: SHIPPED | OUTPATIENT
Start: 2024-01-02

## 2024-02-28 DIAGNOSIS — I10 UNCONTROLLED HYPERTENSION: ICD-10-CM

## 2024-02-28 DIAGNOSIS — I10 ESSENTIAL HYPERTENSION: ICD-10-CM

## 2024-02-28 RX ORDER — OLMESARTAN MEDOXOMIL 40 MG/1
TABLET ORAL
Qty: 90 TABLET | Refills: 0 | Status: SHIPPED | OUTPATIENT
Start: 2024-02-28

## 2024-02-29 DIAGNOSIS — I10 UNCONTROLLED HYPERTENSION: ICD-10-CM

## 2024-02-29 RX ORDER — TORSEMIDE 5 MG/1
2.5 TABLET ORAL DAILY
Qty: 30 TABLET | Refills: 0 | Status: SHIPPED | OUTPATIENT
Start: 2024-02-29

## 2024-04-26 DIAGNOSIS — I10 UNCONTROLLED HYPERTENSION: ICD-10-CM

## 2024-04-26 RX ORDER — TORSEMIDE 5 MG/1
2.5 TABLET ORAL DAILY
Qty: 30 TABLET | Refills: 0 | Status: SHIPPED | OUTPATIENT
Start: 2024-04-26

## 2024-05-10 DIAGNOSIS — Z00.00 HEALTHCARE MAINTENANCE: Primary | ICD-10-CM

## 2024-05-10 DIAGNOSIS — E11.9 CONTROLLED TYPE 2 DIABETES MELLITUS WITHOUT COMPLICATION, WITHOUT LONG-TERM CURRENT USE OF INSULIN: ICD-10-CM

## 2024-05-10 DIAGNOSIS — I10 ESSENTIAL HYPERTENSION: ICD-10-CM

## 2024-05-10 DIAGNOSIS — E78.2 MIXED HYPERLIPIDEMIA: ICD-10-CM

## 2024-05-11 LAB
ALBUMIN SERPL-MCNC: 4.1 G/DL (ref 3.8–4.8)
ALBUMIN/CREAT UR: 14 MG/G CREAT (ref 0–29)
ALBUMIN/GLOB SERPL: 1.5 {RATIO} (ref 1.2–2.2)
ALP SERPL-CCNC: 96 IU/L (ref 44–121)
ALT SERPL-CCNC: 14 IU/L (ref 0–44)
APPEARANCE UR: CLEAR
AST SERPL-CCNC: 15 IU/L (ref 0–40)
BACTERIA #/AREA URNS HPF: NORMAL /[HPF]
BASOPHILS # BLD AUTO: 0 X10E3/UL (ref 0–0.2)
BASOPHILS NFR BLD AUTO: 0 %
BILIRUB SERPL-MCNC: 0.5 MG/DL (ref 0–1.2)
BILIRUB UR QL STRIP: NEGATIVE
BUN SERPL-MCNC: 38 MG/DL (ref 8–27)
BUN/CREAT SERPL: 23 (ref 10–24)
CALCIUM SERPL-MCNC: 9.4 MG/DL (ref 8.6–10.2)
CASTS URNS QL MICRO: NORMAL /LPF
CHLORIDE SERPL-SCNC: 103 MMOL/L (ref 96–106)
CHOLEST SERPL-MCNC: 188 MG/DL (ref 100–199)
CO2 SERPL-SCNC: 22 MMOL/L (ref 20–29)
COLOR UR: YELLOW
CREAT SERPL-MCNC: 1.63 MG/DL (ref 0.76–1.27)
CREAT UR-MCNC: 78.1 MG/DL
EGFRCR SERPLBLD CKD-EPI 2021: 43 ML/MIN/1.73
EOSINOPHIL # BLD AUTO: 0.2 X10E3/UL (ref 0–0.4)
EOSINOPHIL NFR BLD AUTO: 2 %
EPI CELLS #/AREA URNS HPF: NORMAL /HPF (ref 0–10)
ERYTHROCYTE [DISTWIDTH] IN BLOOD BY AUTOMATED COUNT: 13.1 % (ref 11.6–15.4)
GLOBULIN SER CALC-MCNC: 2.7 G/DL (ref 1.5–4.5)
GLUCOSE SERPL-MCNC: 108 MG/DL (ref 70–99)
GLUCOSE UR QL STRIP: NEGATIVE
HBA1C MFR BLD: 6.1 % (ref 4.8–5.6)
HCT VFR BLD AUTO: 38.6 % (ref 37.5–51)
HDLC SERPL-MCNC: 46 MG/DL
HGB BLD-MCNC: 13 G/DL (ref 13–17.7)
HGB UR QL STRIP: NEGATIVE
IMM GRANULOCYTES # BLD AUTO: 0 X10E3/UL (ref 0–0.1)
IMM GRANULOCYTES NFR BLD AUTO: 0 %
KETONES UR QL STRIP: NEGATIVE
LDLC SERPL CALC-MCNC: 126 MG/DL (ref 0–99)
LDLC/HDLC SERPL: 2.7 RATIO (ref 0–3.6)
LEUKOCYTE ESTERASE UR QL STRIP: NEGATIVE
LYMPHOCYTES # BLD AUTO: 3.3 X10E3/UL (ref 0.7–3.1)
LYMPHOCYTES NFR BLD AUTO: 33 %
MCH RBC QN AUTO: 30.8 PG (ref 26.6–33)
MCHC RBC AUTO-ENTMCNC: 33.7 G/DL (ref 31.5–35.7)
MCV RBC AUTO: 92 FL (ref 79–97)
MICRO URNS: NORMAL
MICRO URNS: NORMAL
MICROALBUMIN UR-MCNC: 10.6 UG/ML
MONOCYTES # BLD AUTO: 0.6 X10E3/UL (ref 0.1–0.9)
MONOCYTES NFR BLD AUTO: 6 %
NEUTROPHILS # BLD AUTO: 5.9 X10E3/UL (ref 1.4–7)
NEUTROPHILS NFR BLD AUTO: 59 %
NITRITE UR QL STRIP: NEGATIVE
PH UR STRIP: 6.5 [PH] (ref 5–7.5)
PLATELET # BLD AUTO: 321 X10E3/UL (ref 150–450)
POTASSIUM SERPL-SCNC: 5.2 MMOL/L (ref 3.5–5.2)
PROT SERPL-MCNC: 6.8 G/DL (ref 6–8.5)
PROT UR QL STRIP: NEGATIVE
RBC # BLD AUTO: 4.22 X10E6/UL (ref 4.14–5.8)
RBC #/AREA URNS HPF: NORMAL /HPF (ref 0–2)
SODIUM SERPL-SCNC: 139 MMOL/L (ref 134–144)
SP GR UR STRIP: 1.02 (ref 1–1.03)
TRIGL SERPL-MCNC: 86 MG/DL (ref 0–149)
URINALYSIS REFLEX: NORMAL
UROBILINOGEN UR STRIP-MCNC: 0.2 MG/DL (ref 0.2–1)
VLDLC SERPL CALC-MCNC: 16 MG/DL (ref 5–40)
WBC # BLD AUTO: 10 X10E3/UL (ref 3.4–10.8)
WBC #/AREA URNS HPF: NORMAL /HPF (ref 0–5)

## 2024-05-17 ENCOUNTER — OFFICE VISIT (OUTPATIENT)
Dept: INTERNAL MEDICINE | Facility: CLINIC | Age: 79
End: 2024-05-17
Payer: MEDICARE

## 2024-05-17 VITALS
OXYGEN SATURATION: 97 % | HEART RATE: 57 BPM | BODY MASS INDEX: 36.65 KG/M2 | TEMPERATURE: 98.3 F | SYSTOLIC BLOOD PRESSURE: 150 MMHG | HEIGHT: 72 IN | WEIGHT: 270.6 LBS | DIASTOLIC BLOOD PRESSURE: 68 MMHG

## 2024-05-17 DIAGNOSIS — C18.9 MALIGNANT NEOPLASM OF COLON, UNSPECIFIED PART OF COLON: ICD-10-CM

## 2024-05-17 DIAGNOSIS — Z00.00 ENCOUNTER FOR SUBSEQUENT ANNUAL WELLNESS VISIT (AWV) IN MEDICARE PATIENT: ICD-10-CM

## 2024-05-17 DIAGNOSIS — E55.9 VITAMIN D DEFICIENCY: ICD-10-CM

## 2024-05-17 DIAGNOSIS — E78.2 MIXED HYPERLIPIDEMIA: ICD-10-CM

## 2024-05-17 DIAGNOSIS — C44.92 SQUAMOUS CELL CARCINOMA OF SKIN: ICD-10-CM

## 2024-05-17 DIAGNOSIS — H35.373 MACULAR PUCKER OF BOTH EYES: ICD-10-CM

## 2024-05-17 DIAGNOSIS — E66.01 MORBID OBESITY DUE TO EXCESS CALORIES: ICD-10-CM

## 2024-05-17 DIAGNOSIS — I35.9 AORTIC VALVE CALCIFICATION: ICD-10-CM

## 2024-05-17 DIAGNOSIS — R29.818 SUSPECTED SLEEP APNEA: ICD-10-CM

## 2024-05-17 DIAGNOSIS — I87.2 VENOUS STASIS DERMATITIS: ICD-10-CM

## 2024-05-17 DIAGNOSIS — E11.9 CONTROLLED TYPE 2 DIABETES MELLITUS WITHOUT COMPLICATION, WITHOUT LONG-TERM CURRENT USE OF INSULIN: ICD-10-CM

## 2024-05-17 DIAGNOSIS — Z00.01 ENCOUNTER FOR GENERAL ADULT MEDICAL EXAMINATION WITH ABNORMAL FINDINGS: Primary | ICD-10-CM

## 2024-05-17 DIAGNOSIS — M16.0 PRIMARY OSTEOARTHRITIS OF BOTH HIPS: ICD-10-CM

## 2024-05-17 DIAGNOSIS — D72.820 LYMPHOCYTOSIS: ICD-10-CM

## 2024-05-17 DIAGNOSIS — I10 ESSENTIAL HYPERTENSION: ICD-10-CM

## 2024-05-17 DIAGNOSIS — N40.0 BENIGN PROSTATIC HYPERPLASIA WITHOUT LOWER URINARY TRACT SYMPTOMS: ICD-10-CM

## 2024-05-17 DIAGNOSIS — R01.1 MURMUR, CARDIAC: ICD-10-CM

## 2024-05-17 PROBLEM — M70.62 TROCHANTERIC BURSITIS OF LEFT HIP: Status: RESOLVED | Noted: 2023-03-24 | Resolved: 2024-05-17

## 2024-05-17 NOTE — PATIENT INSTRUCTIONS
Medicare Wellness  Personal Prevention Plan of Service     Date of Office Visit:    Encounter Provider:  Juan A Rubi MD  Place of Service:  [facility]  Patient Name: Dileep Hamilton  :  1945    As part of the Medicare Wellness portion of your visit today, we are providing you with this personalized preventive plan of services (PPPS). This plan is based upon recommendations of the [place] Preventive Services Task Force (USPSTF) and the Advisory Committee on Immunization Practices (ACIP).    This lists the preventive care services that should be considered, and provides dates of when you are due. Items listed as completed are up-to-date and do not require any further intervention.    Health Maintenance   Topic Date Due    RSV Vaccine - Adults (1 - 1-dose 60+ series) Never done    COVID-19 Vaccine ( - - season) 2023    INFLUENZA VACCINE  2024    TDAP/TD VACCINES (2 - Tdap) 2024    BMI FOLLOWUP  2024    HEMOGLOBIN A1C  11/10/2024    DIABETIC EYE EXAM  04/10/2025    LIPID PANEL  05/10/2025    URINE MICROALBUMIN  05/10/2025    ANNUAL WELLNESS VISIT  2025    COLONOSCOPY  2028    HEPATITIS C SCREENING  Completed    Pneumococcal Vaccine 65+  Completed    ZOSTER VACCINE  Addressed       No orders of the defined types were placed in this encounter.      Return in about 6 months (around 2024) for Recheck.

## 2024-05-17 NOTE — PROGRESS NOTES
Subjective   Dileep Hamilton is a 78 y.o. male who presents for a Medicare Well Visit    Patient Care Team:  Juan A Rubi MD as PCP - General  Juan A Rubi MD as PCP - Family Medicine  Crys Colón MD as Consulting Physician (Ophthalmology)  Ruth Salter MD as Consulting Physician (Hematology and Oncology)  Juan A Rubi MD as Referring Physician (Internal Medicine)    Recent Hospitalizations: No recent hospitalization(s).    Depression Screen:       2024    10:16 AM   PHQ-2/PHQ-9 Depression Screening   Little Interest or Pleasure in Doing Things 0-->not at all   Feeling Down, Depressed or Hopeless 0-->not at all   PHQ-9: Brief Depression Severity Measure Score 0       Functional and Cognitive Screenin/17/2024    10:16 AM   Functional & Cognitive Status   Do you have difficulty preparing food and eating? No   Do you have difficulty bathing yourself, getting dressed or grooming yourself? No   Do you have difficulty using the toilet? No   Do you have difficulty moving around from place to place? No   Do you have trouble with steps or getting out of a bed or a chair? No   Current Diet Well Balanced Diet   Dental Exam Up to date   Eye Exam Up to date   Exercise (times per week) 3 times per week   Current Exercises Include Home Fitness Gym;Light Weights;Stationary Bicycling/Spin Class;Walking;Weightlifting;Yard Work   Do you need help using the phone?  No   Are you deaf or do you have serious difficulty hearing?  No   Do you need help to go to places out of walking distance? No   Do you need help preparing meals?  No   Do you need help with housework?  No   Do you need help with laundry? No   Do you need help taking your medications? No   Do you need help managing money? No   Do you ever drive or ride in a car without wearing a seat belt? No   Have you felt unusual stress, anger or loneliness in the last month? No   Who do you live with? Spouse   If you need help, do you have trouble  "finding someone available to you? No   Have you been bothered in the last four weeks by sexual problems? No   Do you have difficulty concentrating, remembering or making decisions? No       Does the patient have evidence of cognitive impairment? no    No opioid medication identified on active medication list. I have reviewed chart for other potential  high risk medication/s and harmful drug interactions in the elderly.          Does not need ASA (and currently is not on it)    Vitals:    05/17/24 1017   BP: 150/68   BP Location: Left arm   Patient Position: Sitting   Cuff Size: Adult   Pulse: 57   Temp: 98.3 °F (36.8 °C)   TempSrc: Infrared   SpO2: 97%   Weight: 123 kg (270 lb 9.6 oz)   Height: 182.9 cm (72.01\")       Body mass index is 36.69 kg/m².    Visual Acuity:  No results found.    Advanced Care Planning:  ACP discussion was held with the patient during this visit. Patient has an advance directive (not in EMR), copy requested.    Compared to one year ago, the patient feels physical health is the same.  Compared to one year ago, the patient feels mental health is the same.    Reviewed chart for potential of high risk medication in the elderly: yes  Reviewed chart for potential of harmful drug interactions in the elderly:yes    Identification of Risk Factors:  Risk factors include: Advance Directive Discussion  Cardiovascular risk  Colon Cancer Screening  Diabetic Lab Screening   Glaucoma Risk  Immunizations Discussed/Encouraged (specific immunizations; Tdap, Hepatitis A Vaccine/Series, Influenza, Pneumococcal 23, Shingrix, COVID19, and RSV (Respiratory Syncytial Virus) )  Inactivity/Sedentary  Obesity/Overweight   Prostate Cancer Screening .    Patient Self-Management and Personalized Health Advice  The patient has been provided with information about: diet, exercise, and weight management and preventive services including:   Annual Wellness Visit (AWV)  Colorectal Cancer Screening, Colonoscopy  Prostate " Cancer Screening .  Follow Up: Medicare visit in one year    Discussed the patient's BMI with him. The BMI is above average; BMI management plan is completed.    Patient has multiple medical problems which are significant and separately identifiable that require additional work above and beyond the Medicare Wellness Visit. These are not trivial or insignificant and are billed with a 25-modifier    Chief Complaint   Patient presents with    Medicare Wellness-subsequent     Review of medical issues.       HPI:  Dileep Hamilton is a 78 y.o. male RTC in yearly AWV, review of medical issues:   1. Severe L lateral hip pain, Mild arthritic changes of both hips without hip fracture and Chronic bilateral hamstring origin tendinopathy - s/p LEFT HIP OPEN TROCHANTERIC BURSECTOMY, ILIOTIBIAL BAND TENOTOMY 4/19/23 with Dr. Yañez with resolved pre-op pain. However, recurred pain in 11/2023 and had repeat MRI with labral tear. Saw Dr. Casey in sports med and is managing with injx as needed. Has some limitation with exercise due to issue, but is off golf due reading online that should not play golf. Still active working on course.   2. HTN, controlled - gertting good numbers on home log, usually in 130's at home. Is on 3 drugs for pressure, all taken today.   3. Suspected AIDEE - saw sleep med in 2022, sleep study deferred as pt decliend CPAP use if positive and declined snoring hx. Feels like sleeping well.  No snoring noted.   4. Lymphocytosis - S/p hematology eval, ?? Developing LGL leukemia. Stable numbers and no additional eval for now per Heme visit.   5. DMII with long hx obesity -  A1C stable, remains diet controlled. Weight loss and AIDEE tx advised  6. HLD - intolerant to Pravastatin with CoQ10 and Livalo was expensive, now declines all statin meds.   7. Colon CA, cecum - Stage I, s/p R colon resection in 12/5/2014.  C-scope clear 3/2018 with Dr. Sin --> repeat in 5 years,due.  Patient never made appointment and notes  that his prior MD retired.  Agrees to call and schedule.  Declines formal referral from me today.  8. Stasis dermatitis, mild - compression socks used at times, but not daily.  No pain noted with issue.      Review of Systems   Constitutional: Negative for chills, fever, malaise/fatigue, weight gain and weight loss.   HENT:  Negative for congestion, hearing loss, odynophagia and sore throat.    Eyes:  Negative for discharge, double vision, pain and redness.        Last eye exam 4/ 2024 at [facility]; 'they said fine'.      Cardiovascular:  Positive for leg swelling (Baseline, with noted venous insufficiency.). Negative for chest pain, dyspnea on exertion, irregular heartbeat, near-syncope, palpitations and syncope.   Respiratory:  Negative for cough, hemoptysis, shortness of breath, sputum production and wheezing.    Endocrine: Negative for polydipsia, polyphagia and polyuria.   Hematologic/Lymphatic: Negative for bleeding problem. Does not bruise/bleed easily.   Skin:  Negative for rash and suspicious lesions.   Musculoskeletal:  Positive for arthritis (B hips; minimal on hand at thumb) and joint pain. Negative for joint swelling, muscle cramps, muscle weakness and myalgias.   Gastrointestinal:  Positive for constipation (recedtn, not typical for pt. Used Dulcolax.), hematochezia (with constipation event.  Resovled now tih bowels resolved.) and hemorrhoids. Negative for change in bowel habit, nausea and vomiting.   Genitourinary:  Negative for bladder incontinence, dysuria, frequency, hematuria, hesitancy, incomplete emptying and nocturia.   Neurological:  Negative for dizziness, headaches and light-headedness.   Psychiatric/Behavioral:  Negative for depression. The patient does not have insomnia and is not nervous/anxious.    Allergic/Immunologic: Negative for environmental allergies and persistent infections.     @OBJECTIVE BEGIN@  Vitals:    05/17/24 1017   BP: 150/68   Pulse: 57   Temp: 98.3 °F (36.8 °C)    SpO2: 97%     Physical Exam  Vitals reviewed.   Constitutional:       General: He is not in acute distress.     Appearance: Normal appearance. He is well-developed. He is obese. He is not ill-appearing or toxic-appearing.   HENT:      Head: Normocephalic and atraumatic.      Right Ear: Hearing, tympanic membrane, ear canal and external ear normal. There is no impacted cerumen.      Left Ear: Hearing, tympanic membrane, ear canal and external ear normal. There is no impacted cerumen.      Nose: Nose normal.      Mouth/Throat:      Mouth: Mucous membranes are moist. No oral lesions.      Tongue: No lesions.      Pharynx: Oropharynx is clear. Uvula midline. No pharyngeal swelling, oropharyngeal exudate, posterior oropharyngeal erythema or uvula swelling.   Eyes:      General: Lids are normal. No scleral icterus.        Right eye: No discharge.         Left eye: No discharge.      Extraocular Movements: Extraocular movements intact.      Conjunctiva/sclera: Conjunctivae normal.      Pupils: Pupils are equal, round, and reactive to light.   Neck:      Thyroid: No thyroid mass or thyromegaly.      Vascular: No carotid bruit.   Cardiovascular:      Rate and Rhythm: Normal rate and regular rhythm.      Pulses:           Carotid pulses are 2+ on the right side and 2+ on the left side.       Radial pulses are 2+ on the right side and 2+ on the left side.        Dorsalis pedis pulses are 2+ on the right side and 2+ on the left side.        Posterior tibial pulses are 2+ on the right side and 2+ on the left side.      Heart sounds: S1 normal and S2 normal. Murmur (Radiates to carotids) heard.      Systolic murmur is present with a grade of 3/6.      No friction rub. No gallop.      Comments: Diffuse spider veins noted BLE  Pulmonary:      Effort: Pulmonary effort is normal. No respiratory distress.      Breath sounds: Normal breath sounds. No wheezing, rhonchi or rales.   Abdominal:      General: Abdomen is protuberant. Bowel  sounds are normal. There is no distension.      Palpations: Abdomen is soft. There is no mass.      Tenderness: There is no abdominal tenderness. There is no guarding or rebound.   Genitourinary:     Prostate: Not enlarged, not tender and no nodules present.      Rectum: External hemorrhoid present. No tenderness. Normal anal tone.   Musculoskeletal:         General: No deformity. Normal range of motion.      Right shoulder: No tenderness, bony tenderness or crepitus. Normal range of motion.      Left shoulder: No tenderness, bony tenderness or crepitus. Normal range of motion.      Right hand: No tenderness or bony tenderness. Normal range of motion. Normal strength.      Left hand: No tenderness or bony tenderness. Normal range of motion. Normal strength.      Cervical back: Full passive range of motion without pain, normal range of motion and neck supple.      Right lower le+ Edema (Minimal pitting, doughy) present.      Left lower le+ Edema (Minimal pitting, doughy) present.      Right foot: Normal range of motion.      Left foot: Normal range of motion.   Lymphadenopathy:      Cervical: No cervical adenopathy.      Right cervical: No superficial, deep or posterior cervical adenopathy.     Left cervical: No superficial, deep or posterior cervical adenopathy.      Upper Body:      Right upper body: No supraclavicular, axillary or pectoral adenopathy.      Left upper body: No supraclavicular, axillary or pectoral adenopathy.   Skin:     General: Skin is warm and dry.      Findings: No rash.          Neurological:      Mental Status: He is alert and oriented to person, place, and time.      Cranial Nerves: No cranial nerve deficit, dysarthria or facial asymmetry.      Sensory: No sensory deficit.      Motor: No weakness, tremor, atrophy or abnormal muscle tone.      Gait: Gait normal.      Deep Tendon Reflexes: Reflexes are normal and symmetric.      Reflex Scores:       Patellar reflexes are 2+ on the  right side and 2+ on the left side.       Achilles reflexes are 2+ on the right side and 2+ on the left side.  Psychiatric:         Attention and Perception: Attention normal.         Mood and Affect: Mood normal.         Speech: Speech normal.         Behavior: Behavior normal. Behavior is cooperative.         Thought Content: Thought content normal.         Assessment & Plan   Diagnoses and all orders for this visit:    1. Encounter for general adult medical examination with abnormal findings (Primary)    2. Encounter for subsequent annual wellness visit (AWV) in Medicare patient    3. Essential hypertension    4. Mixed hyperlipidemia  -     Pitavastatin Magnesium 1 MG tablet; Take 1 mg by mouth Daily.  Dispense: 90 tablet; Refill: 2    5. Vitamin D deficiency    6. Venous stasis dermatitis    7. Suspected sleep apnea    8. Squamous cell carcinoma of skin    9. Primary osteoarthritis of both hips    10. Murmur, cardiac    11. Morbid obesity due to excess calories    12. Malignant neoplasm of colon, unspecified part of colon    13. Macular pucker of both eyes    14. Lymphocytosis    15. Aortic valve calcification    16. Benign prostatic hyperplasia without lower urinary tract symptoms    17. Controlled type 2 diabetes mellitus without complication, without long-term current use of insulin  -     Pitavastatin Magnesium 1 MG tablet; Take 1 mg by mouth Daily.  Dispense: 90 tablet; Refill: 2          Diagnoses and all orders for this visit:    Encounter for general adult medical examination with abnormal findings    Encounter for subsequent annual wellness visit (AWV) in Medicare patient    Essential hypertension    Mixed hyperlipidemia  -     Pitavastatin Magnesium 1 MG tablet; Take 1 mg by mouth Daily.    Vitamin D deficiency    Venous stasis dermatitis    Suspected sleep apnea    Squamous cell carcinoma of skin    Primary osteoarthritis of both hips    Murmur, cardiac    Morbid obesity due to excess  calories    Malignant neoplasm of colon, unspecified part of colon    Macular pucker of both eyes    Lymphocytosis    Aortic valve calcification    Benign prostatic hyperplasia without lower urinary tract symptoms    Controlled type 2 diabetes mellitus without complication, without long-term current use of insulin  -     Pitavastatin Magnesium 1 MG tablet; Take 1 mg by mouth Daily.      Dileep Hamilton is a 78 y.o. male RTC in yearly AWV, review of medical issues:   1. Severe L lateral hip pain, Mild arthritic changes of both hips without hip fracture and Chronic bilateral hamstring origin tendinopathy - s/p LEFT HIP OPEN TROCHANTERIC BURSECTOMY, ILIOTIBIAL BAND TENOTOMY 4/19/23 with Dr. Yañez with additional resolved pre-op pain. Recurrent pain 11/2023 with new dx labral tear on MRI and s/p injection with sports medicine, Dr. Powell.  Improved pain overall, F/U sports medicine as needed.  Advised to inquire with sports medicine about ability to golf with known labral tear/not rely on Internet research information.  2. Essential HTN, Renal duplex with normal RA B after past CR increase on thiazide diuretic trial-controlled on home log on 3 medications.  Blood pressure controlled on home log. BMP OK.  3. Suspected AIDEE - saw sleep med in 2022, sleep study deferred as pt decliend CPAP use if positive and declined snoring hx. Pt continues to decline today, despite counseling.   4. Lymphocytosis - S/p hematology eval, ?? Developing LGL leukemia. Stable numbers overall, no progression of lymphocytosis on CBC.  F/U hematology as planned.  5. DMII with long hx obesity - remains diet controlled,  A1C stable. Weight loss and AIDEE tx advised. Optho UTD 4/2024; Umicroalb/Cr (-) on ARB.  6. HLD - intolerant to Pravastatin with CoQ10 and Livalo expense in past.  Readdress statin issue with patient today and agrees to trial of pitavastatin generic low-dose and assessing tolerance.  Call if issues.  Trend lipids next labs.  7. Colon  CA, cecum - Stage I, s/p R colon resection in 12/5/2014.  C-scope clear 3/2018 with Dr. Sin --> repeat in 5 years, overdue.  Reviewed with patient again today and he declines formal referral.  Agrees to call and make appointment.    8. Aortic calcification without stenosis - noted on ECHO 2016 with audible RUSB murmur on exam.  Asx'ic, hold on repeat ECHO unless sx'ic but low threshold for ECHO with any SOA/ OSORIO.  9. Stasis dermatitis, mild - compression socks daily advised.   10. SCC of skin -sees Derm annually.  11. Vitamin D deficiency - on daily 800 I.U. Vitamin D3. Trend next labs.   12. HM - labs d/w pt; Flu/ Td/ Pvax/ Prevnar/ Shingrix/ COVID - UTD; COVID update/ RSV/ Tdap due, complete at pharmacy; C-scope 3/2018 (-) --> 5 years, pt to schedule;  ONEL OK, PSA deferred due to age; Hep C Ab (-) 2014; c/w exercise; Preventative care plan provided to pt at end of visit    Return in about 6 months (around 11/17/2024) for Recheck. (CMP, A1C, Vit D, FLP, CPK)          Current Outpatient Medications:     amLODIPine (NORVASC) 5 MG tablet, Take 2 tablets by mouth Daily., Disp: 180 tablet, Rfl: 1    Cholecalciferol (VITAMIN D3) 20 MCG (800 UNIT) tablet, Take 800 Int'l Units by mouth Daily., Disp: 75 tablet, Rfl:     Coenzyme Q10 (COQ-10) 100 MG capsule, Take 1 capsule by mouth Daily., Disp: , Rfl:     ferrous sulfate 325 (65 FE) MG tablet, Take 1 tablet by mouth Every Other Day., Disp: , Rfl:     Multiple Vitamin (MULTI VITAMIN DAILY PO), Take 1 tablet by mouth Daily., Disp: , Rfl:     olmesartan (BENICAR) 40 MG tablet, Take 1 tablet by mouth once daily, Disp: 90 tablet, Rfl: 0    Omega-3 Fatty Acids (FISH OIL) 1000 MG capsule capsule, Take 1 capsule by mouth Daily With Breakfast., Disp: , Rfl:     torsemide (DEMADEX) 5 MG tablet, Take 1/2 (one-half) tablet by mouth once daily, Disp: 30 tablet, Rfl: 0    vitamin B-12 (CYANOCOBALAMIN) 1000 MCG tablet, Take 1 tablet by mouth Daily., Disp: , Rfl:     Pitavastatin  Magnesium 1 MG tablet, Take 1 mg by mouth Daily., Disp: 90 tablet, Rfl: 2

## 2024-05-26 DIAGNOSIS — I10 UNCONTROLLED HYPERTENSION: ICD-10-CM

## 2024-05-26 DIAGNOSIS — I10 ESSENTIAL HYPERTENSION: ICD-10-CM

## 2024-05-28 RX ORDER — OLMESARTAN MEDOXOMIL 40 MG/1
TABLET ORAL
Qty: 90 TABLET | Refills: 0 | Status: SHIPPED | OUTPATIENT
Start: 2024-05-28

## 2024-05-29 ENCOUNTER — OFFICE VISIT (OUTPATIENT)
Dept: ONCOLOGY | Facility: CLINIC | Age: 79
End: 2024-05-29
Payer: MEDICARE

## 2024-05-29 ENCOUNTER — LAB (OUTPATIENT)
Dept: LAB | Facility: HOSPITAL | Age: 79
End: 2024-05-29
Payer: MEDICARE

## 2024-05-29 VITALS
RESPIRATION RATE: 16 BRPM | BODY MASS INDEX: 36.52 KG/M2 | HEART RATE: 55 BPM | TEMPERATURE: 98.1 F | OXYGEN SATURATION: 98 % | HEIGHT: 72 IN | SYSTOLIC BLOOD PRESSURE: 148 MMHG | DIASTOLIC BLOOD PRESSURE: 68 MMHG | WEIGHT: 269.6 LBS

## 2024-05-29 DIAGNOSIS — C91.Z0 LARGE GRANULAR LYMPHOCYTE DISORDER: Primary | ICD-10-CM

## 2024-05-29 DIAGNOSIS — D50.9 IRON DEFICIENCY ANEMIA, UNSPECIFIED IRON DEFICIENCY ANEMIA TYPE: ICD-10-CM

## 2024-05-29 DIAGNOSIS — D51.3 OTHER DIETARY VITAMIN B12 DEFICIENCY ANEMIA: ICD-10-CM

## 2024-05-29 DIAGNOSIS — C91.Z0 LARGE GRANULAR LYMPHOCYTE DISORDER: ICD-10-CM

## 2024-05-29 DIAGNOSIS — D64.9 NORMOCHROMIC NORMOCYTIC ANEMIA: ICD-10-CM

## 2024-05-29 LAB
BASOPHILS # BLD AUTO: 0.03 10*3/MM3 (ref 0–0.2)
BASOPHILS NFR BLD AUTO: 0.2 % (ref 0–1.5)
DEPRECATED RDW RBC AUTO: 46.3 FL (ref 37–54)
EOSINOPHIL # BLD AUTO: 0.14 10*3/MM3 (ref 0–0.4)
EOSINOPHIL NFR BLD AUTO: 1.1 % (ref 0.3–6.2)
ERYTHROCYTE [DISTWIDTH] IN BLOOD BY AUTOMATED COUNT: 13 % (ref 12.3–15.4)
FERRITIN SERPL-MCNC: 146 NG/ML (ref 30–400)
FOLATE SERPL-MCNC: 11.1 NG/ML (ref 4.78–24.2)
HCT VFR BLD AUTO: 41.8 % (ref 37.5–51)
HGB BLD-MCNC: 13.5 G/DL (ref 13–17.7)
HGB RETIC QN AUTO: 34.4 PG (ref 29.8–36.1)
IMM GRANULOCYTES # BLD AUTO: 0.04 10*3/MM3 (ref 0–0.05)
IMM GRANULOCYTES NFR BLD AUTO: 0.3 % (ref 0–0.5)
IMM RETICS NFR: 8.1 % (ref 3–15.8)
IRON 24H UR-MRATE: 85 MCG/DL (ref 59–158)
IRON SATN MFR SERPL: 24 % (ref 20–50)
LYMPHOCYTES # BLD AUTO: 4.21 10*3/MM3 (ref 0.7–3.1)
LYMPHOCYTES NFR BLD AUTO: 34.2 % (ref 19.6–45.3)
MCH RBC QN AUTO: 30.8 PG (ref 26.6–33)
MCHC RBC AUTO-ENTMCNC: 32.3 G/DL (ref 31.5–35.7)
MCV RBC AUTO: 95.2 FL (ref 79–97)
MONOCYTES # BLD AUTO: 0.58 10*3/MM3 (ref 0.1–0.9)
MONOCYTES NFR BLD AUTO: 4.7 % (ref 5–12)
NEUTROPHILS NFR BLD AUTO: 59.5 % (ref 42.7–76)
NEUTROPHILS NFR BLD AUTO: 7.31 10*3/MM3 (ref 1.7–7)
NRBC BLD AUTO-RTO: 0 /100 WBC (ref 0–0.2)
PLATELET # BLD AUTO: 323 10*3/MM3 (ref 140–450)
PMV BLD AUTO: 9.9 FL (ref 6–12)
RBC # BLD AUTO: 4.39 10*6/MM3 (ref 4.14–5.8)
RETICS # AUTO: 0.05 10*6/MM3 (ref 0.02–0.13)
RETICS/RBC NFR AUTO: 1.17 % (ref 0.7–1.9)
TIBC SERPL-MCNC: 356 MCG/DL (ref 298–536)
TRANSFERRIN SERPL-MCNC: 239 MG/DL (ref 200–360)
VIT B12 BLD-MCNC: 1111 PG/ML (ref 211–946)
WBC NRBC COR # BLD AUTO: 12.31 10*3/MM3 (ref 3.4–10.8)

## 2024-05-29 PROCEDURE — 1160F RVW MEDS BY RX/DR IN RCRD: CPT | Performed by: INTERNAL MEDICINE

## 2024-05-29 PROCEDURE — 3077F SYST BP >= 140 MM HG: CPT | Performed by: INTERNAL MEDICINE

## 2024-05-29 PROCEDURE — 3078F DIAST BP <80 MM HG: CPT | Performed by: INTERNAL MEDICINE

## 2024-05-29 PROCEDURE — 36415 COLL VENOUS BLD VENIPUNCTURE: CPT

## 2024-05-29 PROCEDURE — 82607 VITAMIN B-12: CPT | Performed by: INTERNAL MEDICINE

## 2024-05-29 PROCEDURE — 84466 ASSAY OF TRANSFERRIN: CPT

## 2024-05-29 PROCEDURE — 82728 ASSAY OF FERRITIN: CPT

## 2024-05-29 PROCEDURE — 85025 COMPLETE CBC W/AUTO DIFF WBC: CPT

## 2024-05-29 PROCEDURE — 83540 ASSAY OF IRON: CPT

## 2024-05-29 PROCEDURE — 85046 RETICYTE/HGB CONCENTRATE: CPT

## 2024-05-29 PROCEDURE — 82746 ASSAY OF FOLIC ACID SERUM: CPT | Performed by: INTERNAL MEDICINE

## 2024-05-29 PROCEDURE — 1159F MED LIST DOCD IN RCRD: CPT | Performed by: INTERNAL MEDICINE

## 2024-05-29 PROCEDURE — 1126F AMNT PAIN NOTED NONE PRSNT: CPT | Performed by: INTERNAL MEDICINE

## 2024-05-29 PROCEDURE — 99214 OFFICE O/P EST MOD 30 MIN: CPT | Performed by: INTERNAL MEDICINE

## 2024-05-29 NOTE — PROGRESS NOTES
Subjective     CHIEF COMPLAINT:      Chief Complaint   Patient presents with    Follow-up     HISTORY OF PRESENT ILLNESS:     Dileep Hamilton is a 78 y.o. male patient who returns today for follow up on his lymphocytosis and anemia.  He is on oral iron every other day and vitamin B12 daily.  Since the start of the supplements, he noticed some improvement in his energy level.  He has pain in both knees.  He was diagnosed with complete labrum tear on the left but surgery was not recommended.  This is being managed conservatively.    Patient did not have any recent infections.  He did not notice lymph node enlargement.    ROS:  Pertinent ROS is in the HPI.     Past medical, surgical, social and family history were reviewed.     MEDICATIONS:    Current Outpatient Medications:     amLODIPine (NORVASC) 5 MG tablet, Take 2 tablets by mouth Daily., Disp: 180 tablet, Rfl: 1    Cholecalciferol (VITAMIN D3) 20 MCG (800 UNIT) tablet, Take 800 Int'l Units by mouth Daily., Disp: 75 tablet, Rfl:     Coenzyme Q10 (COQ-10) 100 MG capsule, Take 1 capsule by mouth Daily., Disp: , Rfl:     ferrous sulfate 325 (65 FE) MG tablet, Take 1 tablet by mouth Every Other Day., Disp: , Rfl:     Multiple Vitamin (MULTI VITAMIN DAILY PO), Take 1 tablet by mouth Daily., Disp: , Rfl:     olmesartan (BENICAR) 40 MG tablet, Take 1 tablet by mouth once daily, Disp: 90 tablet, Rfl: 0    Omega-3 Fatty Acids (FISH OIL) 1000 MG capsule capsule, Take 1 capsule by mouth Daily With Breakfast., Disp: , Rfl:     Pitavastatin Magnesium 1 MG tablet, Take 1 mg by mouth Daily., Disp: 90 tablet, Rfl: 2    torsemide (DEMADEX) 5 MG tablet, Take 1/2 (one-half) tablet by mouth once daily, Disp: 30 tablet, Rfl: 0    vitamin B-12 (CYANOCOBALAMIN) 1000 MCG tablet, Take 1 tablet by mouth Daily., Disp: , Rfl:     Objective     VITAL SIGNS:     Vitals:    05/29/24 1300   BP: 148/68   Pulse: 55   Resp: 16   Temp: 98.1 °F (36.7 °C)   TempSrc: Oral   SpO2: 98%   Weight: 122 kg  "(269 lb 9.6 oz)   Height: 182.9 cm (72.01\")   PainSc: 0-No pain     Body mass index is 36.55 kg/m².     Wt Readings from Last 5 Encounters:   05/29/24 122 kg (269 lb 9.6 oz)   05/17/24 123 kg (270 lb 9.6 oz)   02/14/24 122 kg (270 lb)   11/29/23 125 kg (275 lb 1.6 oz)   11/15/23 125 kg (276 lb)     PHYSICAL EXAMINATION:   GENERAL: The patient appears in good general condition, not in acute distress.   SKIN: No ecchymosis.  EYES: No jaundice. No Pallor.  LYMPHATIC: No cervical supraclavicular or axillary lymphadenopathy.  CHEST: Normal respiratory effort.   CVS: No edema.  ABDOMEN: Soft. No tenderness. No Hepatomegaly. No Splenomegaly. No masses.  EXTREMITIES: No joint deformity.     DIAGNOSTIC DATA:     Results from last 7 days   Lab Units 05/29/24  1250   WBC 10*3/mm3 12.31*   NEUTROS ABS 10*3/mm3 7.31*   HEMOGLOBIN g/dL 13.5   HEMATOCRIT % 41.8   PLATELETS 10*3/mm3 323         Lab 05/29/24  1250   IRON 85   IRON SATURATION (TSAT) 24   TIBC 356   TRANSFERRIN 239   FERRITIN 146.00      Component      Latest Ref Rng 11/29/2023 5/29/2024   Reticulocyte %      0.70 - 1.90 % 1.20  1.17      Component      Latest Ref Rng 11/29/2023   Methylmalonic Acid      0 - 378 nmol/L 485 (H)       Assessment & Plan    *Lymphocytosis.  It dates back to 2015 when lymphocyte count was 4600 on 10/22/2015.  Lymphocyte count was 4540 on 1/27/2023.  Lymphocyte count was 3780 on 3/22/2023.  Peripheral blood flow cytometry on 3/22/2023 CD3 positive, CD8 positive, T cell /large granular lymphocyte population representing 10% of the total events suspicious for a clonal population by T-cell receptor analysis.  5/31/2023: Lymphocyte count 4360.  Based on the percentage of the lymphocytes, total LGL population is about 1000.  This represented LGL lymphocytosis.  11/29/2023: Lymphocyte count improved to 3700.  5/29/2024: Lymphocyte count increased to 4210.  Exam revealed no lymphadenopathy or splenomegaly.    No evidence of transformation to LGL " leukemia.    *Iron deficiency anemia.  Hemoglobin was 13.4 on 12/3/2019  Hemoglobin was 13.4 on 3/22/2023.  5/31/2023: Hemoglobin 13.2.  11/29/2023: Hemoglobin decreased to 12.9.  Anemia workup revealed borderline transferrin saturation of 20%.  He was started on ferrous sulfate 325 mg every other day.  5/29/2024: Hemoglobin improved to 13.5.  Transferrin saturation improved to 24%.  Ferritin 146.  He is tolerating the oral iron.    *Vitamin B12 deficiency anemia.  11/20/2023: Vitamin B12 was 497.  Methylmalonic acid level was 485.  Patient was started on vitamin B12 1000 mcg daily.  5/29/2024: Hemoglobin improved to 13.5.    PLAN:    1.  Continue ferrous sulfate 325 mg every other day.    2.  Continue vitamin B12 1000 mcg daily.    3.  Will continue to monitor the LGL lymphocytosis.    4.  We will see him in follow-up in 6 months.  We will obtain CBC reticulocyte count ferritin iron panel vitamin B12 folate and methylmalonic acid levels.        Ruth Salter MD  05/29/24

## 2024-05-30 DIAGNOSIS — E78.2 MIXED HYPERLIPIDEMIA: ICD-10-CM

## 2024-05-30 DIAGNOSIS — E11.9 CONTROLLED TYPE 2 DIABETES MELLITUS WITHOUT COMPLICATION, WITHOUT LONG-TERM CURRENT USE OF INSULIN: ICD-10-CM

## 2024-06-03 LAB — METHYLMALONATE SERPL-SCNC: 330 NMOL/L (ref 0–378)

## 2024-06-20 DIAGNOSIS — I10 UNCONTROLLED HYPERTENSION: ICD-10-CM

## 2024-06-20 RX ORDER — TORSEMIDE 5 MG/1
2.5 TABLET ORAL DAILY
Qty: 30 TABLET | Refills: 0 | Status: SHIPPED | OUTPATIENT
Start: 2024-06-20

## 2024-06-28 ENCOUNTER — OFFICE VISIT (OUTPATIENT)
Dept: SPORTS MEDICINE | Facility: CLINIC | Age: 79
End: 2024-06-28
Payer: MEDICARE

## 2024-06-28 VITALS
DIASTOLIC BLOOD PRESSURE: 72 MMHG | HEART RATE: 78 BPM | BODY MASS INDEX: 36.57 KG/M2 | OXYGEN SATURATION: 98 % | TEMPERATURE: 97.7 F | SYSTOLIC BLOOD PRESSURE: 156 MMHG | WEIGHT: 270 LBS | HEIGHT: 72 IN

## 2024-06-28 DIAGNOSIS — M25.552 CHRONIC LEFT HIP PAIN: Primary | ICD-10-CM

## 2024-06-28 DIAGNOSIS — G89.29 CHRONIC LEFT HIP PAIN: Primary | ICD-10-CM

## 2024-06-28 PROCEDURE — 3078F DIAST BP <80 MM HG: CPT | Performed by: FAMILY MEDICINE

## 2024-06-28 PROCEDURE — 20610 DRAIN/INJ JOINT/BURSA W/O US: CPT | Performed by: FAMILY MEDICINE

## 2024-06-28 PROCEDURE — 3077F SYST BP >= 140 MM HG: CPT | Performed by: FAMILY MEDICINE

## 2024-06-28 NOTE — PROGRESS NOTES
"Dileep is a 79 y.o. year old male     Chief Complaint   Patient presents with    Hip Pain     LEFT HIP- Patient is here to follow up on his left hip pain.        History of Present Illness  Hip Pain        Here today for ongoing chronic left hip pain.  Since his last visit he had considerable improvement for at least 6 months from steroid injection to the greater trochanter of the left hip.  He feels like his pain has been gradually returning over the past few weeks.  Similar in nature compared to prior, L lateral, worse with physical activities.      Review of Systems    /72 (BP Location: Right arm, Patient Position: Sitting, Cuff Size: Adult)   Pulse 78   Temp 97.7 °F (36.5 °C) (Temporal)   Ht 182.9 cm (72.01\")   Wt 122 kg (270 lb)   SpO2 98%   BMI 36.61 kg/m²      Physical Exam    Vital signs reviewed.   General: No acute distress.      MSK Exam:  Ortho Exam  Left hip: Normal/stable appearance.  Tenderness to palpation on the greater trochanter.  There is normal range of motion.  Normal strength.  - Large Joint Arthrocentesis: L greater trochanteric bursa on 6/28/2024 2:57 PM  Indications: pain  Medications: 2 mL lidocaine 1 %, 2 mL triamcinolone acetonide 40 MG/ML  Outcome: tolerated well, no immediate complications  Procedure, treatment alternatives, risks and benefits explained, specific risks discussed. Immediately prior to procedure a time out was called to verify the correct patient, procedure, equipment, support staff and site/side marked as required. Patient was prepped and draped in the usual sterile fashion.            Diagnoses and all orders for this visit:    Chronic left hip pain  -     - Large Joint Arthrocentesis    Discussed options and agreed on repeat injection to the greater trochanter.  The bursa has been removed, so I am considering this more of an insertional tendinitis than anything else.  Will follow his progress from here and follow-up as needed.      EMR Dragon/Transcription " disclaimer:    Much of this encounter note is an electronic transcription/translation of spoken language to printed text.  The electronic translation of spoken language may permit erroneous, or at times, nonsensical words or phrases to be inadvertently transcribed.  Although I have reviewed the note for such errors some may still exist.

## 2024-08-15 DIAGNOSIS — I10 UNCONTROLLED HYPERTENSION: ICD-10-CM

## 2024-08-15 RX ORDER — TORSEMIDE 5 MG/1
2.5 TABLET ORAL DAILY
Qty: 30 TABLET | Refills: 0 | Status: SHIPPED | OUTPATIENT
Start: 2024-08-15

## 2024-08-24 DIAGNOSIS — I10 UNCONTROLLED HYPERTENSION: ICD-10-CM

## 2024-08-24 DIAGNOSIS — I10 ESSENTIAL HYPERTENSION: ICD-10-CM

## 2024-08-26 RX ORDER — OLMESARTAN MEDOXOMIL 40 MG/1
TABLET ORAL
Qty: 90 TABLET | Refills: 0 | Status: SHIPPED | OUTPATIENT
Start: 2024-08-26

## 2024-09-27 ENCOUNTER — TELEPHONE (OUTPATIENT)
Dept: SPORTS MEDICINE | Facility: CLINIC | Age: 79
End: 2024-09-27

## 2024-09-27 NOTE — TELEPHONE ENCOUNTER
Caller: Dileep Hamilton    Relationship to patient: Self    Best call back number:     Chief complaint: LEFT HIP     Type of visit: FUP    Requested date: WEDNESDAY OR FRIDAY IF POSSIBLE      If rescheduling, when is the original appointment: [id number]     Additional notes:[facility]

## 2024-10-14 DIAGNOSIS — I10 UNCONTROLLED HYPERTENSION: ICD-10-CM

## 2024-10-14 RX ORDER — TORSEMIDE 5 MG/1
2.5 TABLET ORAL DAILY
Qty: 30 TABLET | Refills: 0 | Status: SHIPPED | OUTPATIENT
Start: 2024-10-14

## 2024-11-13 ENCOUNTER — PROCEDURE VISIT (OUTPATIENT)
Dept: SPORTS MEDICINE | Facility: CLINIC | Age: 79
End: 2024-11-13
Payer: MEDICARE

## 2024-11-13 VITALS
SYSTOLIC BLOOD PRESSURE: 166 MMHG | DIASTOLIC BLOOD PRESSURE: 72 MMHG | HEART RATE: 68 BPM | HEIGHT: 72 IN | WEIGHT: 270 LBS | OXYGEN SATURATION: 97 % | BODY MASS INDEX: 36.57 KG/M2 | TEMPERATURE: 96.9 F

## 2024-11-13 DIAGNOSIS — M25.552 CHRONIC LEFT HIP PAIN: Primary | ICD-10-CM

## 2024-11-13 DIAGNOSIS — G89.29 CHRONIC LEFT HIP PAIN: Primary | ICD-10-CM

## 2024-11-13 PROCEDURE — 20611 DRAIN/INJ JOINT/BURSA W/US: CPT | Performed by: FAMILY MEDICINE

## 2024-11-13 PROCEDURE — 99213 OFFICE O/P EST LOW 20 MIN: CPT | Performed by: FAMILY MEDICINE

## 2024-11-13 NOTE — PROGRESS NOTES
"Dileep is a 79 y.o. year old male     Chief Complaint   Patient presents with    Hip Pain     LEFT HIP- Patient is here to follow up on his left hip pain, interested in repeat steroid injection.        History of Present Illness  History of Present Illness    Here today for recurrent left hip pain.  Describes his pain feels deeper than it has in the past.  No obvious benefit from his last injections.    I have reviewed the patient's medical, family, and social history in detail and updated the computerized patient record.    Review of Systems    /72 (BP Location: Left arm, Patient Position: Sitting, Cuff Size: Adult)   Pulse 68   Temp 96.9 °F (36.1 °C) (Temporal)   Ht 182.9 cm (72.01\")   Wt 122 kg (270 lb)   SpO2 97%   BMI 36.61 kg/m²      Physical Exam    Vital signs reviewed.   General: No acute distress.      Physical Exam    Left hip: Normal appearance.  There is no obvious tenderness to palpation on the greater trochanter.  He does have pain reproduced with impingement provocation, FADIR, Stinchfield test.  No pain with cross leg stretch or with resisted abduction or external rotation.    Results  Results      - Large Joint Arthrocentesis: L hip joint on 11/13/2024 11:47 AM  Indications: pain  Details: 22 G needle, ultrasound-guided  Medications: 2 mL lidocaine 1 %, 1 mL triamcinolone acetonide 40 MG/ML  Outcome: tolerated well, no immediate complications  Procedure, treatment alternatives, risks and benefits explained, specific risks discussed. Immediately prior to procedure a time out was called to verify the correct patient, procedure, equipment, support staff and site/side marked as required. Patient was prepped and draped in the usual sterile fashion.        Diagnoses and all orders for this visit:    Chronic left hip pain  -     - Large Joint Arthrocentesis: L hip joint       Discussed options and we agreed upon repeat injection to the left hip joint for underlying arthritis as I suspect his " pain is multifactorial.  Will plan to follow-up in a few weeks to reassess.

## 2024-11-22 DIAGNOSIS — I10 ESSENTIAL HYPERTENSION: ICD-10-CM

## 2024-11-22 DIAGNOSIS — I10 UNCONTROLLED HYPERTENSION: ICD-10-CM

## 2024-11-22 RX ORDER — OLMESARTAN MEDOXOMIL 40 MG/1
TABLET ORAL
Qty: 90 TABLET | Refills: 2 | Status: SHIPPED | OUTPATIENT
Start: 2024-11-22

## 2024-11-26 ENCOUNTER — LAB (OUTPATIENT)
Dept: LAB | Facility: HOSPITAL | Age: 79
End: 2024-11-26
Payer: MEDICARE

## 2024-11-26 ENCOUNTER — OFFICE VISIT (OUTPATIENT)
Dept: ONCOLOGY | Facility: CLINIC | Age: 79
End: 2024-11-26
Payer: MEDICARE

## 2024-11-26 VITALS
HEART RATE: 55 BPM | SYSTOLIC BLOOD PRESSURE: 177 MMHG | RESPIRATION RATE: 17 BRPM | HEIGHT: 72 IN | DIASTOLIC BLOOD PRESSURE: 82 MMHG | TEMPERATURE: 97.4 F | BODY MASS INDEX: 34.7 KG/M2 | OXYGEN SATURATION: 94 % | WEIGHT: 256.2 LBS

## 2024-11-26 DIAGNOSIS — D51.3 OTHER DIETARY VITAMIN B12 DEFICIENCY ANEMIA: ICD-10-CM

## 2024-11-26 DIAGNOSIS — C91.Z0 LARGE GRANULAR LYMPHOCYTE DISORDER: ICD-10-CM

## 2024-11-26 DIAGNOSIS — D50.9 IRON DEFICIENCY ANEMIA, UNSPECIFIED IRON DEFICIENCY ANEMIA TYPE: ICD-10-CM

## 2024-11-26 DIAGNOSIS — C91.Z0 LARGE GRANULAR LYMPHOCYTE DISORDER: Primary | ICD-10-CM

## 2024-11-26 LAB
BASOPHILS # BLD AUTO: 0.03 10*3/MM3 (ref 0–0.2)
BASOPHILS NFR BLD AUTO: 0.2 % (ref 0–1.5)
DEPRECATED RDW RBC AUTO: 43.8 FL (ref 37–54)
EOSINOPHIL # BLD AUTO: 0.12 10*3/MM3 (ref 0–0.4)
EOSINOPHIL NFR BLD AUTO: 0.9 % (ref 0.3–6.2)
ERYTHROCYTE [DISTWIDTH] IN BLOOD BY AUTOMATED COUNT: 12.5 % (ref 12.3–15.4)
FERRITIN SERPL-MCNC: 183 NG/ML (ref 30–400)
FOLATE SERPL-MCNC: 11.2 NG/ML (ref 4.78–24.2)
HCT VFR BLD AUTO: 41 % (ref 37.5–51)
HGB BLD-MCNC: 13.3 G/DL (ref 13–17.7)
HGB RETIC QN AUTO: 35.6 PG (ref 29.8–36.1)
IMM GRANULOCYTES # BLD AUTO: 0.04 10*3/MM3 (ref 0–0.05)
IMM GRANULOCYTES NFR BLD AUTO: 0.3 % (ref 0–0.5)
IMM RETICS NFR: 6.3 % (ref 3–15.8)
IRON 24H UR-MRATE: 44 MCG/DL (ref 59–158)
IRON SATN MFR SERPL: 13 % (ref 20–50)
LYMPHOCYTES # BLD AUTO: 4.4 10*3/MM3 (ref 0.7–3.1)
LYMPHOCYTES NFR BLD AUTO: 32.5 % (ref 19.6–45.3)
MCH RBC QN AUTO: 31.4 PG (ref 26.6–33)
MCHC RBC AUTO-ENTMCNC: 32.4 G/DL (ref 31.5–35.7)
MCV RBC AUTO: 96.7 FL (ref 79–97)
MONOCYTES # BLD AUTO: 0.77 10*3/MM3 (ref 0.1–0.9)
MONOCYTES NFR BLD AUTO: 5.7 % (ref 5–12)
NEUTROPHILS NFR BLD AUTO: 60.4 % (ref 42.7–76)
NEUTROPHILS NFR BLD AUTO: 8.19 10*3/MM3 (ref 1.7–7)
NRBC BLD AUTO-RTO: 0 /100 WBC (ref 0–0.2)
PLATELET # BLD AUTO: 334 10*3/MM3 (ref 140–450)
PMV BLD AUTO: 9.6 FL (ref 6–12)
RBC # BLD AUTO: 4.24 10*6/MM3 (ref 4.14–5.8)
RETICS # AUTO: 0.04 10*6/MM3 (ref 0.02–0.13)
RETICS/RBC NFR AUTO: 0.91 % (ref 0.7–1.9)
TIBC SERPL-MCNC: 334 MCG/DL (ref 298–536)
TRANSFERRIN SERPL-MCNC: 224 MG/DL (ref 200–360)
VIT B12 BLD-MCNC: 1711 PG/ML (ref 211–946)
WBC NRBC COR # BLD AUTO: 13.55 10*3/MM3 (ref 3.4–10.8)

## 2024-11-26 PROCEDURE — 82728 ASSAY OF FERRITIN: CPT

## 2024-11-26 PROCEDURE — 85046 RETICYTE/HGB CONCENTRATE: CPT

## 2024-11-26 PROCEDURE — 3077F SYST BP >= 140 MM HG: CPT | Performed by: INTERNAL MEDICINE

## 2024-11-26 PROCEDURE — 84466 ASSAY OF TRANSFERRIN: CPT

## 2024-11-26 PROCEDURE — 82607 VITAMIN B-12: CPT | Performed by: INTERNAL MEDICINE

## 2024-11-26 PROCEDURE — 3079F DIAST BP 80-89 MM HG: CPT | Performed by: INTERNAL MEDICINE

## 2024-11-26 PROCEDURE — 1125F AMNT PAIN NOTED PAIN PRSNT: CPT | Performed by: INTERNAL MEDICINE

## 2024-11-26 PROCEDURE — 85025 COMPLETE CBC W/AUTO DIFF WBC: CPT

## 2024-11-26 PROCEDURE — 83540 ASSAY OF IRON: CPT

## 2024-11-26 PROCEDURE — 82746 ASSAY OF FOLIC ACID SERUM: CPT | Performed by: INTERNAL MEDICINE

## 2024-11-26 PROCEDURE — 99214 OFFICE O/P EST MOD 30 MIN: CPT | Performed by: INTERNAL MEDICINE

## 2024-11-26 PROCEDURE — 1159F MED LIST DOCD IN RCRD: CPT | Performed by: INTERNAL MEDICINE

## 2024-11-26 PROCEDURE — 36415 COLL VENOUS BLD VENIPUNCTURE: CPT

## 2024-11-26 PROCEDURE — 1160F RVW MEDS BY RX/DR IN RCRD: CPT | Performed by: INTERNAL MEDICINE

## 2024-11-26 NOTE — PROGRESS NOTES
"Subjective     CHIEF COMPLAINT:      Chief Complaint   Patient presents with    Follow-up     HISTORY OF PRESENT ILLNESS:     Dileep Hamilton is a 79 y.o. male patient who returns today for follow up on his LGL lymphocytosis and anemia.  He is on oral iron every other day.  He is on vitamin B12 daily.  He is taking the B12 daily but reports missing a few doses.  He received left hip steroid injection on 11/13/2024.    ROS:  Pertinent ROS is in the HPI.     Past medical, surgical, social and family history were reviewed.     MEDICATIONS:    Current Outpatient Medications:     amLODIPine (NORVASC) 5 MG tablet, Take 2 tablets by mouth Daily., Disp: 180 tablet, Rfl: 1    Cholecalciferol (VITAMIN D3) 20 MCG (800 UNIT) tablet, Take 800 Int'l Units by mouth Daily., Disp: 75 tablet, Rfl:     Coenzyme Q10 (COQ-10) 100 MG capsule, Take 1 capsule by mouth Daily., Disp: , Rfl:     ferrous sulfate 325 (65 FE) MG tablet, Take 1 tablet by mouth Every Other Day., Disp: , Rfl:     Multiple Vitamin (MULTI VITAMIN DAILY PO), Take 1 tablet by mouth Daily., Disp: , Rfl:     olmesartan (BENICAR) 40 MG tablet, Take 1 tablet by mouth once daily, Disp: 90 tablet, Rfl: 2    Omega-3 Fatty Acids (FISH OIL) 1000 MG capsule capsule, Take 1 capsule by mouth Daily With Breakfast., Disp: , Rfl:     Pitavastatin Magnesium 1 MG tablet, Take 1 mg by mouth Daily., Disp: 90 tablet, Rfl: 2    torsemide (DEMADEX) 5 MG tablet, Take 1/2 (one-half) tablet by mouth once daily, Disp: 30 tablet, Rfl: 0    vitamin B-12 (CYANOCOBALAMIN) 1000 MCG tablet, Take 1 tablet by mouth Daily., Disp: , Rfl:   Objective     VITAL SIGNS:     Vitals:    11/26/24 1558   BP: 177/82   Pulse: 55   Resp: 17   Temp: 97.4 °F (36.3 °C)   TempSrc: Oral   SpO2: 94%   Weight: 116 kg (256 lb 3.2 oz)  Comment: Pt is trying to not gain weight   Height: 182.9 cm (72.01\")   PainSc:   2   PainLoc: Hip     Body mass index is 34.74 kg/m².     Wt Readings from Last 5 Encounters:   11/26/24 116 kg " (256 lb 3.2 oz)   11/13/24 122 kg (270 lb)   06/28/24 122 kg (270 lb)   05/29/24 122 kg (269 lb 9.6 oz)   05/17/24 123 kg (270 lb 9.6 oz)     PHYSICAL EXAMINATION:   GENERAL: The patient appears in good general condition, not in acute distress.   SKIN: No Ecchymosis.  EYES: No jaundice. No Pallor.  LYMPHATIC: No cervical, supraclavicular or axillary lymphadenopathy.  CHEST: Normal respiratory effort.   ABDOMEN: Soft. No tenderness. No Hepatomegaly. No Splenomegaly. No masses.  EXTREMITIES: No joint deformity.     DIAGNOSTIC DATA:     Results from last 7 days   Lab Units 11/26/24  1551   WBC 10*3/mm3 13.55*   NEUTROS ABS 10*3/mm3 8.19*   HEMOGLOBIN g/dL 13.3   HEMATOCRIT % 41.0   PLATELETS 10*3/mm3 334     Component      Latest Ref Rn 11/29/2023 5/10/2024 5/29/2024 11/26/2024   Lymphocytes Absolute      0.70 - 3.10 10*3/mm3 3.70 (H)  3.3 (H)  4.21 (H)  4.40 (H)           Lab 11/26/24  1551   IRON 44*   IRON SATURATION (TSAT) 13*   TIBC 334   TRANSFERRIN 224   FERRITIN 183.00   FOLATE 11.20   VITAMIN B 12 1,711*      Component      Latest Ref Rng 11/26/2024   Reticulocyte %      0.70 - 1.90 % 0.91      Assessment & Plan    *Lymphocytosis.  It dates back to 2015 when lymphocyte count was 4600 on 10/22/2015.  Lymphocyte count was 4540 on 1/27/2023.  Lymphocyte count was 3780 on 3/22/2023.  Peripheral blood flow cytometry on 3/22/2023 CD3 positive, CD8 positive, T cell /large granular lymphocyte population representing 10% of the total events suspicious for a clonal population by T-cell receptor analysis.  5/31/2023: Lymphocyte count 4360.  Based on the percentage of the lymphocytes, total LGL population is about 1000.  This represented LGL lymphocytosis.  11/29/2023: Lymphocyte count improved to 3700.  5/29/2024: Lymphocyte count increased to 4210.  11/26/2024: Lymphocyte count increased to 4400.  WBC increased to 13,550.  Neutrophils increased to 8190.  The increase in the WBC count and neutrophil and lymphocyte count  is attributed to recent steroid injection to the left hip on 11/13/2024.  Exam revealed no lymphadenopathy or splenomegaly.  No evidence of transformation to LGL leukemia.    *Iron deficiency anemia.  Hemoglobin was 13.4 on 12/3/2019  Hemoglobin was 13.4 on 3/22/2023.  5/31/2023: Hemoglobin 13.2.  11/29/2023: Hemoglobin decreased to 12.9.  Anemia workup revealed borderline transferrin saturation of 20%.  He was started on ferrous sulfate 325 mg every other day.  5/29/2024: Hemoglobin improved to 13.5.  Transferrin saturation improved to 24%.  Ferritin 146.  11/26/2024: Hemoglobin 13.3.  Ferritin 183.  Transferrin saturation decreased to 13%.  He is taking the iron but reports missing a few doses.    *Vitamin B12 deficiency anemia.  11/20/2023: Vitamin B12 was 497.  Methylmalonic acid level was 485.  Patient was started on vitamin B12 1000 mcg daily.  5/29/2024: Hemoglobin improved to 13.5.  11/26/2024: Hemoglobin 13.3.  Vitamin B12 improved to 1711.  Folate 11.2.    PLAN:    1.  Increase ferrous sulfate to 4 days a week.  I asked him to take it away from the time that he drinks tea/coffee.  2.  Continue vitamin B12 daily.  3.  We we will continue to monitor the LGL lymphocytosis.  4.  Follow-up in 6 months.  We will obtain CBC reticulocyte count ferritin iron panel vitamin B12 folate and MMA.      Ruth Salter MD  11/26/24

## 2024-12-03 LAB — METHYLMALONATE SERPL-SCNC: 324 NMOL/L (ref 0–378)

## 2024-12-04 ENCOUNTER — OFFICE VISIT (OUTPATIENT)
Dept: SPORTS MEDICINE | Facility: CLINIC | Age: 79
End: 2024-12-04
Payer: MEDICARE

## 2024-12-04 VITALS
HEIGHT: 72 IN | BODY MASS INDEX: 34.67 KG/M2 | OXYGEN SATURATION: 97 % | WEIGHT: 256 LBS | TEMPERATURE: 97.3 F | SYSTOLIC BLOOD PRESSURE: 162 MMHG | HEART RATE: 57 BPM | DIASTOLIC BLOOD PRESSURE: 84 MMHG

## 2024-12-04 DIAGNOSIS — M25.552 GREATER TROCHANTERIC PAIN SYNDROME OF LEFT LOWER EXTREMITY: Primary | ICD-10-CM

## 2024-12-04 PROCEDURE — G2211 COMPLEX E/M VISIT ADD ON: HCPCS | Performed by: FAMILY MEDICINE

## 2024-12-04 PROCEDURE — 3077F SYST BP >= 140 MM HG: CPT | Performed by: FAMILY MEDICINE

## 2024-12-04 PROCEDURE — 99214 OFFICE O/P EST MOD 30 MIN: CPT | Performed by: FAMILY MEDICINE

## 2024-12-04 PROCEDURE — 3079F DIAST BP 80-89 MM HG: CPT | Performed by: FAMILY MEDICINE

## 2024-12-04 NOTE — PROGRESS NOTES
"Dileep is a 79 y.o. year old male     Chief Complaint   Patient presents with    Hip Pain     LEFT HIP- Patient is here to follow up on his left hip pain, it is doing better.        History of Present Illness  History of Present Illness  The patient is here today to follow up on chronic left hip pain.    Since his last visit, he experienced significant relief from deep internal joint pain following an intra-articular injection. However, he continues to suffer from chronic lateral hip pain, which has been a persistent issue for several years. This pain extends somewhat down the lateral aspect of the thigh and is particularly exacerbated by direct pressure.    I have reviewed the patient's medical, family, and social history in detail and updated the computerized patient record.    Review of Systems    /84 (BP Location: Right arm, Patient Position: Sitting, Cuff Size: Adult)   Pulse 57   Temp 97.3 °F (36.3 °C) (Temporal)   Ht 182.9 cm (72.01\")   Wt 116 kg (256 lb)   SpO2 97%   BMI 34.71 kg/m²      Physical Exam    Vital signs reviewed.   General: No acute distress.      Physical Exam  Stable tenderness is present along the greater trochanter extending slightly distal on the lateral thigh. Normal range of motion is observed. Mild pain is noted with across stretch. Normal strength is demonstrated without pain with abduction, external rotation.    Results  Results  Imaging  Previous MRI showed normal tendon insertions. Lumbar MRI was also conducted.    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      Assessment & Plan  1. Chronic left hip pain.  He had good relief of his deep internal joint pain with intra-articular injection, but he still has lateral hip pain that's been chronic " for years. Pain radiates somewhat down the lateral aspect of the thigh, worse with direct pressure. Physical exam shows stable tenderness along the greater trochanter extending slightly distal on the lateral thigh, normal range of motion, mild pain with across stretch, and normal strength without pain with abduction and external rotation. Old imaging shows normal tendon insertions on previous MRI. He also had a lumbar MRI and had no response from epidural injections prior to his greater trochanteric bursectomy. He has had no benefit from PRP injections, and his last steroid injection in June 2024 did not give him any significant benefits. We discussed considering different options to manage this condition. He will start with a topical compounded cream with gabapentin for possible chronic hypersensitivity pain. If that is ineffective, perineural injection therapy, a Lyftogt technique to address possible tissue plane-mediated pain, will be considered.    Follow-up  Return in about 4 weeks to check on his progress.    Patient or patient representative verbalized consent for the use of Ambient Listening during the visit with  Gee Powell MD for chart documentation. 12/4/2024  17:05 EST    *Dictated after leaving exam room.     Gee Powell MD   15:28 EST   12/04/24

## 2024-12-04 NOTE — PATIENT INSTRUCTIONS
- Try topical compound cream next    - If not effective consider Lyftogt or (perineural injection therapy) technique -  the layers of tissue that may be causing pain against one another     - Can consider repeat PRP or steroid injection although not effective in past    Mother is RH Positive

## 2024-12-10 DIAGNOSIS — I10 UNCONTROLLED HYPERTENSION: ICD-10-CM

## 2024-12-10 RX ORDER — TORSEMIDE 5 MG/1
2.5 TABLET ORAL DAILY
Qty: 30 TABLET | Refills: 0 | OUTPATIENT
Start: 2024-12-10

## 2024-12-30 DIAGNOSIS — I10 UNCONTROLLED HYPERTENSION: Primary | ICD-10-CM

## 2024-12-30 DIAGNOSIS — D72.820 LYMPHOCYTOSIS: ICD-10-CM

## 2024-12-30 DIAGNOSIS — N17.9 AKI (ACUTE KIDNEY INJURY): ICD-10-CM

## 2024-12-30 DIAGNOSIS — E11.9 CONTROLLED TYPE 2 DIABETES MELLITUS WITHOUT COMPLICATION, WITHOUT LONG-TERM CURRENT USE OF INSULIN: ICD-10-CM

## 2024-12-30 DIAGNOSIS — E55.9 VITAMIN D DEFICIENCY: ICD-10-CM

## 2024-12-30 DIAGNOSIS — E78.2 MIXED HYPERLIPIDEMIA: ICD-10-CM

## 2025-01-03 LAB
25(OH)D3+25(OH)D2 SERPL-MCNC: 36.3 NG/ML (ref 30–100)
ALBUMIN SERPL-MCNC: 3.9 G/DL (ref 3.5–5.2)
ALBUMIN/GLOB SERPL: 1.3 G/DL
ALP SERPL-CCNC: 91 U/L (ref 39–117)
ALT SERPL-CCNC: 14 U/L (ref 1–41)
AST SERPL-CCNC: 13 U/L (ref 1–40)
BASOPHILS # BLD AUTO: 0.03 10*3/MM3 (ref 0–0.2)
BASOPHILS NFR BLD AUTO: 0.3 % (ref 0–1.5)
BILIRUB SERPL-MCNC: 0.6 MG/DL (ref 0–1.2)
BUN SERPL-MCNC: 41 MG/DL (ref 8–23)
BUN/CREAT SERPL: 23.6 (ref 7–25)
CALCIUM SERPL-MCNC: 9.4 MG/DL (ref 8.6–10.5)
CHLORIDE SERPL-SCNC: 105 MMOL/L (ref 98–107)
CK SERPL-CCNC: 79 U/L (ref 20–200)
CO2 SERPL-SCNC: 24.1 MMOL/L (ref 22–29)
CREAT SERPL-MCNC: 1.74 MG/DL (ref 0.76–1.27)
EGFRCR SERPLBLD CKD-EPI 2021: 39.9 ML/MIN/1.73
EOSINOPHIL # BLD AUTO: 0.2 10*3/MM3 (ref 0–0.4)
EOSINOPHIL NFR BLD AUTO: 1.9 % (ref 0.3–6.2)
ERYTHROCYTE [DISTWIDTH] IN BLOOD BY AUTOMATED COUNT: 12.8 % (ref 12.3–15.4)
GLOBULIN SER CALC-MCNC: 2.9 GM/DL
GLUCOSE SERPL-MCNC: 104 MG/DL (ref 65–99)
HBA1C MFR BLD: 6 % (ref 4.8–5.6)
HCT VFR BLD AUTO: 39.2 % (ref 37.5–51)
HGB BLD-MCNC: 13.2 G/DL (ref 13–17.7)
IMM GRANULOCYTES # BLD AUTO: 0.03 10*3/MM3 (ref 0–0.05)
IMM GRANULOCYTES NFR BLD AUTO: 0.3 % (ref 0–0.5)
LYMPHOCYTES # BLD AUTO: 3.41 10*3/MM3 (ref 0.7–3.1)
LYMPHOCYTES NFR BLD AUTO: 31.5 % (ref 19.6–45.3)
MCH RBC QN AUTO: 31.4 PG (ref 26.6–33)
MCHC RBC AUTO-ENTMCNC: 33.7 G/DL (ref 31.5–35.7)
MCV RBC AUTO: 93.1 FL (ref 79–97)
MONOCYTES # BLD AUTO: 0.65 10*3/MM3 (ref 0.1–0.9)
MONOCYTES NFR BLD AUTO: 6 % (ref 5–12)
NEUTROPHILS # BLD AUTO: 6.49 10*3/MM3 (ref 1.7–7)
NEUTROPHILS NFR BLD AUTO: 60 % (ref 42.7–76)
NRBC BLD AUTO-RTO: 0 /100 WBC (ref 0–0.2)
PLATELET # BLD AUTO: 329 10*3/MM3 (ref 140–450)
POTASSIUM SERPL-SCNC: 5.5 MMOL/L (ref 3.5–5.2)
PROT SERPL-MCNC: 6.8 G/DL (ref 6–8.5)
RBC # BLD AUTO: 4.21 10*6/MM3 (ref 4.14–5.8)
SODIUM SERPL-SCNC: 140 MMOL/L (ref 136–145)
WBC # BLD AUTO: 10.81 10*3/MM3 (ref 3.4–10.8)

## 2025-01-08 ENCOUNTER — OFFICE VISIT (OUTPATIENT)
Dept: INTERNAL MEDICINE | Facility: CLINIC | Age: 80
End: 2025-01-08
Payer: MEDICARE

## 2025-01-08 VITALS
RESPIRATION RATE: 16 BRPM | WEIGHT: 258 LBS | OXYGEN SATURATION: 97 % | HEIGHT: 72 IN | HEART RATE: 64 BPM | DIASTOLIC BLOOD PRESSURE: 60 MMHG | BODY MASS INDEX: 34.95 KG/M2 | SYSTOLIC BLOOD PRESSURE: 140 MMHG

## 2025-01-08 DIAGNOSIS — E66.811 OBESITY (BMI 30.0-34.9): ICD-10-CM

## 2025-01-08 DIAGNOSIS — Z71.85 VACCINE COUNSELING: ICD-10-CM

## 2025-01-08 DIAGNOSIS — I10 UNCONTROLLED HYPERTENSION: ICD-10-CM

## 2025-01-08 DIAGNOSIS — D72.820 LYMPHOCYTOSIS: ICD-10-CM

## 2025-01-08 DIAGNOSIS — I10 ESSENTIAL HYPERTENSION: Primary | ICD-10-CM

## 2025-01-08 DIAGNOSIS — E55.9 VITAMIN D DEFICIENCY: ICD-10-CM

## 2025-01-08 DIAGNOSIS — N18.32 CKD STAGE 3B, GFR 30-44 ML/MIN: ICD-10-CM

## 2025-01-08 DIAGNOSIS — E11.9 CONTROLLED TYPE 2 DIABETES MELLITUS WITHOUT COMPLICATION, WITHOUT LONG-TERM CURRENT USE OF INSULIN: ICD-10-CM

## 2025-01-08 RX ORDER — TORSEMIDE 5 MG/1
5 TABLET ORAL DAILY
Qty: 90 TABLET | Refills: 2 | Status: SHIPPED | OUTPATIENT
Start: 2025-01-08

## 2025-01-08 NOTE — PROGRESS NOTES
"Diabetes, Hyperlipidemia, and Hypertension      HPI  Dileep Hamilton is a 79 y.o. male RTC In f/u: 'Has been OK'.  1. Severe L lateral hip pain, Mild arthritic changes of both hips without hip fracture and Chronic bilateral hamstring origin tendinopathy - s/p LEFT HIP OPEN TROCHANTERIC BURSECTOMY, ILIOTIBIAL BAND TENOTOMY 4/19/23 with Dr. Yañez with additional resolved pre-op pain. Recurrent pain 11/2023 with new dx labral tear on MRI and s/p injection with sports medicine, Dr. Powlel. On watchful waiting right now.  No meds needed for pain control.   2. Essential HTN, Renal duplex with normal RA B after past CR increase on thiazide diuretic trial- getting good numbers on home log, getting consistently in 140's.  Diastolic is low 70's or upper 60's.   3. Lymphocytosis - S/p hematology eval in 11/2024 for f/u.  Noted that things looked better, reassured.     4. DMII with long hx obesity - has lost some weight. Really been under some stress and been more active. \"Watching what I eat'.  Aware of weight loss, intentional.  Exercise is 'not bad'. Has been bowling as primary activity.  Will golf once weather better.   5. HM - had Flu and COVID update; not had RSV/ Tdap, noting \"I am just not sure when he gets shots that I do not really need\";     Answers submitted by the patient for this visit:  Primary Reason for Visit (Submitted on 1/1/2025)  What is the primary reason for your visit?: High Blood Pressure  High Blood Pressure Questionnaire (Submitted on 1/1/2025)  Chief Complaint: Hypertension  Chronicity: recurrent  Progression since onset: unchanged  anxiety: No  peripheral edema: No  Agents associated with hypertension: no associated agents  Compliance problems: no compliance problems        Review of Systems   Constitutional: Negative for malaise/fatigue.   HENT:  Positive for hearing loss (Not as good right now with \"a little head cold\").    Eyes:  Negative for blurred vision.   Cardiovascular:  Negative for chest " "pain, leg swelling, orthopnea and palpitations.   Respiratory:  Negative for shortness of breath.    Musculoskeletal:  Positive for joint pain.   Neurological:  Negative for dizziness, headaches and light-headedness.   Psychiatric/Behavioral:  Negative for altered mental status.        The following portions of the patient's history were reviewed and updated as appropriate: allergies, current medications, past medical history, past social history, and problem list.      Current Outpatient Medications:     amLODIPine (NORVASC) 5 MG tablet, Take 2 tablets by mouth Daily., Disp: 180 tablet, Rfl: 1    Cholecalciferol (VITAMIN D3) 20 MCG (800 UNIT) tablet, Take 800 Int'l Units by mouth Daily., Disp: 75 tablet, Rfl:     Coenzyme Q10 (COQ-10) 100 MG capsule, Take 1 capsule by mouth Daily., Disp: , Rfl:     ferrous sulfate 325 (65 FE) MG tablet, Take 1 tablet by mouth 4 (Four) Times a Week. Mondays Wednesdays Fridays Sundays, Disp: , Rfl:     Ibuprofen 3 %, Gabapentin 10 %, Baclofen 2 %, lidocaine 4 %, Apply 1-2 g topically to the appropriate area as directed 3 (Three) to 4 (Four) times daily., Disp: 90 g, Rfl: 2    Multiple Vitamin (MULTI VITAMIN DAILY PO), Take 1 tablet by mouth Daily., Disp: , Rfl:     olmesartan (BENICAR) 40 MG tablet, Take 1 tablet by mouth once daily, Disp: 90 tablet, Rfl: 2    Omega-3 Fatty Acids (FISH OIL) 1000 MG capsule capsule, Take 1 capsule by mouth Daily With Breakfast., Disp: , Rfl:     Pitavastatin Magnesium 1 MG tablet, Take 1 mg by mouth Daily., Disp: 90 tablet, Rfl: 2    torsemide (DEMADEX) 5 MG tablet, Take 1 tablet by mouth Daily., Disp: 90 tablet, Rfl: 2    vitamin B-12 (CYANOCOBALAMIN) 1000 MCG tablet, Take 1 tablet by mouth Daily., Disp: , Rfl:     Vitals:    01/08/25 0927 01/08/25 0948   BP: 170/78 140/60   Pulse: 64    Resp: 16    SpO2: 97%    Weight: 117 kg (258 lb)    Height: 182.9 cm (72.01\")      Body mass index is 34.98 kg/m².      Physical Exam  Vitals reviewed. "   Constitutional:       General: He is not in acute distress.     Appearance: He is well-developed. He is obese. He is not ill-appearing or toxic-appearing.   HENT:      Head: Normocephalic and atraumatic.      Right Ear: Decreased hearing noted.      Left Ear: Decreased hearing noted.      Mouth/Throat:      Mouth: Mucous membranes are moist. No oral lesions.      Tongue: No lesions.      Pharynx: Oropharynx is clear. No pharyngeal swelling, oropharyngeal exudate, posterior oropharyngeal erythema or uvula swelling.   Eyes:      General: No scleral icterus.     Conjunctiva/sclera: Conjunctivae normal.      Pupils: Pupils are equal, round, and reactive to light.   Neck:      Vascular: No carotid bruit.   Cardiovascular:      Rate and Rhythm: Normal rate and regular rhythm.      Pulses:           Carotid pulses are 2+ on the right side and 2+ on the left side.       Radial pulses are 2+ on the right side and 2+ on the left side.      Heart sounds: Murmur heard.      Systolic murmur is present.   Pulmonary:      Effort: Pulmonary effort is normal. No respiratory distress.      Breath sounds: Normal breath sounds. No wheezing, rhonchi or rales.   Musculoskeletal:      Cervical back: Normal range of motion and neck supple. No muscular tenderness.      Right lower leg: No edema.      Left lower leg: No edema.   Lymphadenopathy:      Cervical: No cervical adenopathy.   Neurological:      Mental Status: He is alert and oriented to person, place, and time.      Cranial Nerves: No cranial nerve deficit.      Gait: Gait normal.   Psychiatric:         Attention and Perception: Attention normal.         Mood and Affect: Mood and affect normal.         Behavior: Behavior normal.         Thought Content: Thought content normal.         Assessment/ Plan  Diagnoses and all orders for this visit:    Essential hypertension  -     torsemide (DEMADEX) 5 MG tablet; Take 1 tablet by mouth Daily.    Uncontrolled hypertension    Vitamin D  deficiency    Obesity (BMI 30.0-34.9)    Controlled type 2 diabetes mellitus without complication, without long-term current use of insulin    Lymphocytosis    CKD stage 3b, GFR 30-44 ml/min    Vaccine counseling        Return in about 4 weeks (around 2/5/2025) for Recheck.      Discussion:  Dileep Hamilton is a 79 y.o. male RTC In f/u:  1. L hip pain - hx ofSevere L lateral hip pain, Mild arthritic changes of both hips without hip fracture and Chronic bilateral hamstring origin tendinopathy s/p LEFT HIP OPEN TROCHANTERIC BURSECTOMY, ILIOTIBIAL BAND TENOTOMY 4/19/23 with Dr. Yañez with additional resolved pre-op pain. Recurrent pain 11/2023 with new dx labral tear on MRI and s/p injection with sports medicine, Dr. Powell.  Some recurrence of pain, but on watchful waiting strategy at this time as patient slowly ramps up activity level/restart golf.  2. Essential HTN, Renal duplex with normal RA B after past CR increase on thiazide diuretic trial; CKD IIIb- unontrolled on home log on 3 medications and on office check today.  Mild hyperkalemia at 5.5 noted, but on high dose ARB and very low dose loop diuretic.  Increase torsemide to 5 mg daily.  Trend blood pressure log and BMP in 4 weeks.  3. Lymphocytosis - S/p hematology eval 11/2024, note reviewed,, thus far not C/W LGL leukemia. Stable numbers overall today with no progression of lymphocytosis on CBC.  F/U hematology as planned.  4. DMII with long hx obesity - remains diet controlled,  A1C improved over the last year with noted weight loss/TLC diet mods.  Still concerned about concurrent AIDEE, but patient has declined evaluation in past.  C/W ARB daily.    5. HLD, intolerant to Pravastatin with CoQ10 and Livalo expense in past - now on pitavastatin generic low-dose, good tolerance.  CK on labs today. Trend lipids next labs.   6. Vitamin D deficiency - replete on Vit D 800 I.U. daily.   7. Colon CA, cecum - Stage I, s/p R colon resection in 12/5/2014.  C-scope clear  3/2018 with Dr. Sin --> repeat in 5 years, overdue.  Reviewed with patient he declined formal referral last visit agreeing to call and make appointment.  Await report.   8. HM -flu/COVID update- UTD; RSV/ Tdap due, complete at pharmacy, counseled at length today    RTC 4 weeks, BMP same day

## 2025-01-31 ENCOUNTER — OFFICE VISIT (OUTPATIENT)
Dept: INTERNAL MEDICINE | Facility: CLINIC | Age: 80
End: 2025-01-31
Payer: MEDICARE

## 2025-01-31 VITALS
HEART RATE: 61 BPM | TEMPERATURE: 97.9 F | HEIGHT: 72 IN | DIASTOLIC BLOOD PRESSURE: 60 MMHG | OXYGEN SATURATION: 98 % | WEIGHT: 258 LBS | SYSTOLIC BLOOD PRESSURE: 150 MMHG | BODY MASS INDEX: 34.95 KG/M2

## 2025-01-31 DIAGNOSIS — I10 UNCONTROLLED HYPERTENSION: Primary | ICD-10-CM

## 2025-01-31 DIAGNOSIS — I10 ESSENTIAL HYPERTENSION: ICD-10-CM

## 2025-01-31 DIAGNOSIS — R29.818 SUSPECTED SLEEP APNEA: ICD-10-CM

## 2025-01-31 DIAGNOSIS — N18.32 CKD STAGE 3B, GFR 30-44 ML/MIN: ICD-10-CM

## 2025-01-31 DIAGNOSIS — E66.811 OBESITY (BMI 30.0-34.9): ICD-10-CM

## 2025-01-31 DIAGNOSIS — N17.9 AKI (ACUTE KIDNEY INJURY): ICD-10-CM

## 2025-01-31 NOTE — PROGRESS NOTES
"Essential hypertension      HPI  Dileep Hamilton is a 79 y.o. male RTC in f/u:   1. Essential HTN, Renal duplex with normal RA B after past CR increase on thiazide diuretic trial; CKD IIIb - unontrolled on home log on 3 medications and on office check today.  Increased furosemide. TOlerating well. NO increase nocturia.  No home log, but does have cuff.   Is not sure if taking 1 amlodipine a day or 2 amlodipine pills a day.  Notes \"my wife does my pillbox\".  2. Colon CA, cecum - Stage I, s/p R colon resection in 12/5/2014.  C-scope clear 3/2018 with Dr. Sin --> repeat in 5 years, overdue. Has not called yet, 'too busy doing other things'.   3. HM -RSV/ Tdap due, complete at pharmacy, \"Ill get that\".       Answers submitted by the patient for this visit:  High Blood Pressure Questionnaire (Submitted on 1/24/2025)  Chief Complaint: Hypertension  Chronicity: chronic  Onset: more than 1 year ago  Progression since onset: stable  anxiety: No  peripheral edema: No  Agents associated with hypertension: no associated agents  Compliance problems: no compliance problems    Review of Systems   Constitutional: Negative for malaise/fatigue.   Eyes:  Negative for blurred vision.   Cardiovascular:  Negative for chest pain, near-syncope, orthopnea, palpitations and syncope.   Respiratory:  Negative for shortness of breath.    Neurological:  Negative for dizziness, headaches and light-headedness.       The following portions of the patient's history were reviewed and updated as appropriate: allergies, current medications, past medical history, past social history, and problem list.      Current Outpatient Medications:     amLODIPine (NORVASC) 5 MG tablet, Take 2 tablets by mouth Daily., Disp: 180 tablet, Rfl: 1    Cholecalciferol (VITAMIN D3) 20 MCG (800 UNIT) tablet, Take 800 Int'l Units by mouth Daily., Disp: 75 tablet, Rfl:     Coenzyme Q10 (COQ-10) 100 MG capsule, Take 1 capsule by mouth Daily., Disp: , Rfl:     ferrous sulfate " "325 (65 FE) MG tablet, Take 1 tablet by mouth 4 (Four) Times a Week. Mondays Wednesdays Fridays Sundays, Disp: , Rfl:     Ibuprofen 3 %, Gabapentin 10 %, Baclofen 2 %, lidocaine 4 %, Apply 1-2 g topically to the appropriate area as directed 3 (Three) to 4 (Four) times daily., Disp: 90 g, Rfl: 2    Multiple Vitamin (MULTI VITAMIN DAILY PO), Take 1 tablet by mouth Daily., Disp: , Rfl:     olmesartan (BENICAR) 40 MG tablet, Take 1 tablet by mouth once daily, Disp: 90 tablet, Rfl: 2    Omega-3 Fatty Acids (FISH OIL) 1000 MG capsule capsule, Take 1 capsule by mouth Daily With Breakfast., Disp: , Rfl:     Pitavastatin Magnesium 1 MG tablet, Take 1 mg by mouth Daily., Disp: 90 tablet, Rfl: 2    torsemide (DEMADEX) 5 MG tablet, Take 1 tablet by mouth Daily., Disp: 90 tablet, Rfl: 2    vitamin B-12 (CYANOCOBALAMIN) 1000 MCG tablet, Take 1 tablet by mouth Daily., Disp: , Rfl:     Vitals:    01/31/25 1458   BP: 150/60   BP Location: Left arm   Patient Position: Sitting   Cuff Size: Adult   Pulse: 61   Temp: 97.9 °F (36.6 °C)   TempSrc: Infrared   SpO2: 98%   Weight: 117 kg (258 lb)   Height: 182.9 cm (72.01\")     Body mass index is 34.98 kg/m².    Recheck: 150/70  Recheck after rest: 156/72    Physical Exam  Vitals reviewed.   Constitutional:       General: He is not in acute distress.     Appearance: He is well-developed. He is obese. He is not ill-appearing or toxic-appearing.   HENT:      Head: Normocephalic and atraumatic.      Mouth/Throat:      Mouth: No oral lesions.      Tongue: No lesions.      Pharynx: No pharyngeal swelling or uvula swelling.   Eyes:      General: No scleral icterus.     Pupils: Pupils are equal, round, and reactive to light.   Neck:      Vascular: No carotid bruit.   Cardiovascular:      Rate and Rhythm: Normal rate and regular rhythm.      Pulses:           Carotid pulses are 2+ on the right side and 2+ on the left side.       Radial pulses are 2+ on the right side and 2+ on the left side.      " Heart sounds: Normal heart sounds.   Pulmonary:      Effort: Pulmonary effort is normal. No respiratory distress.      Breath sounds: Normal breath sounds. No wheezing, rhonchi or rales.   Musculoskeletal:      Cervical back: Normal range of motion and neck supple. No muscular tenderness.      Right lower leg: Edema (trace) present.      Left lower leg: Edema (trace) present.   Neurological:      Mental Status: He is alert and oriented to person, place, and time.      Cranial Nerves: No cranial nerve deficit, dysarthria or facial asymmetry.      Gait: Gait normal.   Psychiatric:         Attention and Perception: Attention normal.         Mood and Affect: Mood and affect normal.         Behavior: Behavior normal.         Thought Content: Thought content normal.         Assessment/ Plan  Diagnoses and all orders for this visit:    Uncontrolled hypertension  -     Basic Metabolic Panel  -     Cancel: Potassium    DIEGO (acute kidney injury)  -     Cancel: Potassium    Suspected sleep apnea    Obesity (BMI 30.0-34.9)    Essential hypertension    CKD stage 3b, GFR 30-44 ml/min        Return for Next scheduled follow up.      Discussion:    Dileep Hamilton is a 79 y.o. male with obesity, suspected sleep apnea (declines evaluation or Tx), chronic L hip pain managing with sports med RTC in short interval f/u:   1. Essential HTN, Renal duplex with normal RA B after past CR increase on thiazide diuretic trial; CKD IIIb - unontrolled last visit on home log on 3 medications, s/p increase torsemide from 2.5 mg to 5 mg daily.  Unfortunately, patient did not start home BP log so no home numbers available today.  Remains uncontrolled in office, but pressures progressive with rest and recheck/primarily systolic elevation, calling into question accuracy of numbers.  Additionally, patient unable to tell me if he is taking 5 or 10 mg of amlodipine daily.  Advised the following:   -C/W current medications, though confirm amlodipine dosing  with pillbox review at home and any NSAID use.    -Start home daily resting BP log, counseled on resting technique.    -Check BMP today to reassess potassium and renal tolerance of increased torsemide dosing.    -Will communicate with patient via phone in ~5 days to review labs, home log, and med dosing confirmation.    -Continue to address suspected sleep apnea element as due think likely driving element of uncontrolled pressure.  -Consider aldosterone assessment if potassium remains persistently elevated or progressive.  2. Colon CA, cecum - Stage I, s/p R colon resection in 12/5/2014.  C-scope clear 3/2018 with Dr. Sin --> repeat in 5 years, overdue.  Reviewed with patient again today, agrees to call and make appointment.  Declined referral last visit.  3. HM - RSV/ Tdap due, complete at pharmacy, reviewed again today.

## 2025-02-01 LAB
BUN SERPL-MCNC: 41 MG/DL (ref 8–23)
BUN/CREAT SERPL: 22.5 (ref 7–25)
CALCIUM SERPL-MCNC: 9.6 MG/DL (ref 8.6–10.5)
CHLORIDE SERPL-SCNC: 105 MMOL/L (ref 98–107)
CO2 SERPL-SCNC: 24.4 MMOL/L (ref 22–29)
CREAT SERPL-MCNC: 1.82 MG/DL (ref 0.76–1.27)
EGFRCR SERPLBLD CKD-EPI 2021: 37.8 ML/MIN/1.73
GLUCOSE SERPL-MCNC: 118 MG/DL (ref 65–99)
POTASSIUM SERPL-SCNC: 5.8 MMOL/L (ref 3.5–5.2)
SODIUM SERPL-SCNC: 140 MMOL/L (ref 136–145)

## 2025-02-07 DIAGNOSIS — I10 ESSENTIAL HYPERTENSION: Primary | ICD-10-CM

## 2025-02-07 DIAGNOSIS — N18.32 CKD STAGE 3B, GFR 30-44 ML/MIN: ICD-10-CM

## 2025-02-07 DIAGNOSIS — E87.5 HYPERKALEMIA: ICD-10-CM

## 2025-02-10 ENCOUNTER — LAB (OUTPATIENT)
Dept: LAB | Facility: HOSPITAL | Age: 80
End: 2025-02-10
Payer: MEDICARE

## 2025-02-10 DIAGNOSIS — E66.811 OBESITY (BMI 30.0-34.9): ICD-10-CM

## 2025-02-10 DIAGNOSIS — E87.5 HYPERKALEMIA: ICD-10-CM

## 2025-02-10 DIAGNOSIS — I10 ESSENTIAL HYPERTENSION: ICD-10-CM

## 2025-02-10 DIAGNOSIS — N18.32 CKD STAGE 3B, GFR 30-44 ML/MIN: Primary | ICD-10-CM

## 2025-02-10 DIAGNOSIS — N18.32 CKD STAGE 3B, GFR 30-44 ML/MIN: ICD-10-CM

## 2025-02-10 DIAGNOSIS — E11.9 CONTROLLED TYPE 2 DIABETES MELLITUS WITHOUT COMPLICATION, WITHOUT LONG-TERM CURRENT USE OF INSULIN: ICD-10-CM

## 2025-02-10 LAB
ANION GAP SERPL CALCULATED.3IONS-SCNC: 11.8 MMOL/L (ref 5–15)
BUN SERPL-MCNC: 39 MG/DL (ref 8–23)
BUN/CREAT SERPL: 22.3 (ref 7–25)
CALCIUM SPEC-SCNC: 9.3 MG/DL (ref 8.6–10.5)
CHLORIDE SERPL-SCNC: 103 MMOL/L (ref 98–107)
CO2 SERPL-SCNC: 25.2 MMOL/L (ref 22–29)
CREAT SERPL-MCNC: 1.75 MG/DL (ref 0.76–1.27)
EGFRCR SERPLBLD CKD-EPI 2021: 39.6 ML/MIN/1.73
GLUCOSE SERPL-MCNC: 117 MG/DL (ref 65–99)
POTASSIUM SERPL-SCNC: 4.8 MMOL/L (ref 3.5–5.2)
SODIUM SERPL-SCNC: 140 MMOL/L (ref 136–145)

## 2025-02-10 PROCEDURE — 80048 BASIC METABOLIC PNL TOTAL CA: CPT

## 2025-02-10 PROCEDURE — 36415 COLL VENOUS BLD VENIPUNCTURE: CPT

## 2025-02-10 RX ORDER — HYDROCHLOROTHIAZIDE 12.5 MG/1
12.5 TABLET ORAL DAILY
Qty: 90 TABLET | Refills: 1 | Status: SHIPPED | OUTPATIENT
Start: 2025-02-10

## 2025-02-14 ENCOUNTER — TELEPHONE (OUTPATIENT)
Dept: INTERNAL MEDICINE | Facility: CLINIC | Age: 80
End: 2025-02-14
Payer: MEDICARE

## 2025-02-14 DIAGNOSIS — I10 UNCONTROLLED HYPERTENSION: Primary | ICD-10-CM

## 2025-02-14 NOTE — TELEPHONE ENCOUNTER
Caller: Dileep Hamilton    Relationship: Self    Best call back number: [phone]    What orders are you requesting (i.e. lab or imaging): REPEAT LABS     In what timeframe would the patient need to come in: ASAP     Where will you receive your lab/imaging services: IN OFFICE     Additional notes:

## 2025-03-05 LAB
BUN SERPL-MCNC: 44 MG/DL (ref 8–23)
BUN/CREAT SERPL: 22.7 (ref 7–25)
CALCIUM SERPL-MCNC: 9.2 MG/DL (ref 8.6–10.5)
CHLORIDE SERPL-SCNC: 101 MMOL/L (ref 98–107)
CO2 SERPL-SCNC: 24.4 MMOL/L (ref 22–29)
CREAT SERPL-MCNC: 1.94 MG/DL (ref 0.76–1.27)
EGFRCR SERPLBLD CKD-EPI 2021: 35 ML/MIN/1.73
GLUCOSE SERPL-MCNC: 172 MG/DL (ref 65–99)
POTASSIUM SERPL-SCNC: 5.1 MMOL/L (ref 3.5–5.2)
SODIUM SERPL-SCNC: 138 MMOL/L (ref 136–145)

## 2025-03-16 ENCOUNTER — APPOINTMENT (OUTPATIENT)
Dept: GENERAL RADIOLOGY | Facility: HOSPITAL | Age: 80
DRG: 871 | End: 2025-03-16
Payer: MEDICARE

## 2025-03-16 ENCOUNTER — HOSPITAL ENCOUNTER (INPATIENT)
Facility: HOSPITAL | Age: 80
LOS: 2 days | Discharge: HOME | DRG: 871 | End: 2025-03-18
Attending: EMERGENCY MEDICINE | Admitting: STUDENT IN AN ORGANIZED HEALTH CARE EDUCATION/TRAINING PROGRAM
Payer: MEDICARE

## 2025-03-16 DIAGNOSIS — M48.061 LUMBAR FORAMINAL STENOSIS: ICD-10-CM

## 2025-03-16 DIAGNOSIS — R73.9 HYPERGLYCEMIA: ICD-10-CM

## 2025-03-16 DIAGNOSIS — D64.9 ANEMIA, UNSPECIFIED TYPE: ICD-10-CM

## 2025-03-16 DIAGNOSIS — R79.89 ELEVATED TROPONIN: ICD-10-CM

## 2025-03-16 DIAGNOSIS — N18.9 CHRONIC KIDNEY DISEASE, UNSPECIFIED CKD STAGE: ICD-10-CM

## 2025-03-16 DIAGNOSIS — A41.9 SEPSIS, DUE TO UNSPECIFIED ORGANISM, UNSPECIFIED WHETHER ACUTE ORGAN DYSFUNCTION PRESENT: Primary | ICD-10-CM

## 2025-03-16 DIAGNOSIS — J18.9 PNEUMONIA DUE TO INFECTIOUS ORGANISM, UNSPECIFIED LATERALITY, UNSPECIFIED PART OF LUNG: ICD-10-CM

## 2025-03-16 LAB
ALBUMIN SERPL-MCNC: 3.2 G/DL (ref 3.5–5.2)
ALBUMIN/GLOB SERPL: 0.8 G/DL
ALP SERPL-CCNC: 136 U/L (ref 39–117)
ALT SERPL W P-5'-P-CCNC: 65 U/L (ref 1–41)
ANION GAP SERPL CALCULATED.3IONS-SCNC: 15.3 MMOL/L (ref 5–15)
AST SERPL-CCNC: 44 U/L (ref 1–40)
B PARAPERT DNA SPEC QL NAA+PROBE: NOT DETECTED
B PERT DNA SPEC QL NAA+PROBE: NOT DETECTED
BASOPHILS # BLD AUTO: 0.05 10*3/MM3 (ref 0–0.2)
BASOPHILS NFR BLD AUTO: 0.2 % (ref 0–1.5)
BILIRUB SERPL-MCNC: 0.8 MG/DL (ref 0–1.2)
BUN SERPL-MCNC: 31 MG/DL (ref 8–23)
BUN/CREAT SERPL: 17 (ref 7–25)
C PNEUM DNA NPH QL NAA+NON-PROBE: NOT DETECTED
CALCIUM SPEC-SCNC: 8.9 MG/DL (ref 8.6–10.5)
CHLORIDE SERPL-SCNC: 98 MMOL/L (ref 98–107)
CO2 SERPL-SCNC: 22.7 MMOL/L (ref 22–29)
CREAT SERPL-MCNC: 1.82 MG/DL (ref 0.76–1.27)
D-LACTATE SERPL-SCNC: 1.7 MMOL/L (ref 0.5–2)
DEPRECATED RDW RBC AUTO: 43 FL (ref 37–54)
EGFRCR SERPLBLD CKD-EPI 2021: 37.8 ML/MIN/1.73
EOSINOPHIL # BLD AUTO: 0.03 10*3/MM3 (ref 0–0.4)
EOSINOPHIL NFR BLD AUTO: 0.1 % (ref 0.3–6.2)
ERYTHROCYTE [DISTWIDTH] IN BLOOD BY AUTOMATED COUNT: 12.4 % (ref 12.3–15.4)
FLUAV SUBTYP SPEC NAA+PROBE: NOT DETECTED
FLUBV RNA ISLT QL NAA+PROBE: NOT DETECTED
GEN 5 1HR TROPONIN T REFLEX: 57 NG/L
GLOBULIN UR ELPH-MCNC: 4 GM/DL
GLUCOSE SERPL-MCNC: 124 MG/DL (ref 65–99)
HADV DNA SPEC NAA+PROBE: NOT DETECTED
HCOV 229E RNA SPEC QL NAA+PROBE: NOT DETECTED
HCOV HKU1 RNA SPEC QL NAA+PROBE: NOT DETECTED
HCOV NL63 RNA SPEC QL NAA+PROBE: NOT DETECTED
HCOV OC43 RNA SPEC QL NAA+PROBE: NOT DETECTED
HCT VFR BLD AUTO: 33.9 % (ref 37.5–51)
HGB BLD-MCNC: 11.2 G/DL (ref 13–17.7)
HMPV RNA NPH QL NAA+NON-PROBE: NOT DETECTED
HPIV1 RNA ISLT QL NAA+PROBE: NOT DETECTED
HPIV2 RNA SPEC QL NAA+PROBE: NOT DETECTED
HPIV3 RNA NPH QL NAA+PROBE: NOT DETECTED
HPIV4 P GENE NPH QL NAA+PROBE: NOT DETECTED
IMM GRANULOCYTES # BLD AUTO: 0.12 10*3/MM3 (ref 0–0.05)
IMM GRANULOCYTES NFR BLD AUTO: 0.5 % (ref 0–0.5)
L PNEUMO1 AG UR QL IA: NEGATIVE
LYMPHOCYTES # BLD AUTO: 3.72 10*3/MM3 (ref 0.7–3.1)
LYMPHOCYTES NFR BLD AUTO: 16.6 % (ref 19.6–45.3)
M PNEUMO IGG SER IA-ACNC: NOT DETECTED
MAGNESIUM SERPL-MCNC: 1.9 MG/DL (ref 1.6–2.4)
MCH RBC QN AUTO: 31 PG (ref 26.6–33)
MCHC RBC AUTO-ENTMCNC: 33 G/DL (ref 31.5–35.7)
MCV RBC AUTO: 93.9 FL (ref 79–97)
MONOCYTES # BLD AUTO: 1.55 10*3/MM3 (ref 0.1–0.9)
MONOCYTES NFR BLD AUTO: 6.9 % (ref 5–12)
NEUTROPHILS NFR BLD AUTO: 16.88 10*3/MM3 (ref 1.7–7)
NEUTROPHILS NFR BLD AUTO: 75.7 % (ref 42.7–76)
NRBC BLD AUTO-RTO: 0 /100 WBC (ref 0–0.2)
NT-PROBNP SERPL-MCNC: 785 PG/ML (ref 0–1800)
PLATELET # BLD AUTO: 392 10*3/MM3 (ref 140–450)
PMV BLD AUTO: 9.2 FL (ref 6–12)
POTASSIUM SERPL-SCNC: 4.9 MMOL/L (ref 3.5–5.2)
PROCALCITONIN SERPL-MCNC: 0.36 NG/ML (ref 0–0.25)
PROT SERPL-MCNC: 7.2 G/DL (ref 6–8.5)
QT INTERVAL: 347 MS
QTC INTERVAL: 444 MS
RBC # BLD AUTO: 3.61 10*6/MM3 (ref 4.14–5.8)
RHINOVIRUS RNA SPEC NAA+PROBE: NOT DETECTED
RSV RNA NPH QL NAA+NON-PROBE: NOT DETECTED
S PNEUM AG SPEC QL LA: NEGATIVE
SARS-COV-2 RNA NPH QL NAA+NON-PROBE: NOT DETECTED
SODIUM SERPL-SCNC: 136 MMOL/L (ref 136–145)
TROPONIN T % DELTA: 4
TROPONIN T NUMERIC DELTA: 2 NG/L
TROPONIN T SERPL HS-MCNC: 55 NG/L
WBC NRBC COR # BLD AUTO: 22.35 10*3/MM3 (ref 3.4–10.8)

## 2025-03-16 PROCEDURE — 84145 PROCALCITONIN (PCT): CPT | Performed by: EMERGENCY MEDICINE

## 2025-03-16 PROCEDURE — 87449 NOS EACH ORGANISM AG IA: CPT | Performed by: STUDENT IN AN ORGANIZED HEALTH CARE EDUCATION/TRAINING PROGRAM

## 2025-03-16 PROCEDURE — 93010 ELECTROCARDIOGRAM REPORT: CPT | Performed by: STUDENT IN AN ORGANIZED HEALTH CARE EDUCATION/TRAINING PROGRAM

## 2025-03-16 PROCEDURE — 36415 COLL VENOUS BLD VENIPUNCTURE: CPT | Performed by: EMERGENCY MEDICINE

## 2025-03-16 PROCEDURE — 93005 ELECTROCARDIOGRAM TRACING: CPT | Performed by: EMERGENCY MEDICINE

## 2025-03-16 PROCEDURE — 84484 ASSAY OF TROPONIN QUANT: CPT | Performed by: EMERGENCY MEDICINE

## 2025-03-16 PROCEDURE — 99285 EMERGENCY DEPT VISIT HI MDM: CPT

## 2025-03-16 PROCEDURE — 85025 COMPLETE CBC W/AUTO DIFF WBC: CPT | Performed by: EMERGENCY MEDICINE

## 2025-03-16 PROCEDURE — 25810000003 SODIUM CHLORIDE 0.9 % SOLUTION: Performed by: EMERGENCY MEDICINE

## 2025-03-16 PROCEDURE — 80053 COMPREHEN METABOLIC PANEL: CPT | Performed by: EMERGENCY MEDICINE

## 2025-03-16 PROCEDURE — 71045 X-RAY EXAM CHEST 1 VIEW: CPT

## 2025-03-16 PROCEDURE — 25810000003 SODIUM CHLORIDE 0.9 % SOLUTION 250 ML FLEX CONT: Performed by: EMERGENCY MEDICINE

## 2025-03-16 PROCEDURE — 25010000002 AZITHROMYCIN PER 500 MG: Performed by: EMERGENCY MEDICINE

## 2025-03-16 PROCEDURE — 83880 ASSAY OF NATRIURETIC PEPTIDE: CPT | Performed by: EMERGENCY MEDICINE

## 2025-03-16 PROCEDURE — 25010000002 CEFTRIAXONE PER 250 MG: Performed by: EMERGENCY MEDICINE

## 2025-03-16 PROCEDURE — 25010000002 ENOXAPARIN PER 10 MG: Performed by: STUDENT IN AN ORGANIZED HEALTH CARE EDUCATION/TRAINING PROGRAM

## 2025-03-16 PROCEDURE — 99291 CRITICAL CARE FIRST HOUR: CPT

## 2025-03-16 PROCEDURE — 83605 ASSAY OF LACTIC ACID: CPT | Performed by: EMERGENCY MEDICINE

## 2025-03-16 PROCEDURE — 0202U NFCT DS 22 TRGT SARS-COV-2: CPT | Performed by: EMERGENCY MEDICINE

## 2025-03-16 PROCEDURE — 87040 BLOOD CULTURE FOR BACTERIA: CPT | Performed by: EMERGENCY MEDICINE

## 2025-03-16 PROCEDURE — 83735 ASSAY OF MAGNESIUM: CPT | Performed by: EMERGENCY MEDICINE

## 2025-03-16 RX ORDER — HYDROCHLOROTHIAZIDE 12.5 MG/1
12.5 TABLET ORAL DAILY
Status: DISCONTINUED | OUTPATIENT
Start: 2025-03-16 | End: 2025-03-18 | Stop reason: HOSPADM

## 2025-03-16 RX ORDER — MULTIVITAMIN WITH IRON
1000 TABLET ORAL DAILY
Status: DISCONTINUED | OUTPATIENT
Start: 2025-03-16 | End: 2025-03-18 | Stop reason: HOSPADM

## 2025-03-16 RX ORDER — ENOXAPARIN SODIUM 100 MG/ML
30 INJECTION SUBCUTANEOUS EVERY 24 HOURS
Status: DISCONTINUED | OUTPATIENT
Start: 2025-03-16 | End: 2025-03-17

## 2025-03-16 RX ORDER — TORSEMIDE 10 MG/1
5 TABLET ORAL DAILY
Status: DISCONTINUED | OUTPATIENT
Start: 2025-03-17 | End: 2025-03-18 | Stop reason: HOSPADM

## 2025-03-16 RX ORDER — BISACODYL 10 MG
10 SUPPOSITORY, RECTAL RECTAL DAILY PRN
Status: DISCONTINUED | OUTPATIENT
Start: 2025-03-16 | End: 2025-03-18 | Stop reason: HOSPADM

## 2025-03-16 RX ORDER — POLYETHYLENE GLYCOL 3350 17 G/17G
17 POWDER, FOR SOLUTION ORAL DAILY PRN
Status: DISCONTINUED | OUTPATIENT
Start: 2025-03-16 | End: 2025-03-18 | Stop reason: HOSPADM

## 2025-03-16 RX ORDER — FERROUS SULFATE 325(65) MG
325 TABLET ORAL
Status: DISCONTINUED | OUTPATIENT
Start: 2025-03-16 | End: 2025-03-18 | Stop reason: HOSPADM

## 2025-03-16 RX ORDER — ONDANSETRON 2 MG/ML
4 INJECTION INTRAMUSCULAR; INTRAVENOUS EVERY 6 HOURS PRN
Status: DISCONTINUED | OUTPATIENT
Start: 2025-03-16 | End: 2025-03-18 | Stop reason: HOSPADM

## 2025-03-16 RX ORDER — ONDANSETRON 4 MG/1
4 TABLET, ORALLY DISINTEGRATING ORAL EVERY 6 HOURS PRN
Status: DISCONTINUED | OUTPATIENT
Start: 2025-03-16 | End: 2025-03-18 | Stop reason: HOSPADM

## 2025-03-16 RX ORDER — LOSARTAN POTASSIUM 50 MG/1
100 TABLET ORAL
Status: DISCONTINUED | OUTPATIENT
Start: 2025-03-16 | End: 2025-03-18 | Stop reason: HOSPADM

## 2025-03-16 RX ORDER — BISACODYL 5 MG/1
5 TABLET, DELAYED RELEASE ORAL DAILY PRN
Status: DISCONTINUED | OUTPATIENT
Start: 2025-03-16 | End: 2025-03-18 | Stop reason: HOSPADM

## 2025-03-16 RX ORDER — DIPHENOXYLATE HYDROCHLORIDE AND ATROPINE SULFATE 2.5; .025 MG/1; MG/1
1 TABLET ORAL DAILY
Status: DISCONTINUED | OUTPATIENT
Start: 2025-03-16 | End: 2025-03-18 | Stop reason: HOSPADM

## 2025-03-16 RX ORDER — AMOXICILLIN 250 MG
2 CAPSULE ORAL 2 TIMES DAILY PRN
Status: DISCONTINUED | OUTPATIENT
Start: 2025-03-16 | End: 2025-03-18 | Stop reason: HOSPADM

## 2025-03-16 RX ORDER — AMLODIPINE BESYLATE 5 MG/1
10 TABLET ORAL DAILY
Status: DISCONTINUED | OUTPATIENT
Start: 2025-03-17 | End: 2025-03-18 | Stop reason: HOSPADM

## 2025-03-16 RX ADMIN — AZITHROMYCIN MONOHYDRATE 500 MG: 500 INJECTION, POWDER, LYOPHILIZED, FOR SOLUTION INTRAVENOUS at 12:39

## 2025-03-16 RX ADMIN — FERROUS SULFATE TAB 325 MG (65 MG ELEMENTAL FE) 325 MG: 325 (65 FE) TAB at 16:52

## 2025-03-16 RX ADMIN — SODIUM CHLORIDE 1000 ML: 9 INJECTION, SOLUTION INTRAVENOUS at 10:24

## 2025-03-16 RX ADMIN — THERA TABS 1 TABLET: TAB at 16:52

## 2025-03-16 RX ADMIN — Medication 1000 MCG: at 16:52

## 2025-03-16 RX ADMIN — SODIUM CHLORIDE 2000 MG: 9 INJECTION, SOLUTION INTRAVENOUS at 11:42

## 2025-03-16 RX ADMIN — HYDROCHLOROTHIAZIDE 12.5 MG: 12.5 TABLET ORAL at 16:52

## 2025-03-16 RX ADMIN — ENOXAPARIN SODIUM 30 MG: 100 INJECTION SUBCUTANEOUS at 21:16

## 2025-03-16 NOTE — ED NOTES
"Nursing report ED to floor  Dileep Hamilton  79 y.o.  male    HPI :  HPI  Stated Reason for Visit: soa, fever  History Obtained From: EMS  Duration (Weeks): 1    Chief Complaint  Chief Complaint   Patient presents with    Shortness of Breath    Fever       Admitting doctor:   Barrington Marinelli MD    Admitting diagnosis:   The primary encounter diagnosis was Sepsis, due to unspecified organism, unspecified whether acute organ dysfunction present. Diagnoses of Pneumonia due to infectious organism, unspecified laterality, unspecified part of lung, Chronic kidney disease, unspecified CKD stage, Hyperglycemia, Elevated troponin, and Anemia, unspecified type were also pertinent to this visit.    Code status:   Current Code Status       Date Active Code Status Order ID Comments User Context       3/16/2025 1109 CPR (Attempt to Resuscitate) [id number]  Barrington Marinelli MD ED        Question Answer    Code Status (Patient has no pulse and is not breathing) CPR (Attempt to Resuscitate)    Medical Interventions (Patient has pulse or is breathing) Full    Level Of Support Discussed With Patient                    Allergies:   Statins    Isolation:   No active isolations    Intake and Output  No intake or output data in the 24 hours ending 03/16/25 1146    Weight:       03/16/25  0924   Weight: 120 kg (264 lb 1.8 oz)       Most recent vitals:   Vitals:    03/16/25 0924 03/16/25 0927 03/16/25 1026 03/16/25 1145   BP:   156/67    Pulse:   90 93   Resp:       Temp:       SpO2:   98% 93%   Weight: 120 kg (264 lb 1.8 oz)      Height:  182.9 cm (72.01\")         Active LDAs/IV Access:   Lines, Drains & Airways       Active LDAs       Name Placement date Placement time Site Days    Peripheral IV Right Antecubital --  --  Antecubital  --                    Labs (abnormal labs have a star):   Labs Reviewed   COMPREHENSIVE METABOLIC PANEL - Abnormal; Notable for the following components:       Result Value    Glucose 124 (*)     BUN " 31 (*)     Creatinine 1.82 (*)     Albumin 3.2 (*)     ALT (SGPT) 65 (*)     AST (SGOT) 44 (*)     Alkaline Phosphatase 136 (*)     Anion Gap 15.3 (*)     eGFR 37.8 (*)     All other components within normal limits    Narrative:     GFR Categories in Chronic Kidney Disease (CKD)      GFR Category          GFR (mL/min/1.73)    Interpretation  G1                     90 or greater         Normal or high (1)  G2                      60-89                Mild decrease (1)  G3a                   45-59                Mild to moderate decrease  G3b                   30-44                Moderate to severe decrease  G4                    15-29                Severe decrease  G5                    14 or less           Kidney failure          (1)In the absence of evidence of kidney disease, neither GFR category G1 or G2 fulfill the criteria for CKD.    eGFR calculation 2021 CKD-EPI creatinine equation, which does not include race as a factor   TROPONIN - Abnormal; Notable for the following components:    HS Troponin T 55 (*)     All other components within normal limits    Narrative:     High Sensitive Troponin T Reference Range:  <14.0 ng/L- Negative Female for AMI  <22.0 ng/L- Negative Male for AMI  >=14 - Abnormal Female indicating possible myocardial injury.  >=22 - Abnormal Male indicating possible myocardial injury.   Clinicians would have to utilize clinical acumen, EKG, Troponin, and serial changes to determine if it is an Acute Myocardial Infarction or myocardial injury due to an underlying chronic condition.        PROCALCITONIN - Abnormal; Notable for the following components:    Procalcitonin 0.36 (*)     All other components within normal limits    Narrative:     As a Marker for Sepsis (Non-Neonates):    1. <0.5 ng/mL represents a low risk of severe sepsis and/or septic shock.  2. >2 ng/mL represents a high risk of severe sepsis and/or septic shock.    As a Marker for Lower Respiratory Tract Infections that  "require antibiotic therapy:    PCT on Admission    Antibiotic Therapy       6-12 Hrs later    >0.5                Strongly Recommended  >0.25 - <0.5        Recommended   0.1 - 0.25          Discouraged              Remeasure/reassess PCT  <0.1                Strongly Discouraged     Remeasure/reassess PCT    As 28 day mortality risk marker: \"Change in Procalcitonin Result\" (>80% or <=80%) if Day 0 (or Day 1) and Day 4 values are available. Refer to [email or web address]-[email or web address]-[email or web address]    Change in PCT <=80%  A decrease of PCT levels below or equal to 80% defines a positive change in PCT test result representing a higher risk for 28-day all-cause mortality of patients diagnosed with severe sepsis for septic shock.    Change in PCT >80%  A decrease of PCT levels of more than 80% defines a negative change in PCT result representing a lower risk for 28-day all-cause mortality of patients diagnosed with severe sepsis or septic shock.      CBC WITH AUTO DIFFERENTIAL - Abnormal; Notable for the following components:    WBC 22.35 (*)     RBC 3.61 (*)     Hemoglobin 11.2 (*)     Hematocrit 33.9 (*)     Lymphocyte % 16.6 (*)     Eosinophil % 0.1 (*)     Neutrophils, Absolute 16.88 (*)     Lymphocytes, Absolute 3.72 (*)     Monocytes, Absolute 1.55 (*)     Immature Grans, Absolute 0.12 (*)     All other components within normal limits   HIGH SENSITIVITIY TROPONIN T 1HR - Abnormal; Notable for the following components:    HS Troponin T 57 (*)     All other components within normal limits    Narrative:     High Sensitive Troponin T Reference Range:  <14.0 ng/L- Negative Female for AMI  <22.0 ng/L- Negative Male for AMI  >=14 - Abnormal Female indicating possible myocardial injury.  >=22 - Abnormal Male indicating possible myocardial injury.   Clinicians would have to utilize clinical acumen, EKG, Troponin, and serial changes to determine if it is an Acute Myocardial Infarction or myocardial injury due to an underlying chronic " condition.        RESPIRATORY PANEL PCR W/ COVID-19 (SARS-COV-2), NP SWAB IN UTM/VTP, 2 HR TAT - Normal    Narrative:     In the setting of a positive respiratory panel with a viral infection PLUS a negative procalcitonin without other underlying concern for bacterial infection, consider observing off antibiotics or discontinuation of antibiotics and continue supportive care. If the respiratory panel is positive for atypical bacterial infection (Bordetella pertussis, Chlamydophila pneumoniae, or Mycoplasma pneumoniae), consider antibiotic de-escalation to target atypical bacterial infection.   BNP (IN-HOUSE) - Normal    Narrative:     This assay is used as an aid in the diagnosis of individuals suspected of having heart failure. It can be used as an aid in the diagnosis of acute decompensated heart failure (ADHF) in patients presenting with signs and symptoms of ADHF to the emergency department (ED). In addition, NT-proBNP of <300 pg/mL indicates ADHF is not likely.    Age Range Result Interpretation  NT-proBNP Concentration (pg/mL:      <50             Positive            >450                   Gray                 300-450                    Negative             <300    50-75           Positive            >900                  Gray                300-900                  Negative            <300      >75             Positive            >1800                  Gray                300-1800                  Negative            <300   MAGNESIUM - Normal   LACTIC ACID, PLASMA - Normal   BLOOD CULTURE   BLOOD CULTURE   STREP PNEUMO AG, URINE OR CSF   LEGIONELLA ANTIGEN, URINE   CBC AND DIFFERENTIAL    Narrative:     The following orders were created for panel order CBC & Differential.  Procedure                               Abnormality         Status                     ---------                               -----------         ------                     CBC Auto Differential[[id number]]        Abnormal            Final  result                 Please view results for these tests on the individual orders.       EKG:   ECG 12 Lead Dyspnea   Preliminary Result   HEART RATE=98  bpm   RR Mktinilx=239  ms   IN Uluuslej=807  ms   P Horizontal Axis=  deg   P Front Axis=76  deg   QRSD Bgwhrspf=530  ms   QT Lstavfsg=218  ms   DCkK=690  ms   QRS Axis=117  deg   T Wave Axis=63  deg   - ABNORMAL ECG -   Sinus rhythm   Nonspecific intraventricular conduction delay   Abnrm R prog, consider ASMI or lead placement   Date and Time of Study:2025-03-16 09:19:25          Meds given in ED:   Medications   azithromycin (ZITHROMAX) 500 mg in sodium chloride 0.9 % 250 mL IVPB-VTB (has no administration in time range)   cefTRIAXone (ROCEPHIN) 2,000 mg in sodium chloride 0.9 % 100 mL MBP (2,000 mg Intravenous New Bag 3/16/25 1142)   sodium chloride 0.9 % bolus 1,000 mL (1,000 mL Intravenous New Bag 3/16/25 1024)   ondansetron ODT (ZOFRAN-ODT) disintegrating tablet 4 mg (has no administration in time range)     Or   ondansetron (ZOFRAN) injection 4 mg (has no administration in time range)   sennosides-docusate (PERICOLACE) 8.6-50 MG per tablet 2 tablet (has no administration in time range)     And   polyethylene glycol (MIRALAX) packet 17 g (has no administration in time range)     And   bisacodyl (DULCOLAX) EC tablet 5 mg (has no administration in time range)     And   bisacodyl (DULCOLAX) suppository 10 mg (has no administration in time range)   cefTRIAXone (ROCEPHIN) 1,000 mg in sodium chloride 0.9 % 100 mL MBP (has no administration in time range)       Imaging results:  XR Chest 1 View  Result Date: 3/16/2025   1. Linear right perihilar and bibasilar opacities are most consistent with subsegmental atelectasis. 2. Findings of old granulomatous infection.  This report was finalized on 3/16/2025 9:38 AM by Dr. Po Perez M.D on Workstation: [id number]        Ambulatory status:   - up with assistance     Social issues:   Social History      Socioeconomic History    Marital status:      Spouse name: Tana    Number of children: 1   Tobacco Use    Smoking status: Never    Smokeless tobacco: Never   Vaping Use    Vaping status: Never Used   Substance and Sexual Activity    Alcohol use: No    Drug use: No    Sexual activity: Yes     Partners: Female     Birth control/protection: None, Vasectomy     Comment: wife only; no hx STD's       Peripheral Neurovascular  Peripheral Neurovascular (Adult)  Peripheral Neurovascular WDL: .WDL except, neurovascular assessment lower, pulse assessment  Pulse Assessment: dorsalis pedis  LLE Neurovascular Assessment  Sensation LLE: tenderness present (Patient reports BLE tenderness to touch.)  RLE Neurovascular Assessment  Sensation RLE: tenderness present    Neuro Cognitive  Neuro Cognitive (Adult)  Cognitive/Neuro/Behavioral WDL: WDL, all  Level of Consciousness: Alert  Orientation: oriented x 4    Learning  Learning Assessment  Learning Readiness and Ability: no barriers identified  Education Provided  Person Taught: patient  Teaching Method: verbal instruction  Teaching Focus: symptom/problem overview, medical device/equipment use    Respiratory  Respiratory  Airway WDL: WDL  Additional Documentation: Breath Sounds (Group)  Respiratory WDL  Respiratory WDL: .WDL except, all, cough  Rhythm/Pattern, Respiratory: shortness of breath, labored  Cough Frequency: incessant  Cough Type: productive  Breath Sounds  All Lung Fields Breath Sounds: All Fields  All Lung Fields Breath Sounds: Anterior:, Posterior:, diminished, wheezes, expiratory, wheezes, inspiratory    Abdominal Pain  Safety Interventions  Safety Precautions/Falls Reduction: assistive device/personal items within reach, family at bedside, toileting offered    Pain Assessments  Pain (Adult)  (0-10) Pain Rating: Rest: 4  (0-10) Pain Rating: Activity: 4  Pain Location: foot  Pain Side/Orientation: bilateral    NIH Stroke Scale       Carissa Yeung  RN  03/16/25 11:46 EDT

## 2025-03-16 NOTE — H&P
Patient Name:  Dileep Hamilton  YOB: 1945  MRN:  [id number]  Admit Date:  3/16/2025  Patient Care Team:  Juan A Rubi MD as PCP - General  Juan A Rubi MD as PCP - Family Medicine  Crys Colón MD (Inactive) as Consulting Physician (Ophthalmology)  Ruth Salter MD as Consulting Physician (Hematology and Oncology)  Juan A Rubi MD as Referring Physician (Internal Medicine)      Subjective   History Present Illness     Chief Complaint   Patient presents with    Shortness of Breath    Fever       Mr. Hamilton is a 79 y.o. male with HTN, CKD, pre-DM presenting with general malaise.  Patient states that for roughly the past week he has had cough, congestion, muscle aches, chills.  He has been watching his grandchildren and notes that they were recently sick with upper respiratory like symptoms.  He has had cough productive of green-colored sputum.  Is felt weaker compared to his baseline as well.  No chest pain, no syncope.      Review of Systems   Constitutional:  Positive for chills and fatigue.   HENT:  Positive for congestion.    Respiratory:  Positive for cough and shortness of breath.    Cardiovascular:  Negative for chest pain and palpitations.   Gastrointestinal:  Negative for constipation, diarrhea, nausea and vomiting.   Genitourinary:  Negative for dysuria and hematuria.   Neurological:  Negative for weakness and headaches.        Personal History     Past Medical History:   Diagnosis Date    Arthritis Hips    Benign prostatic hyperplasia 2    Borderline diabetes     Bursitis of hip 09/2002    Cataract Right eye 2020    Colon cancer     T1N0 stage I, s/p partial R colon resection 2014    Diabetes type 2, controlled 01/26/2016    Family history of blood clots     Heart murmur 2016    Hernia Abdomen    Hip arthrosis 09/2002    History of cataract     B early 2014    History of shingles     2013    Hyperlipidemia     Hypertension     dx'd 8/2014    IFG (impaired fasting  glucose)     Leukocytosis     Lumbar radiculitis 12/27/2022    Lung mass 11/12/2015    chronic per pt, distant imaging     Obesity     Personal history of scoliosis     mild    Proteinuria     Rotator cuff syndrome     Scoliosis Lower back    Shingles outbreak 10/15/2017    Squamous cell carcinoma     skin    Stasis dermatitis     Trochanteric bursitis of left hip 03/24/2023    Formatting of this note might be different from the original.  Added automatically from request for surgery [id number]      Visual impairment Cataracts    Vitamin D deficiency      Past Surgical History:   Procedure Laterality Date    BURSECTOMY Left 04/19/2023    CATARACT EXTRACTION Right 05/2020    CATARACT EXTRACTION Left 11/02/2022    COLON SURGERY  12/05/2013    COLONOSCOPY  2018    EPIDURAL Left 12/29/2022    Procedure: LUMBAR/SACRAL TRANSFORAMINAL EPIDURAL Left L4-5;  Surgeon: Tori Solitario MD;  [place]: [facility] [place];  Service: Pain Management;  Laterality: Left;    SUBTOTAL COLECTOMY  12/05/2014    colon ca 2014; R side of colon only    VASECTOMY      1978     Family History   Problem Relation Age of Onset    Heart disease Mother     Diabetes Mother     Hypertension Mother     Lung cancer Mother     COPD Mother         `    Asthma Mother     Anesthesia problems Mother     Arthritis Mother     Arthritis Father     Stroke Father     Diabetes Father     Heart disease Father     Diabetes type II Sister     Diabetes type II Brother     No Known Problems Son     Diabetes Maternal Grandmother     Clotting disorder Other         Family history of blood clots     Social History     Tobacco Use    Smoking status: Never    Smokeless tobacco: Never   Vaping Use    Vaping status: Never Used   Substance Use Topics    Alcohol use: No    Drug use: No     No current facility-administered medications on file prior to encounter.     Current Outpatient Medications on File Prior to Encounter   Medication Sig Dispense Refill    amLODIPine (NORVASC) 5 MG  tablet Take 2 tablets by mouth Daily. 180 tablet 1    Cholecalciferol (VITAMIN D3) 20 MCG (800 UNIT) tablet Take 800 Int'l Units by mouth Daily. 75 tablet     Coenzyme Q10 (COQ-10) 100 MG capsule Take 1 capsule by mouth Daily.      hydroCHLOROthiazide 12.5 MG tablet Take 1 tablet by mouth Daily. 90 tablet 1    olmesartan (BENICAR) 40 MG tablet Take 1 tablet by mouth once daily 90 tablet 2    torsemide (DEMADEX) 5 MG tablet Take 1 tablet by mouth Daily. 90 tablet 2    ferrous sulfate 325 (65 FE) MG tablet Take 1 tablet by mouth 4 (Four) Times a Week. Mondays Wednesdays Fridays Sundays      Ibuprofen 3 %, Gabapentin 10 %, Baclofen 2 %, lidocaine 4 % Apply 1-2 g topically to the appropriate area as directed 3 (Three) to 4 (Four) times daily. 90 g 2    Multiple Vitamin (MULTI VITAMIN DAILY PO) Take 1 tablet by mouth Daily.      Omega-3 Fatty Acids (FISH OIL) 1000 MG capsule capsule Take 1 capsule by mouth Daily With Breakfast.      Pitavastatin Magnesium 1 MG tablet Take 1 mg by mouth Daily. 90 tablet 2    vitamin B-12 (CYANOCOBALAMIN) 1000 MCG tablet Take 1 tablet by mouth Daily.       Allergies   Allergen Reactions    Statins        Objective    Objective     Vital Signs  Temp:  [99.7 °F (37.6 °C)] 99.7 °F (37.6 °C)  Heart Rate:  [88-93] 88  Resp:  [20] 20  BP: (133-156)/(62-70) 144/70  SpO2:  [93 %-98 %] 93 %  on   ;   Device (Oxygen Therapy): room air  Body mass index is 35.81 kg/m².    Physical Exam  Constitutional:       Appearance: He is ill-appearing.   Pulmonary:      Effort: Pulmonary effort is normal. No respiratory distress.      Breath sounds: No stridor. Wheezing and rhonchi present.   Abdominal:      General: Abdomen is flat. There is no distension.   Musculoskeletal:      Right lower leg: Edema present.      Left lower leg: Edema present.   Skin:     Coloration: Skin is not pale.   Neurological:      Mental Status: He is alert and oriented to person, place, and time.         Results Review:    I have  personally reviewed:  [x]  Laboratory  [x]  Old records  [x]  Radiology   [x]  EKG/Telemetry   [x]  Microbiology    [x]  Cardiology/Vascular   []  Pathology     Lab Results (last 24 hours)       Procedure Component Value Units Date/Time    CBC & Differential [[id number]]  (Abnormal) Collected: 03/16/25 0921    Specimen: Blood Updated: 03/16/25 0931    Narrative:      The following orders were created for panel order CBC & Differential.  Procedure                               Abnormality         Status                     ---------                               -----------         ------                     CBC Auto Differential[[id number]]        Abnormal            Final result                 Please view results for these tests on the individual orders.    Comprehensive Metabolic Panel [[id number]]  (Abnormal) Collected: 03/16/25 0921    Specimen: Blood Updated: 03/16/25 0953     Glucose 124 mg/dL      BUN 31 mg/dL      Creatinine 1.82 mg/dL      Sodium 136 mmol/L      Potassium 4.9 mmol/L      Chloride 98 mmol/L      CO2 22.7 mmol/L      Calcium 8.9 mg/dL      Total Protein 7.2 g/dL      Albumin 3.2 g/dL      ALT (SGPT) 65 U/L      AST (SGOT) 44 U/L      Alkaline Phosphatase 136 U/L      Total Bilirubin 0.8 mg/dL      Globulin 4.0 gm/dL      A/G Ratio 0.8 g/dL      BUN/Creatinine Ratio 17.0     Anion Gap 15.3 mmol/L      eGFR 37.8 mL/min/1.73     Narrative:      GFR Categories in Chronic Kidney Disease (CKD)      GFR Category          GFR (mL/min/1.73)    Interpretation  G1                     90 or greater         Normal or high (1)  G2                      60-89                Mild decrease (1)  G3a                   45-59                Mild to moderate decrease  G3b                   30-44                Moderate to severe decrease  G4                    15-29                Severe decrease  G5                    14 or less           Kidney failure          (1)In the absence of evidence of kidney disease,  neither GFR category G1 or G2 fulfill the criteria for CKD.    eGFR calculation 2021 CKD-EPI creatinine equation, which does not include race as a factor    High Sensitivity Troponin T [[id number]]  (Abnormal) Collected: 03/16/25 0921    Specimen: Blood Updated: 03/16/25 1000     HS Troponin T 55 ng/L     Narrative:      High Sensitive Troponin T Reference Range:  <14.0 ng/L- Negative Female for AMI  <22.0 ng/L- Negative Male for AMI  >=14 - Abnormal Female indicating possible myocardial injury.  >=22 - Abnormal Male indicating possible myocardial injury.   Clinicians would have to utilize clinical acumen, EKG, Troponin, and serial changes to determine if it is an Acute Myocardial Infarction or myocardial injury due to an underlying chronic condition.         BNP [[id number]]  (Normal) Collected: 03/16/25 0921    Specimen: Blood Updated: 03/16/25 1000     proBNP 785.0 pg/mL     Narrative:      This assay is used as an aid in the diagnosis of individuals suspected of having heart failure. It can be used as an aid in the diagnosis of acute decompensated heart failure (ADHF) in patients presenting with signs and symptoms of ADHF to the emergency department (ED). In addition, NT-proBNP of <300 pg/mL indicates ADHF is not likely.    Age Range Result Interpretation  NT-proBNP Concentration (pg/mL:      <50             Positive            >450                   Gray                 300-450                    Negative             <300    50-75           Positive            >900                  Gray                300-900                  Negative            <300      >75             Positive            >1800                  Gray                300-1800                  Negative            <300    Magnesium [[id number]]  (Normal) Collected: 03/16/25 0921    Specimen: Blood Updated: 03/16/25 0953     Magnesium 1.9 mg/dL     Procalcitonin [[id number]]  (Abnormal) Collected: 03/16/25 0921    Specimen: Blood Updated: 03/16/25 1000  "    Procalcitonin 0.36 ng/mL     Narrative:      As a Marker for Sepsis (Non-Neonates):    1. <0.5 ng/mL represents a low risk of severe sepsis and/or septic shock.  2. >2 ng/mL represents a high risk of severe sepsis and/or septic shock.    As a Marker for Lower Respiratory Tract Infections that require antibiotic therapy:    PCT on Admission    Antibiotic Therapy       6-12 Hrs later    >0.5                Strongly Recommended  >0.25 - <0.5        Recommended   0.1 - 0.25          Discouraged              Remeasure/reassess PCT  <0.1                Strongly Discouraged     Remeasure/reassess PCT    As 28 day mortality risk marker: \"Change in Procalcitonin Result\" (>80% or <=80%) if Day 0 (or Day 1) and Day 4 values are available. Refer to [email or web address]    Change in PCT <=80%  A decrease of PCT levels below or equal to 80% defines a positive change in PCT test result representing a higher risk for 28-day all-cause mortality of patients diagnosed with severe sepsis for septic shock.    Change in PCT >80%  A decrease of PCT levels of more than 80% defines a negative change in PCT result representing a lower risk for 28-day all-cause mortality of patients diagnosed with severe sepsis or septic shock.       CBC Auto Differential [[id number]]  (Abnormal) Collected: 03/16/25 0921    Specimen: Blood Updated: 03/16/25 0931     WBC 22.35 10*3/mm3      RBC 3.61 10*6/mm3      Hemoglobin 11.2 g/dL      Hematocrit 33.9 %      MCV 93.9 fL      MCH 31.0 pg      MCHC 33.0 g/dL      RDW 12.4 %      RDW-SD 43.0 fl      MPV 9.2 fL      Platelets 392 10*3/mm3      Neutrophil % 75.7 %      Lymphocyte % 16.6 %      Monocyte % 6.9 %      Eosinophil % 0.1 %      Basophil % 0.2 %      Immature Grans % 0.5 %      Neutrophils, Absolute 16.88 10*3/mm3      Lymphocytes, Absolute 3.72 10*3/mm3      Monocytes, Absolute 1.55 10*3/mm3      Eosinophils, Absolute 0.03 10*3/mm3      Basophils, Absolute 0.05 10*3/mm3      " Immature Grans, Absolute 0.12 10*3/mm3      nRBC 0.0 /100 WBC     Respiratory Panel PCR w/COVID-19(SARS-CoV-2) DONA/MIGUEL/CONNER/PAD/COR/ILIANA In-House, NP Swab in UTM/VTM, 2 HR TAT - Swab, Nasopharynx [[id number]]  (Normal) Collected: 03/16/25 0922    Specimen: Swab from Nasopharynx Updated: 03/16/25 1027     ADENOVIRUS, PCR Not Detected     Coronavirus 229E Not Detected     Coronavirus HKU1 Not Detected     Coronavirus NL63 Not Detected     Coronavirus OC43 Not Detected     COVID19 Not Detected     Human Metapneumovirus Not Detected     Human Rhinovirus/Enterovirus Not Detected     Influenza A PCR Not Detected     Influenza B PCR Not Detected     Parainfluenza Virus 1 Not Detected     Parainfluenza Virus 2 Not Detected     Parainfluenza Virus 3 Not Detected     Parainfluenza Virus 4 Not Detected     RSV, PCR Not Detected     Bordetella pertussis pcr Not Detected     Bordetella parapertussis PCR Not Detected     Chlamydophila pneumoniae PCR Not Detected     Mycoplasma pneumo by PCR Not Detected    Narrative:      In the setting of a positive respiratory panel with a viral infection PLUS a negative procalcitonin without other underlying concern for bacterial infection, consider observing off antibiotics or discontinuation of antibiotics and continue supportive care. If the respiratory panel is positive for atypical bacterial infection (Bordetella pertussis, Chlamydophila pneumoniae, or Mycoplasma pneumoniae), consider antibiotic de-escalation to target atypical bacterial infection.    High Sensitivity Troponin T 1Hr [[id number]]  (Abnormal) Collected: 03/16/25 1026    Specimen: Blood Updated: 03/16/25 1114     HS Troponin T 57 ng/L      Troponin T Numeric Delta 2 ng/L      Troponin T % Delta 4    Narrative:      High Sensitive Troponin T Reference Range:  <14.0 ng/L- Negative Female for AMI  <22.0 ng/L- Negative Male for AMI  >=14 - Abnormal Female indicating possible myocardial injury.  >=22 - Abnormal Male indicating  possible myocardial injury.   Clinicians would have to utilize clinical acumen, EKG, Troponin, and serial changes to determine if it is an Acute Myocardial Infarction or myocardial injury due to an underlying chronic condition.         Lactic Acid, Plasma [[id number]]  (Normal) Collected: 03/16/25 1026    Specimen: Blood Updated: 03/16/25 1110     Lactate 1.7 mmol/L     Blood Culture - Blood, Arm, Right [[id number]] Collected: 03/16/25 1122    Specimen: Blood from Arm, Right Updated: 03/16/25 1140    Blood Culture - Blood, Arm, Left [[id number]] Collected: 03/16/25 1135    Specimen: Blood from Arm, Left Updated: 03/16/25 1140            Imaging Results (Last 24 Hours)       Procedure Component Value Units Date/Time    XR Chest 1 View [[id number]] Collected: 03/16/25 0936     Updated: 03/16/25 0941    Narrative:      XR CHEST 1 VW-        INDICATION: Shortness of air     COMPARISON: None     TECHNIQUE: 1 view chest     FINDINGS:      Calcified pulmonary nodules and calcified mediastinal and hilar lymph  nodes, consistent with prior granulomatous infection. Linear right  perihilar and bibasilar opacities. No effusions. Cardiac silhouette is  at upper limits in size for AP technique.       Impression:         1. Linear right perihilar and bibasilar opacities are most consistent  with subsegmental atelectasis.  2. Findings of old granulomatous infection.     This report was finalized on 3/16/2025 9:38 AM by Dr. Po Perez M.D on Workstation: [id number]               Results for orders placed in visit on 03/03/16    Adult transthoracic echo complete with contrast    Interpretation Summary  · Left ventricular function is normal. Calculated EF = 75%. Estimated EF was in agreement with the calculated EF. Normal left ventricular cavity size noted. All left ventricular wall segments contract normally. Left ventricular wall thickness is consistent with mild concentric hypertrophy. Left ventricular diastolic dysfunction  (grade II) consistent with pseudonormalization.  · There is calcification of the aortic valve present. No aortic valve regurgitation is present. No aortic valve stenosis is present.  · Moderate mitral annular calcification is present. No mitral valve regurgitation is present.      ECG 12 Lead Dyspnea   Preliminary Result   HEART RATE=98  bpm   RR Hzehgtkb=485  ms   IL Zykomzbk=221  ms   P Horizontal Axis=  deg   P Front Axis=76  deg   QRSD Ethhtrof=793  ms   QT Capjzkpk=457  ms   LLjD=296  ms   QRS Axis=117  deg   T Wave Axis=63  deg   - ABNORMAL ECG -   Sinus rhythm   Nonspecific intraventricular conduction delay   Abnrm R prog, consider ASMI or lead placement   Date and Time of Study:2025-03-16 09:19:25           Assessment/Plan     Active Hospital Problems    Diagnosis  POA    **Sepsis [A41.9]  Yes    CKD stage 3b, GFR 30-44 ml/min [N18.32]  Yes    Benign prostatic hyperplasia without lower urinary tract symptoms [N40.0]  Yes    Essential hypertension [I10]  Yes      Resolved Hospital Problems   No resolved problems to display.       Mr. Hamilton is a 79 y.o. male with HTN, CKD, pre-DM presenting with general malaise.      Multifocal PNA  Weakness  -WBC 22, procal 0.36  -RVP negative  -CXR w/ right perihilar and bibasilar opacities  -cx pending  -urine Lg, strep negative  -Received azithromycin and Rocephin in ED  -atypicals negative, dc azithromycin; cont Rocephin  -PT eval.    Elevated troponin  -trop 55 > 57; EKG no acute ischemia   -last TTE was in 2016, will update    HTN  -Amlodipine, HCTZ, torsemide  -ARB held; resume when appropriate    CKD3b  -Crt 1.82 OA (b/l ~1.6-1.8)  -received IVF in ED, tolerating PO, will hold off on additional IVF at this time; monitor     Transaminitis, mild  -likely in setting of infection, monitor    I discussed the patient's findings and my recommendations with patient and ED provider.    VTE Prophylaxis - Lovenox.  Code Status - Full code.       Barrington Marinelli MD  [facility]  03/16/25  14:40 EDT

## 2025-03-16 NOTE — ED NOTES
Pt arrives via ems from home for shortness of breath. Pt had a fever starting Friday along with weakness. Pt has had URI symptoms for a week.   Ems gave a duoneb treatment and 125mg of solumedrol

## 2025-03-16 NOTE — ED PROVIDER NOTES
EMERGENCY DEPARTMENT ENCOUNTER  Room Number:  25/25  Date of encounter:  3/16/2025  PCP: Juan A Rubi MD  Patient Care Team:  Juan A Rubi MD as PCP - General  Juan A Rubi MD as PCP - Family Medicine  EldonCrys MD (Inactive) as Consulting Physician (Ophthalmology)  Ruth Salter MD as Consulting Physician (Hematology and Oncology)  Juan A Rubi MD as Referring Physician (Internal Medicine)     HPI:  Context: Dileep Hamilton is a 79 y.o. male who presents to the ED c/o chief complaint of shortness of breath.  Patient reports he has had a productive cough for approximate last week, has been having shortness of breath for the last several days.  Patient planes of dyspnea on exertion, no dyspnea at rest or orthopnea.  Patient reports low-grade fevers with Tmax of 100.  No nausea vomiting, patient reports he did have diarrhea this morning.  Patient denies any chest pain.  Patient reports that he was recently around his grandkids who were diagnosed with viral illness.  Denies any history of lung disease.    MEDICAL HISTORY REVIEW  Reviewed in EPIC    PAST MEDICAL HISTORY  Active Ambulatory Problems     Diagnosis Date Noted    Malignant neoplasm of colon 01/26/2016    Essential hypertension 01/26/2016    Diabetes type 2, controlled 01/26/2016    Vitamin D deficiency 01/26/2016    Hyperlipidemia 01/26/2016    Venous stasis dermatitis 01/26/2016    Lymphocytosis 01/26/2016    Obesity (BMI 30.0-34.9) 01/26/2016    Squamous cell carcinoma of skin 01/26/2016    Murmur, cardiac 03/03/2016    Aortic valve calcification 08/09/2017    Macular pucker of both eyes 09/21/2018    Primary osteoarthritis of both hips 10/25/2021    Benign prostatic hyperplasia without lower urinary tract symptoms 06/09/2022    Lumbar foraminal stenosis 12/27/2022    Suspected sleep apnea 08/23/2023    CKD stage 3b, GFR 30-44 ml/min 01/08/2025     Resolved Ambulatory Problems     Diagnosis Date Noted    Impaired fasting glucose  01/26/2016    Proteinuria 01/26/2016    Colon cancer     History of malignant neoplasm of rectum, rectosigmoid junction, and anus 01/05/2015    Lung mass 11/12/2015    Healthcare maintenance 01/19/2017    Shingles outbreak 10/15/2017    Lumbar radiculitis 12/27/2022    Trochanteric bursitis of left hip 03/24/2023     Past Medical History:   Diagnosis Date    Arthritis Hips    Benign prostatic hyperplasia 2    Borderline diabetes     Bursitis of hip 09/2002    Cataract Right eye 2020    Family history of blood clots     Heart murmur 2016    Hernia Abdomen    Hip arthrosis 09/2002    History of cataract     History of shingles     Hypertension     IFG (impaired fasting glucose)     Leukocytosis     Obesity     Personal history of scoliosis     Rotator cuff syndrome     Scoliosis Lower back    Squamous cell carcinoma     Stasis dermatitis     Visual impairment Cataracts       PAST SURGICAL HISTORY  Past Surgical History:   Procedure Laterality Date    BURSECTOMY Left 04/19/2023    CATARACT EXTRACTION Right 05/2020    CATARACT EXTRACTION Left 11/02/2022    COLON SURGERY  12/05/2013    COLONOSCOPY  2018    EPIDURAL Left 12/29/2022    Procedure: LUMBAR/SACRAL TRANSFORAMINAL EPIDURAL Left L4-5;  Surgeon: Tori Solitario MD;  [place] [facility] [place];  Service: Pain Management;  Laterality: Left;    SUBTOTAL COLECTOMY  12/05/2014    colon ca 2014; R side of colon only    VASECTOMY      1978       FAMILY HISTORY  Family History   Problem Relation Age of Onset    Heart disease Mother     Diabetes Mother     Hypertension Mother     Lung cancer Mother     COPD Mother         `    Asthma Mother     Anesthesia problems Mother     Arthritis Mother     Arthritis Father     Stroke Father     Diabetes Father     Heart disease Father     Diabetes type II Sister     Diabetes type II Brother     No Known Problems Son     Diabetes Maternal Grandmother     Clotting disorder Other         Family history of blood clots       SOCIAL  HISTORY  Social History     Socioeconomic History    Marital status:      Spouse name: Tana    Number of children: 1   Tobacco Use    Smoking status: Never    Smokeless tobacco: Never   Vaping Use    Vaping status: Never Used   Substance and Sexual Activity    Alcohol use: No    Drug use: No    Sexual activity: Yes     Partners: Female     Birth control/protection: None, Vasectomy     Comment: wife only; no hx STD's       ALLERGIES  Statins    The patient's allergies have been reviewed    REVIEW OF SYSTEMS  All systems reviewed and negative except for those discussed in HPI.     PHYSICAL EXAM  I have reviewed the triage vital signs and nursing notes.  ED Triage Vitals [03/16/25 0912]   Temp Heart Rate Resp BP SpO2   99.7 °F (37.6 °C) 92 20 133/62 96 %      Temp src Heart Rate Source Patient Position BP Location FiO2 (%)   -- -- -- -- --       General: No acute distress.  HENT: NCAT, PERRL, Nares patent.  Eyes: no scleral icterus.  Neck: trachea midline, no ROM limitations.  CV: regular rhythm, regular rate.  Respiratory: normal effort, coarse breath sounds.  Abdomen: soft, nondistended, NTTP, no rebound tenderness, no guarding or rigidity.  Musculoskeletal: no deformity.  Neuro: alert, moves all extremities, follows commands.  Skin: warm, dry.    LAB RESULTS  Recent Results (from the past 24 hours)   ECG 12 Lead Dyspnea    Collection Time: 03/16/25  9:19 AM   Result Value Ref Range    QT Interval 347 ms    QTC Interval 444 ms   Comprehensive Metabolic Panel    Collection Time: 03/16/25  9:21 AM    Specimen: Blood   Result Value Ref Range    Glucose 124 (H) 65 - 99 mg/dL    BUN 31 (H) 8 - 23 mg/dL    Creatinine 1.82 (H) 0.76 - 1.27 mg/dL    Sodium 136 136 - 145 mmol/L    Potassium 4.9 3.5 - 5.2 mmol/L    Chloride 98 98 - 107 mmol/L    CO2 22.7 22.0 - 29.0 mmol/L    Calcium 8.9 8.6 - 10.5 mg/dL    Total Protein 7.2 6.0 - 8.5 g/dL    Albumin 3.2 (L) 3.5 - 5.2 g/dL    ALT (SGPT) 65 (H) 1 - 41 U/L    AST (SGOT) 44  (H) 1 - 40 U/L    Alkaline Phosphatase 136 (H) 39 - 117 U/L    Total Bilirubin 0.8 0.0 - 1.2 mg/dL    Globulin 4.0 gm/dL    A/G Ratio 0.8 g/dL    BUN/Creatinine Ratio 17.0 7.0 - 25.0    Anion Gap 15.3 (H) 5.0 - 15.0 mmol/L    eGFR 37.8 (L) >60.0 mL/min/1.73   High Sensitivity Troponin T    Collection Time: 03/16/25  9:21 AM    Specimen: Blood   Result Value Ref Range    HS Troponin T 55 (C) <22 ng/L   BNP    Collection Time: 03/16/25  9:21 AM    Specimen: Blood   Result Value Ref Range    proBNP 785.0 0.0 - 1,800.0 pg/mL   Magnesium    Collection Time: 03/16/25  9:21 AM    Specimen: Blood   Result Value Ref Range    Magnesium 1.9 1.6 - 2.4 mg/dL   Procalcitonin    Collection Time: 03/16/25  9:21 AM    Specimen: Blood   Result Value Ref Range    Procalcitonin 0.36 (H) 0.00 - 0.25 ng/mL   CBC Auto Differential    Collection Time: 03/16/25  9:21 AM    Specimen: Blood   Result Value Ref Range    WBC 22.35 (H) 3.40 - 10.80 10*3/mm3    RBC 3.61 (L) 4.14 - 5.80 10*6/mm3    Hemoglobin 11.2 (L) 13.0 - 17.7 g/dL    Hematocrit 33.9 (L) 37.5 - 51.0 %    MCV 93.9 79.0 - 97.0 fL    MCH 31.0 26.6 - 33.0 pg    MCHC 33.0 31.5 - 35.7 g/dL    RDW 12.4 12.3 - 15.4 %    RDW-SD 43.0 37.0 - 54.0 fl    MPV 9.2 6.0 - 12.0 fL    Platelets 392 140 - 450 10*3/mm3    Neutrophil % 75.7 42.7 - 76.0 %    Lymphocyte % 16.6 (L) 19.6 - 45.3 %    Monocyte % 6.9 5.0 - 12.0 %    Eosinophil % 0.1 (L) 0.3 - 6.2 %    Basophil % 0.2 0.0 - 1.5 %    Immature Grans % 0.5 0.0 - 0.5 %    Neutrophils, Absolute 16.88 (H) 1.70 - 7.00 10*3/mm3    Lymphocytes, Absolute 3.72 (H) 0.70 - 3.10 10*3/mm3    Monocytes, Absolute 1.55 (H) 0.10 - 0.90 10*3/mm3    Eosinophils, Absolute 0.03 0.00 - 0.40 10*3/mm3    Basophils, Absolute 0.05 0.00 - 0.20 10*3/mm3    Immature Grans, Absolute 0.12 (H) 0.00 - 0.05 10*3/mm3    nRBC 0.0 0.0 - 0.2 /100 WBC   Respiratory Panel PCR w/COVID-19(SARS-CoV-2) DONA/MIGUEL/CONNER/PAD/COR/ILIANA In-House, NP Swab in UTM/VTM, 2 HR TAT - Swab, Nasopharynx     Collection Time: 03/16/25  9:22 AM    Specimen: Nasopharynx; Swab   Result Value Ref Range    ADENOVIRUS, PCR Not Detected Not Detected    Coronavirus 229E Not Detected Not Detected    Coronavirus HKU1 Not Detected Not Detected    Coronavirus NL63 Not Detected Not Detected    Coronavirus OC43 Not Detected Not Detected    COVID19 Not Detected Not Detected - Ref. Range    Human Metapneumovirus Not Detected Not Detected    Human Rhinovirus/Enterovirus Not Detected Not Detected    Influenza A PCR Not Detected Not Detected    Influenza B PCR Not Detected Not Detected    Parainfluenza Virus 1 Not Detected Not Detected    Parainfluenza Virus 2 Not Detected Not Detected    Parainfluenza Virus 3 Not Detected Not Detected    Parainfluenza Virus 4 Not Detected Not Detected    RSV, PCR Not Detected Not Detected    Bordetella pertussis pcr Not Detected Not Detected    Bordetella parapertussis PCR Not Detected Not Detected    Chlamydophila pneumoniae PCR Not Detected Not Detected    Mycoplasma pneumo by PCR Not Detected Not Detected   High Sensitivity Troponin T 1Hr    Collection Time: 03/16/25 10:26 AM    Specimen: Blood   Result Value Ref Range    HS Troponin T 57 (C) <22 ng/L    Troponin T Numeric Delta 2 ng/L    Troponin T % Delta 4 Abnormal if >/= 20%   Lactic Acid, Plasma    Collection Time: 03/16/25 10:26 AM    Specimen: Blood   Result Value Ref Range    Lactate 1.7 0.5 - 2.0 mmol/L       I ordered the above labs and reviewed the results.    RADIOLOGY  XR Chest 1 View  Result Date: 3/16/2025  XR CHEST 1 VW-   INDICATION: Shortness of air  COMPARISON: None  TECHNIQUE: 1 view chest  FINDINGS:  Calcified pulmonary nodules and calcified mediastinal and hilar lymph nodes, consistent with prior granulomatous infection. Linear right perihilar and bibasilar opacities. No effusions. Cardiac silhouette is at upper limits in size for AP technique.       1. Linear right perihilar and bibasilar opacities are most consistent with  subsegmental atelectasis. 2. Findings of old granulomatous infection.  This report was finalized on 3/16/2025 9:38 AM by Dr. Po Perez M.D on Workstation: [id number]        I ordered the above noted radiological studies. I reviewed the images and results. I agree with the radiologist interpretation.    PROCEDURES  Procedures    MEDICATIONS GIVEN IN ER  Medications   azithromycin (ZITHROMAX) 500 mg in sodium chloride 0.9 % 250 mL IVPB-VTB (has no administration in time range)   cefTRIAXone (ROCEPHIN) 2,000 mg in sodium chloride 0.9 % 100 mL MBP (has no administration in time range)   sodium chloride 0.9 % bolus 1,000 mL (1,000 mL Intravenous New Bag 3/16/25 1024)   ondansetron ODT (ZOFRAN-ODT) disintegrating tablet 4 mg (has no administration in time range)     Or   ondansetron (ZOFRAN) injection 4 mg (has no administration in time range)   sennosides-docusate (PERICOLACE) 8.6-50 MG per tablet 2 tablet (has no administration in time range)     And   polyethylene glycol (MIRALAX) packet 17 g (has no administration in time range)     And   bisacodyl (DULCOLAX) EC tablet 5 mg (has no administration in time range)     And   bisacodyl (DULCOLAX) suppository 10 mg (has no administration in time range)   cefTRIAXone (ROCEPHIN) 1,000 mg in sodium chloride 0.9 % 100 mL MBP (has no administration in time range)       PROGRESS, DATA ANALYSIS, CONSULTS, AND MEDICAL DECISION MAKING  A complete history and physical exam have been performed.  All available laboratory and imaging results have been reviewed by myself prior to disposition.    MDM    After the initial H&P, I discussed pertinent information from history and physical exam with patient/family.  Discussed differential diagnosis.  Discussed plan for ED evaluation/workup/treatment.  All questions answered.  Patient/family is agreeable with plan.  ED Course as of 03/16/25 1125   Sun Mar 16, 2025   0915 My differential diagnosis for dyspnea includes but is not limited  to:  Asthma, COPD, pneumonia, pulmonary embolus, acute respiratory distress syndrome, pneumothorax, pleural effusion, pulmonary fibrosis, congestive heart failure, myocardial infarction, DKA, uremia, acidosis, sepsis, anemia, drug related, hyperventilation, CNS disease     [JG]   0923 EKG independently viewed and contemporaneously interpreted by ED physician. Time: 9:19 AM.  Rate 98.  Interpretation: Normal sinus rhythm, normal axis, nonspecific interventricular conduction delay, delayed R wave progression, no acute ST changes. [JG]   1016 Reviewed chest x-ray in PACS, patient has bilateral lower pulmonary infiltrates per my read. [JG]   1017 Mild elevation of temperature with elevated heart rate, significant leukocytosis with left shift, procalcitonin elevated, chest x-ray showing bilateral pulmonary infiltrates.  Patient markers for sepsis.  Started on broad-spectrum antibiotics, using ceftriaxone azithromycin, obtain blood cultures and lactic acid, plan for admission. [JG]   1018 Creatinine 1.82, appears chronic in nature as prior values from 11 days ago was 1.94, 1 month ago 1.75. [JG]   1019 Troponin at elevated 55, no prior for comparison, EKG shows no acute findings, in the setting of sepsis with pneumonia, troponin elevation suspected be secondary to cardiac strain, obtain repeat troponin to evaluate trend. [JG]   1024 PSI/PORT Score: Pneumonia Severity Index for CAP - MDCalc  Calculated on Mar 16 2025 10:24 AM  137 points -> Risk Class V, 27.0-29.2% mortality. Hospitalization recommended based on risk.   [JG]   1024 Patient reassessed.  Discussed ED findings, differential diagnosis, and the need for admission for evaluation/treatment.  They are agreeable to admission and all questions were answered.     [JG]   1053 Phone call with Dr. Marinelli.  Discussed the patient, relevant history, exam, diagnostics, ED findings/progress, and concerns. They agree to admit the patient to telemetry. Care assumed by the  admitting physician at this time.     [JG]   1125 Repeat troponin flat, reassuring. [JG]   1125 Patient negative for COVID and influenza. [JG]      ED Course User Index  [JG] Kg Winter MD       AS OF 11:25 EDT VITALS:    BP - 133/62  HR - 92  TEMP - 99.7 °F (37.6 °C)  O2 SATS - 96%    DIAGNOSIS  Final diagnoses:   Sepsis, due to unspecified organism, unspecified whether acute organ dysfunction present   Pneumonia due to infectious organism, unspecified laterality, unspecified part of lung   Chronic kidney disease, unspecified CKD stage   Hyperglycemia   Elevated troponin   Anemia, unspecified type     Critical care:  Total critical care time of 45 minutes is exclusive of any other billable procedures and includes time spent with direct patient care and observation, retrospective chart review, management of acute condition, and consultation with other physicians.      DISPOSITION  ADMISSION    Discussed treatment plan and reason for admission with pt/family and admitting physician.  Pt/family voiced understanding of the plan for admission for further testing/treatment as needed.        Kg Winter MD  03/16/25 1125

## 2025-03-16 NOTE — PLAN OF CARE
Goal Outcome Evaluation:           Problem: Adult Inpatient Plan of Care  Goal: Patient-Specific Goal (Individualized)  Outcome: Progressing    Problem: Adult Inpatient Plan of Care  Goal: Optimal Comfort and Wellbeing  Intervention: Provide Person-Centered Care  Recent Flowsheet Documentation  Taken 3/16/2025 1520 by Alejandrina Freeman, RN  Trust Relationship/Rapport:   care explained   choices provided   emotional support provided   empathic listening provided   questions answered   questions encouraged   reassurance provided   thoughts/feelings acknowledged     Received report from Carissa JONES in ED. Pt arrived to unit a/ox4, 96% on room air, ambulated to [facility], states 5/10 pain and weakness in legs. Reports shortness of breath with exertion and a clear productive cough. Pt oriented to unit and safety precautions in place. Wife at bedside. Pending further plan of care.

## 2025-03-17 ENCOUNTER — APPOINTMENT (OUTPATIENT)
Dept: CARDIOLOGY | Facility: HOSPITAL | Age: 80
DRG: 871 | End: 2025-03-17
Payer: MEDICARE

## 2025-03-17 PROBLEM — J15.9 BACTERIAL PNEUMONIA: Status: ACTIVE | Noted: 2025-03-17

## 2025-03-17 PROBLEM — E66.812 CLASS 2 SEVERE OBESITY WITH SERIOUS COMORBIDITY IN ADULT: Status: ACTIVE | Noted: 2025-03-17

## 2025-03-17 PROBLEM — E66.01 CLASS 2 SEVERE OBESITY WITH SERIOUS COMORBIDITY IN ADULT: Status: ACTIVE | Noted: 2025-03-17

## 2025-03-17 LAB
ALBUMIN SERPL-MCNC: 2.9 G/DL (ref 3.5–5.2)
ALBUMIN/GLOB SERPL: 0.8 G/DL
ALP SERPL-CCNC: 117 U/L (ref 39–117)
ALT SERPL W P-5'-P-CCNC: 51 U/L (ref 1–41)
ANION GAP SERPL CALCULATED.3IONS-SCNC: 11.3 MMOL/L (ref 5–15)
AORTIC ARCH: 2.9 CM
AORTIC DIMENSIONLESS INDEX: 0.63 (DI)
ASCENDING AORTA: 3.9 CM
AST SERPL-CCNC: 33 U/L (ref 1–40)
AV MEAN PRESS GRAD SYS DOP V1V2: 18.3 MMHG
AV VMAX SYS DOP: 265.8 CM/SEC
BASOPHILS # BLD AUTO: 0.03 10*3/MM3 (ref 0–0.2)
BASOPHILS NFR BLD AUTO: 0.1 % (ref 0–1.5)
BH CV ECHO MEAS - ACS: 1.6 CM
BH CV ECHO MEAS - AO MAX PG: 28.3 MMHG
BH CV ECHO MEAS - AO ROOT AREA (BSA CORRECTED): 1.6 CM2
BH CV ECHO MEAS - AO ROOT DIAM: 3.9 CM
BH CV ECHO MEAS - AO V2 VTI: 56 CM
BH CV ECHO MEAS - AVA(I,D): 1.95 CM2
BH CV ECHO MEAS - EDV(CUBED): 200.2 ML
BH CV ECHO MEAS - EDV(MOD-SP2): 146 ML
BH CV ECHO MEAS - EDV(MOD-SP4): 140 ML
BH CV ECHO MEAS - EF(MOD-SP2): 78.1 %
BH CV ECHO MEAS - EF(MOD-SP4): 71.4 %
BH CV ECHO MEAS - ESV(CUBED): 40.2 ML
BH CV ECHO MEAS - ESV(MOD-SP2): 32 ML
BH CV ECHO MEAS - ESV(MOD-SP4): 40 ML
BH CV ECHO MEAS - FS: 41.5 %
BH CV ECHO MEAS - IVS/LVPW: 1.05 CM
BH CV ECHO MEAS - IVSD: 1.12 CM
BH CV ECHO MEAS - LAT PEAK E' VEL: 7.1 CM/SEC
BH CV ECHO MEAS - LV DIASTOLIC VOL/BSA (35-75): 58.4 CM2
BH CV ECHO MEAS - LV MASS(C)D: 266.8 GRAMS
BH CV ECHO MEAS - LV MAX PG: 8.9 MMHG
BH CV ECHO MEAS - LV MEAN PG: 5.9 MMHG
BH CV ECHO MEAS - LV SYSTOLIC VOL/BSA (12-30): 16.7 CM2
BH CV ECHO MEAS - LV V1 MAX: 149 CM/SEC
BH CV ECHO MEAS - LV V1 VTI: 35.2 CM
BH CV ECHO MEAS - LVIDD: 5.9 CM
BH CV ECHO MEAS - LVIDS: 3.4 CM
BH CV ECHO MEAS - LVOT AREA: 3.1 CM2
BH CV ECHO MEAS - LVOT DIAM: 1.99 CM
BH CV ECHO MEAS - LVPWD: 1.07 CM
BH CV ECHO MEAS - MED PEAK E' VEL: 5.5 CM/SEC
BH CV ECHO MEAS - MV A DUR: 0.1 SEC
BH CV ECHO MEAS - MV A MAX VEL: 134.5 CM/SEC
BH CV ECHO MEAS - MV DEC SLOPE: 520.8 CM/SEC2
BH CV ECHO MEAS - MV DEC TIME: 0.23 SEC
BH CV ECHO MEAS - MV E MAX VEL: 133 CM/SEC
BH CV ECHO MEAS - MV E/A: 0.99
BH CV ECHO MEAS - MV MAX PG: 8 MMHG
BH CV ECHO MEAS - MV MEAN PG: 4.2 MMHG
BH CV ECHO MEAS - MV P1/2T: 82 MSEC
BH CV ECHO MEAS - MV V2 VTI: 44.7 CM
BH CV ECHO MEAS - MVA(P1/2T): 2.7 CM2
BH CV ECHO MEAS - MVA(VTI): 2.45 CM2
BH CV ECHO MEAS - PA ACC TIME: 0.12 SEC
BH CV ECHO MEAS - PA V2 MAX: 122.6 CM/SEC
BH CV ECHO MEAS - PULM A REVS DUR: 0.13 SEC
BH CV ECHO MEAS - PULM A REVS VEL: 38.8 CM/SEC
BH CV ECHO MEAS - PULM DIAS VEL: 50.5 CM/SEC
BH CV ECHO MEAS - PULM S/D: 1.23
BH CV ECHO MEAS - PULM SYS VEL: 62.2 CM/SEC
BH CV ECHO MEAS - RV MAX PG: 6.9 MMHG
BH CV ECHO MEAS - RV V1 MAX: 131.1 CM/SEC
BH CV ECHO MEAS - RV V1 VTI: 26.6 CM
BH CV ECHO MEAS - SV(LVOT): 109.4 ML
BH CV ECHO MEAS - SV(MOD-SP2): 114 ML
BH CV ECHO MEAS - SV(MOD-SP4): 100 ML
BH CV ECHO MEAS - SVI(LVOT): 45.6 ML/M2
BH CV ECHO MEAS - SVI(MOD-SP2): 47.6 ML/M2
BH CV ECHO MEAS - SVI(MOD-SP4): 41.7 ML/M2
BH CV ECHO MEAS - TAPSE (>1.6): 2.09 CM
BH CV ECHO MEASUREMENTS AVERAGE E/E' RATIO: 21.11
BH CV XLRA - RV BASE: 3.2 CM
BH CV XLRA - RV LENGTH: 8.3 CM
BH CV XLRA - RV MID: 2.8 CM
BH CV XLRA - TDI S': 16.2 CM/SEC
BILIRUB SERPL-MCNC: 0.3 MG/DL (ref 0–1.2)
BUN SERPL-MCNC: 38 MG/DL (ref 8–23)
BUN/CREAT SERPL: 22.6 (ref 7–25)
CALCIUM SPEC-SCNC: 9.1 MG/DL (ref 8.6–10.5)
CHLORIDE SERPL-SCNC: 102 MMOL/L (ref 98–107)
CO2 SERPL-SCNC: 22.7 MMOL/L (ref 22–29)
CREAT SERPL-MCNC: 1.68 MG/DL (ref 0.76–1.27)
DEPRECATED RDW RBC AUTO: 40.9 FL (ref 37–54)
EGFRCR SERPLBLD CKD-EPI 2021: 41.6 ML/MIN/1.73
EOSINOPHIL # BLD AUTO: 0 10*3/MM3 (ref 0–0.4)
EOSINOPHIL NFR BLD AUTO: 0 % (ref 0.3–6.2)
ERYTHROCYTE [DISTWIDTH] IN BLOOD BY AUTOMATED COUNT: 12.3 % (ref 12.3–15.4)
GLOBULIN UR ELPH-MCNC: 3.6 GM/DL
GLUCOSE SERPL-MCNC: 168 MG/DL (ref 65–99)
HCT VFR BLD AUTO: 31.2 % (ref 37.5–51)
HGB BLD-MCNC: 10.6 G/DL (ref 13–17.7)
IMM GRANULOCYTES # BLD AUTO: 0.26 10*3/MM3 (ref 0–0.05)
IMM GRANULOCYTES NFR BLD AUTO: 1 % (ref 0–0.5)
LEFT ATRIUM VOLUME INDEX: 19.8 ML/M2
LV EF BIPLANE MOD: 74.6 %
LYMPHOCYTES # BLD AUTO: 1.41 10*3/MM3 (ref 0.7–3.1)
LYMPHOCYTES NFR BLD AUTO: 5.6 % (ref 19.6–45.3)
MCH RBC QN AUTO: 31.1 PG (ref 26.6–33)
MCHC RBC AUTO-ENTMCNC: 34 G/DL (ref 31.5–35.7)
MCV RBC AUTO: 91.5 FL (ref 79–97)
MONOCYTES # BLD AUTO: 0.7 10*3/MM3 (ref 0.1–0.9)
MONOCYTES NFR BLD AUTO: 2.8 % (ref 5–12)
NEUTROPHILS NFR BLD AUTO: 22.86 10*3/MM3 (ref 1.7–7)
NEUTROPHILS NFR BLD AUTO: 90.5 % (ref 42.7–76)
NRBC BLD AUTO-RTO: 0 /100 WBC (ref 0–0.2)
PLATELET # BLD AUTO: 394 10*3/MM3 (ref 140–450)
PMV BLD AUTO: 9.4 FL (ref 6–12)
POTASSIUM SERPL-SCNC: 5.4 MMOL/L (ref 3.5–5.2)
PROT SERPL-MCNC: 6.5 G/DL (ref 6–8.5)
RBC # BLD AUTO: 3.41 10*6/MM3 (ref 4.14–5.8)
SINUS: 4.2 CM
SODIUM SERPL-SCNC: 136 MMOL/L (ref 136–145)
STJ: 3.7 CM
WBC NRBC COR # BLD AUTO: 25.26 10*3/MM3 (ref 3.4–10.8)

## 2025-03-17 PROCEDURE — 25010000002 CEFTRIAXONE PER 250 MG: Performed by: STUDENT IN AN ORGANIZED HEALTH CARE EDUCATION/TRAINING PROGRAM

## 2025-03-17 PROCEDURE — 93306 TTE W/DOPPLER COMPLETE: CPT

## 2025-03-17 PROCEDURE — 97116 GAIT TRAINING THERAPY: CPT

## 2025-03-17 PROCEDURE — 85025 COMPLETE CBC W/AUTO DIFF WBC: CPT | Performed by: STUDENT IN AN ORGANIZED HEALTH CARE EDUCATION/TRAINING PROGRAM

## 2025-03-17 PROCEDURE — 25010000002 ENOXAPARIN PER 10 MG: Performed by: STUDENT IN AN ORGANIZED HEALTH CARE EDUCATION/TRAINING PROGRAM

## 2025-03-17 PROCEDURE — 25510000001 PERFLUTREN 6.52 MG/ML SUSPENSION 2 ML VIAL: Performed by: STUDENT IN AN ORGANIZED HEALTH CARE EDUCATION/TRAINING PROGRAM

## 2025-03-17 PROCEDURE — 97161 PT EVAL LOW COMPLEX 20 MIN: CPT

## 2025-03-17 PROCEDURE — 93306 TTE W/DOPPLER COMPLETE: CPT | Performed by: INTERNAL MEDICINE

## 2025-03-17 PROCEDURE — 80053 COMPREHEN METABOLIC PANEL: CPT | Performed by: STUDENT IN AN ORGANIZED HEALTH CARE EDUCATION/TRAINING PROGRAM

## 2025-03-17 RX ORDER — ACETAMINOPHEN 325 MG/1
650 TABLET ORAL EVERY 4 HOURS PRN
Status: DISCONTINUED | OUTPATIENT
Start: 2025-03-17 | End: 2025-03-18 | Stop reason: HOSPADM

## 2025-03-17 RX ORDER — AZITHROMYCIN 250 MG/1
500 TABLET, FILM COATED ORAL
Status: COMPLETED | OUTPATIENT
Start: 2025-03-17 | End: 2025-03-18

## 2025-03-17 RX ORDER — GUAIFENESIN 600 MG/1
1200 TABLET, EXTENDED RELEASE ORAL EVERY 12 HOURS SCHEDULED
Status: DISCONTINUED | OUTPATIENT
Start: 2025-03-17 | End: 2025-03-18 | Stop reason: HOSPADM

## 2025-03-17 RX ORDER — ENOXAPARIN SODIUM 100 MG/ML
40 INJECTION SUBCUTANEOUS EVERY 24 HOURS
Status: DISCONTINUED | OUTPATIENT
Start: 2025-03-17 | End: 2025-03-18 | Stop reason: HOSPADM

## 2025-03-17 RX ORDER — BENZONATATE 100 MG/1
200 CAPSULE ORAL 3 TIMES DAILY PRN
Status: DISCONTINUED | OUTPATIENT
Start: 2025-03-17 | End: 2025-03-18 | Stop reason: HOSPADM

## 2025-03-17 RX ADMIN — PERFLUTREN 2 ML: 6.52 INJECTION, SUSPENSION INTRAVENOUS at 16:27

## 2025-03-17 RX ADMIN — SODIUM CHLORIDE 2000 MG: 9 INJECTION, SOLUTION INTRAVENOUS at 10:31

## 2025-03-17 RX ADMIN — SODIUM ZIRCONIUM CYCLOSILICATE 10 G: 10 POWDER, FOR SUSPENSION ORAL at 09:38

## 2025-03-17 RX ADMIN — HYDROCHLOROTHIAZIDE 12.5 MG: 12.5 TABLET ORAL at 08:09

## 2025-03-17 RX ADMIN — Medication 1000 MCG: at 08:09

## 2025-03-17 RX ADMIN — GUAIFENESIN 1200 MG: 600 TABLET, EXTENDED RELEASE ORAL at 20:16

## 2025-03-17 RX ADMIN — ENOXAPARIN SODIUM 40 MG: 100 INJECTION SUBCUTANEOUS at 20:16

## 2025-03-17 RX ADMIN — AZITHROMYCIN 500 MG: 250 TABLET, FILM COATED ORAL at 08:09

## 2025-03-17 RX ADMIN — AMLODIPINE BESYLATE 10 MG: 5 TABLET ORAL at 08:09

## 2025-03-17 RX ADMIN — MAGNESIUM OXIDE TAB 400 MG (241.3 MG ELEMENTAL MG) 400 MG: 400 (241.3 MG) TAB at 15:03

## 2025-03-17 RX ADMIN — TORSEMIDE 5 MG: 10 TABLET ORAL at 08:09

## 2025-03-17 RX ADMIN — THERA TABS 1 TABLET: TAB at 08:09

## 2025-03-17 NOTE — PROGRESS NOTES
Discharge Planning Assessment  [facility]     Patient Name: Dileep Hamilton  MRN: [id number]  Today's Date: 3/17/2025    Admit Date: 3/16/2025    Plan: Return home with spouse   Discharge Needs Assessment       Row Name 03/17/25 1539       Living Environment    People in Home spouse    Name(s) of People in Home Isabela Fajardo    Current Living Arrangements home    Potentially Unsafe Housing Conditions none    Primary Care Provided by self    Provides Primary Care For no one    Family Caregiver if Needed spouse    Family Caregiver Names Isabela Fajardo [phone]    Quality of Family Relationships helpful    Able to Return to Prior Arrangements yes       Resource/Environmental Concerns    Resource/Environmental Concerns none    Transportation Concerns none       Transition Planning    Patient/Family Anticipates Transition to home with family    Patient/Family Anticipated Services at Transition none    Transportation Anticipated family or friend will provide       Discharge Needs Assessment    Readmission Within the Last 30 Days no previous admission in last 30 days    Equipment Currently Used at Home none    Concerns to be Addressed denies needs/concerns at this time    Anticipated Changes Related to Illness none    Equipment Needed After Discharge none                   Discharge Plan       Row Name 03/17/25 6896       Plan    Plan Return home with spouse    Patient/Family in Agreement with Plan yes    Plan Comments Spoke with patient at bedside.  He lives with wife Tana [phone] in a house with no steps, is IADL, uses no DME, has never used HH or been to SNF.  PCP is Dr. Juan A Rubi and pharmacy is [facility] in [facility].  Patient drives, wife drives and will assist if needed.  He plans to return home at [place].  CCP will follow.  Loree JONES                  Selected Continued Care - Episodes Includes continued care and service providers with selected services from the active episodes listed below          Expected  Discharge Date and Time       Expected Discharge Date Expected Discharge Time    Mar 20, 2025            Demographic Summary       Row Name 03/17/25 1538       General Information    Admission Type inpatient    Arrived From home    Referral Source admission list    Reason for Consult discharge planning    Preferred Language English                   Functional Status       Row Name 03/17/25 1539       Functional Status    Usual Activity Tolerance good    Current Activity Tolerance moderate       Functional Status, IADL    Medications independent    Meal Preparation independent;assistive person  Wife helps if needed    Housekeeping assistive person;independent    Laundry assistive person;independent    Shopping assistive person;independent       Mental Status    General Appearance WDL WDL       Mental Status Summary    Recent Changes in Mental Status/Cognitive Functioning no changes                              Becky S. [place], RN

## 2025-03-17 NOTE — PROGRESS NOTES
[facility] Clinical Pharmacy Services: Dose Adjustment    Ceftiaxone and lovenox have been appropriately dose adjusted based on our System P&T approved policy (ceftriaxone 1g q24 --> ceftriaxone 2g q24 for BMI >30; lovenox 30 mg q24h -->lovenox 40 mg q24h for CrCl >30 ml/min and BMI 18-40). Pharmacy will continue to monitor patient peripherally while in-house.     Karolina Pardo [facility]  Clinical Pharmacist

## 2025-03-17 NOTE — PROGRESS NOTES
[facility] Clinical Pharmacy Services: Dose Adjustment    Ceftiaxone has been appropriately dose adjusted based on our System P&T approved policy (ceftriaxone 1g q24 --> ceftriaxone 2g q24 for BMI >30). Pharmacy will continue to monitor patient peripherally while in-house.     Karolina Pardo [facility]  Clinical Pharmacist

## 2025-03-17 NOTE — PLAN OF CARE
Problem: Adult Inpatient Plan of Care  Goal: Plan of Care Review  Outcome: Progressing  Flowsheets (Taken 3/17/2025 5056)  Progress: improving  Plan of Care Reviewed With: patient   Goal Outcome Evaluation:  Plan of Care Reviewed With: patient        Progress: improving             Pt remains on room air. Pt is getting oral and IV antibiotics.Pt has been up lad estrellita in the room. VSS. Anticipate discharge home in the next day or two.

## 2025-03-17 NOTE — PLAN OF CARE
Goal Outcome Evaluation:  Plan of Care Reviewed With: patient           Outcome Evaluation: Pt is a 80 yo M admitted from home with SOA, fever, and weakness - multifocal PNA. Pt lives with his wife and reports independence at BL with no AD. Pt has no stairs at home, no recent falls, reporting 5/10 B foot pain. Pt sitting EOB on arrival, reports he's been getting up to the bathroom ad estrellita. Pt presents to PT with minimal functional deficits, appears to be likely close to his BL. Pt stood with SV, noted mild unsteadiness initially upon standing, benefitted from rwx use. Pt ambulated 150ft with SV-mod I using rwx, not additional LOB, gait distance limited by fatigue. Pt returned to room and agreeable to sitting UIC. Pt did not require any assist from PT to mobilize today, encouraged to continue walking with nsg vs his wife in the hallway using a rwx. Pt denies any further acute PT needs, discussed ordering a rwx for home if he continues to use it while admitted d/t B foot pain and pt verbalized understanding. PT will sign-off.    Anticipated Discharge Disposition (PT): home with assist

## 2025-03-17 NOTE — PROGRESS NOTES
[facility] Medicine Services  PROGRESS NOTE    Patient Name: Dileep Hamilton  : 1945  MRN: [id number]    Date of Admission: 3/16/2025  Primary Care Physician: Juan A Rubi MD    Subjective   Subjective     CC:  Follow-up for recent shortness of breath and cough pneumonia    Subjective:  Patient is feeling better.  He is still weak and having cough with green sputum but it is improved.    Review of Systems  No current fevers or chills  No current nausea, vomiting, or diarrhea  No current chest pain or palpitations       Objective   Objective     Vital Signs:   Temp:  [97.9 °F (36.6 °C)-98.8 °F (37.1 °C)] 97.9 °F (36.6 °C)  Heart Rate:  [78-98] 91  Resp:  [17-20] 17  BP: (127-169)/(65-78) 144/65        Physical Exam:  Constitutional: Awake, alert  HENT: NCAT, mucous membranes moist, neck supple  Respiratory: Cough and coarse sounds are present  Cardiovascular: Pulse rate is normal, palpable radial pulses  Gastrointestinal:  Soft, nontender, nondistended  Musculoskeletal: Obese, BMI is 36  Psychiatric: Appropriate affect, cooperative, conversational  Neurologic: No slurred speech or facial droop, follows commands  Skin: No rashes or jaundice, warm      Results Reviewed:  Results from last 7 days   Lab Units 25  0514 25  0921   WBC 10*3/mm3 25.26* 22.35*   HEMOGLOBIN g/dL 10.6* 11.2*   HEMATOCRIT % 31.2* 33.9*   PLATELETS 10*3/mm3 394 392   PROCALCITONIN ng/mL  --  0.36*     Results from last 7 days   Lab Units 25  0514 25  1026 25  0921   SODIUM mmol/L 136  --  136   POTASSIUM mmol/L 5.4*  --  4.9   CHLORIDE mmol/L 102  --  98   CO2 mmol/L 22.7  --  22.7   BUN mg/dL 38*  --  31*   CREATININE mg/dL 1.68*  --  1.82*   GLUCOSE mg/dL 168*  --  124*   CALCIUM mg/dL 9.1  --  8.9   ALK PHOS U/L 117  --  136*   ALT (SGPT) U/L 51*  --  65*   AST (SGOT) U/L 33  --  44*   HSTROP T ng/L  --  57* 55*   PROBNP pg/mL  --   --  785.0     Estimated Creatinine Clearance: 49.3 mL/min (A) (by  C-G formula based on SCr of 1.68 mg/dL (H)).    Microbiology Results Abnormal       None            Imaging Results (Last 24 Hours)       ** No results found for the last 24 hours. **            Results for orders placed in visit on 03/03/16    Adult transthoracic echo complete with contrast    Interpretation Summary  · Left ventricular function is normal. Calculated EF = 75%. Estimated EF was in agreement with the calculated EF. Normal left ventricular cavity size noted. All left ventricular wall segments contract normally. Left ventricular wall thickness is consistent with mild concentric hypertrophy. Left ventricular diastolic dysfunction (grade II) consistent with pseudonormalization.  · There is calcification of the aortic valve present. No aortic valve regurgitation is present. No aortic valve stenosis is present.  · Moderate mitral annular calcification is present. No mitral valve regurgitation is present.      I have reviewed the medications:  Scheduled Meds:amLODIPine, 10 mg, Oral, Daily  azithromycin, 500 mg, Oral, Q24H  cefTRIAXone, 2,000 mg, Intravenous, Q24H  enoxaparin sodium, 40 mg, Subcutaneous, Q24H  ferrous sulfate, 325 mg, Oral, Once per day on Sunday Tuesday Thursday Saturday  guaiFENesin, 1,200 mg, Oral, Q12H  hydroCHLOROthiazide, 12.5 mg, Oral, Daily  [Held by provider] losartan, 100 mg, Oral, Q24H  magnesium oxide, 400 mg, Oral, Daily  multivitamin, 1 tablet, Oral, Daily  torsemide, 5 mg, Oral, Daily  vitamin B-12, 1,000 mcg, Oral, Daily      Continuous Infusions:   PRN Meds:.  acetaminophen    benzonatate    senna-docusate sodium **AND** polyethylene glycol **AND** bisacodyl **AND** bisacodyl    ondansetron ODT **OR** ondansetron    Assessment & Plan   Assessment & Plan     Active Hospital Problems    Diagnosis  POA    **Sepsis [A41.9]  Yes    Bacterial pneumonia [J15.9]  Yes    Class 2 severe obesity with serious comorbidity in adult [E66.812, E66.01]  Yes    Elevated troponin [R79.89]  Yes     CKD stage 3b, GFR 30-44 ml/min [N18.32]  Yes    Benign prostatic hyperplasia without lower urinary tract symptoms [N40.0]  Yes    Essential hypertension [I10]  Yes    Hyperlipidemia [E78.5]  Yes    Diabetes type 2, controlled [E11.9]  Yes      Resolved Hospital Problems   No resolved problems to display.        Brief Hospital Course to date:  Dileep Hamilton is a 79 y.o. male presents the hospital with sepsis due to multifocal bacterial pneumonia.    Discussion/plan for today: Chest x-ray reviewed and multifocal pneumonia is present.  Clinically patient appears to be stabilizing.  He feels much better overall.  Increase leukocytosis may be him responding to treatment.  Plan to reevaluate again tomorrow.  Treat hyperkalemia with Lokelma.  Dose adjust ceftriaxone to 2 g.  Increase Lovenox to 40 mg.  Continue azithromycin per my preference.  Transition to low potassium diet.  Add magnesium for muscle cramps in the legs.  Losartan on hold due to renal function.  Iron supplementation for anemia.  Treatment plan discussed with patient who is in agreement.  Possibly discharge home in 1 to 2 days.    DVT Prophylaxis: Lovenox      Disposition: I expect the patient to be discharged home when improved.    CODE STATUS:   Code Status and Medical Interventions: CPR (Attempt to Resuscitate); Full   Ordered at: 03/16/25 1109     Code Status (Patient has no pulse and is not breathing):    CPR (Attempt to Resuscitate)     Medical Interventions (Patient has pulse or is breathing):    Full     Level Of Support Discussed With:    Patient       Jordy Gilmore MD  03/17/25

## 2025-03-17 NOTE — THERAPY EVALUATION
Patient Name: Dileep Hamilton  : 1945    MRN: [id number]                              Today's Date: 3/17/2025       Admit Date: 3/16/2025    Visit Dx:     ICD-10-CM ICD-9-CM   1. Sepsis, due to unspecified organism, unspecified whether acute organ dysfunction present  A41.9 038.9     995.91   2. Pneumonia due to infectious organism, unspecified laterality, unspecified part of lung  J18.9 486   3. Chronic kidney disease, unspecified CKD stage  N18.9 585.9   4. Hyperglycemia  R73.9 790.29   5. Elevated troponin  R79.89 790.6   6. Anemia, unspecified type  D64.9 285.9     Patient Active Problem List   Diagnosis    Malignant neoplasm of colon    Essential hypertension    Diabetes type 2, controlled    Vitamin D deficiency    Hyperlipidemia    Venous stasis dermatitis    Lymphocytosis    Obesity (BMI 30.0-34.9)    Squamous cell carcinoma of skin    Murmur, cardiac    Aortic valve calcification    Macular pucker of both eyes    Primary osteoarthritis of both hips    Benign prostatic hyperplasia without lower urinary tract symptoms    Lumbar foraminal stenosis    Suspected sleep apnea    CKD stage 3b, GFR 30-44 ml/min    Sepsis    Elevated troponin    Bacterial pneumonia    Class 2 severe obesity with serious comorbidity in adult     Past Medical History:   Diagnosis Date    Arthritis Hips    Bacterial pneumonia 3/17/2025    Benign prostatic hyperplasia 2    Borderline diabetes     Bursitis of hip 2002    Cataract Right eye     Colon cancer     T1N0 stage I, s/p partial R colon resection     Diabetes type 2, controlled 2016    Family history of blood clots     Heart murmur 2016    Hernia Abdomen    Hip arthrosis 2002    History of cataract     B early     History of shingles     2013    Hyperlipidemia     Hypertension     dx'd 2014    IFG (impaired fasting glucose)     Leukocytosis     Lumbar radiculitis 2022    Lung mass 2015    chronic per pt, distant imaging     Obesity      Personal history of scoliosis     mild    Proteinuria     Rotator cuff syndrome     Scoliosis Lower back    Shingles outbreak 10/15/2017    Squamous cell carcinoma     skin    Stasis dermatitis     Trochanteric bursitis of left hip 03/24/2023    Formatting of this note might be different from the original.  Added automatically from request for surgery [id number]      Visual impairment Cataracts    Vitamin D deficiency      Past Surgical History:   Procedure Laterality Date    BURSECTOMY Left 04/19/2023    CATARACT EXTRACTION Right 05/2020    CATARACT EXTRACTION Left 11/02/2022    COLON SURGERY  12/05/2013    COLONOSCOPY  2018    EPIDURAL Left 12/29/2022    Procedure: LUMBAR/SACRAL TRANSFORAMINAL EPIDURAL Left L4-5;  Surgeon: Tori oSlitario MD;  Location: [facility];  Service: Pain Management;  Laterality: Left;    SUBTOTAL COLECTOMY  12/05/2014    colon ca 2014; R side of colon only    VASECTOMY      1978      General Information       [facility] Name 03/17/25 0856          Physical Therapy Time and Intention    Document Type evaluation;discharge evaluation/summary  -     Mode of Treatment individual therapy;physical therapy  -       Row Name 03/17/25 0856          General Information    Patient Profile Reviewed yes  -     Prior Level of Function independent:;gait;transfer;bed mobility  -     Existing Precautions/Restrictions no known precautions/restrictions  -     Barriers to Rehab none identified  -       Row Name 03/17/25 0856          Living Environment    Current Living Arrangements home  -     People in Home spouse  -       Row Name 03/17/25 0856          Home Main Entrance    Number of Stairs, Main Entrance none  -       Row Name 03/17/25 0856          Stairs Within Home, Primary    Number of Stairs, Within Home, Primary none  -       Row Name 03/17/25 0856          Cognition    Orientation Status (Cognition) oriented x 4  -       Row Name 03/17/25 0856          Safety Issues/Impairments  Affecting Functional Mobility    Impairments Affecting Function (Mobility) balance;endurance/activity tolerance  -               User Key  (r) = Recorded By, (t) = Taken By, (c) = Cosigned By      Initials Name Provider Type     Lay Haney PT Physical Therapist                   Mobility       Row Name 03/17/25 0856          Bed Mobility    Comment, (Bed Mobility) NT - sitting EOB on arrival, UIC at end of session  -[facility] Name 03/17/25 0856          Sit-Stand Transfer    Sit-Stand [place] (Transfers) supervision  -[facility] Name 03/17/25 0856          Gait/Stairs (Locomotion)    [place] Level (Gait) supervision;modified independence  -     Assistive Device (Gait) walker, front-wheeled  -     Distance in Feet (Gait) 150  -     Deviations/Abnormal Patterns (Gait) gait speed decreased  -               User Key  (r) = Recorded By, (t) = Taken By, (c) = Cosigned By      Initials Name Provider Type     Lay Haney PT Physical Therapist                   Obj/Interventions       [facility] Name 03/17/25 0857          Range of Motion Comprehensive    General Range of Motion bilateral lower extremity ROM WFL  -BH       Row Name 03/17/25 0857          Strength Comprehensive (MMT)    General Manual Muscle Testing (MMT) Assessment no strength deficits identified  -     Comment, General Manual Muscle Testing (MMT) Assessment BLE WFL  -[facility] Name 03/17/25 0857          Balance    Balance Assessment sitting static balance;sitting dynamic balance;standing static balance;standing dynamic balance  -     Static Sitting Balance independent  -     Dynamic Sitting Balance independent  -     Position, Sitting Balance unsupported;sitting edge of bed  -     Static Standing Balance modified independence  -     Dynamic Standing Balance supervision;modified independence  -     Position/Device Used, Standing Balance supported;walker, front-wheeled  -     Balance Interventions  sitting;standing;sit to stand;supported;static;dynamic  -       Row Name 03/17/25 0857          Sensory Assessment (Somatosensory)    Sensory Assessment (Somatosensory) LE sensation intact  -               User Key  (r) = Recorded By, (t) = Taken By, (c) = Cosigned By      Initials Name Provider Type     Lay Haney, PT Physical Therapist                   Goals/Plan    No documentation.                  Clinical Impression       Row Name 03/17/25 0857          Pain    Pretreatment Pain Rating 5/10  -     Posttreatment Pain Rating 6/10  -     Pain Location foot  -     Pain Side/Orientation bilateral  -     Pain Management Interventions exercise or physical activity utilized  -     Response to Pain Interventions activity participation with tolerable pain  -       Row Name 03/17/25 0857          Plan of Care Review    Plan of Care Reviewed With patient  -     Outcome Evaluation Pt is a 80 yo M admitted from home with SOA, fever, and weakness - multifocal PNA. Pt lives with his wife and reports independence at BL with no AD. Pt has no stairs at home, no recent falls, reporting 5/10 B foot pain. Pt sitting EOB on arrival, reports he's been getting up to the bathroom ad estrellita. Pt presents to PT with minimal functional deficits, appears to be likely close to his BL. Pt stood with SV, noted mild unsteadiness initially upon standing, benefitted from rwx use. Pt ambulated 150ft with SV-mod I using rwx, not additional LOB, gait distance limited by fatigue. Pt returned to room and agreeable to sitting UIC. Pt did not require any assist from PT to mobilize today, encouraged to continue walking with nsg vs his wife in the hallway using a rwx. Pt denies any further acute PT needs, discussed ordering a rwx for home if he continues to use it while admitted d/t B foot pain and pt verbalized understanding. PT will sign-off.  -       Row Name 03/17/25 0857          Therapy Assessment/Plan (PT)    Criteria for  Skilled Interventions Met (PT) no;no problems identified which require skilled intervention  -     Therapy Frequency (PT) evaluation only  -       Row Name 03/17/25 0857          Vital Signs    O2 Delivery Pre Treatment room air  -     O2 Delivery Intra Treatment room air  -     O2 Delivery Post Treatment room air  -       Row Name 03/17/25 0857          Positioning and Restraints    Pre-Treatment Position in bed  -     Post Treatment Position chair  -     In Chair notified nsg;sitting;call light within reach;encouraged to call for assist  -               User Key  (r) = Recorded By, (t) = Taken By, (c) = Cosigned By      Initials Name Provider Type    Lay Le, PT Physical Therapist                   Outcome Measures       Row Name 03/17/25 0903 03/17/25 0805       How much help from another person do you currently need...    Turning from your back to your side while in flat bed without using bedrails? 4  - 4  -DC    Moving from lying on back to sitting on the side of a flat bed without bedrails? 4  - 4  -DC    Moving to and from a bed to a chair (including a wheelchair)? 4  - 4  -DC    Standing up from a chair using your arms (e.g., wheelchair, bedside chair)? 4  - 3  -DC    Climbing 3-5 steps with a railing? 3  - 2  -DC    To walk in hospital room? 3  - 3  -DC    AM-PAC 6 Clicks Score (PT) 22  - 20  -DC    Highest Level of Mobility Goal 7 --> Walk 25 feet or more  - 6 --> Walk 10 steps or more  -[place]      Row Name 03/17/25 0903          Functional Assessment    Outcome Measure Options AM-PAC 6 Clicks Basic Mobility (PT)  -               User Key  (r) = Recorded By, (t) = Taken By, (c) = Cosigned By      Initials Name Provider Type    [place] Ange Stevens, RN Registered Nurse     Lay Haney PT Physical Therapist                                 Physical Therapy Education       Title: PT OT SLP Therapies (In Progress)       Topic: Physical Therapy (In Progress)        Point: Mobility training (Done)       Learning Progress Summary            Patient Acceptance, E,TB,D, VU by  at 3/17/2025 0903                      Point: Home exercise program (Not Started)       Learner Progress:  Not documented in this visit.              Point: Body mechanics (Done)       Learning Progress Summary            Patient Acceptance, E,TB,D, VU by  at 3/17/2025 0903                      Point: Precautions (Done)       Learning Progress Summary            Patient Acceptance, E,TB,D, VU by  at 3/17/2025 0903                                      User Key       Initials Effective Dates Name Provider Type Discipline     04/08/22 -  Lay Haney, PT Physical Therapist PT                  PT Recommendation and Plan     Outcome Evaluation: Pt is a 78 yo M admitted from home with SOA, fever, and weakness - multifocal PNA. Pt lives with his wife and reports independence at BL with no AD. Pt has no stairs at home, no recent falls, reporting 5/10 B foot pain. Pt sitting EOB on arrival, reports he's been getting up to the bathroom ad estrelltia. Pt presents to PT with minimal functional deficits, appears to be likely close to his BL. Pt stood with SV, noted mild unsteadiness initially upon standing, benefitted from rwx use. Pt ambulated 150ft with SV-mod I using rwx, not additional LOB, gait distance limited by fatigue. Pt returned to room and agreeable to sitting UIC. Pt did not require any assist from PT to mobilize today, encouraged to continue walking with nsg vs his wife in the hallway using a rwx. Pt denies any further acute PT needs, discussed ordering a rwx for home if he continues to use it while admitted d/t B foot pain and pt verbalized understanding. PT will sign-off.     Time Calculation:   PT Evaluation Complexity  History, PT Evaluation Complexity: 1-2 personal factors and/or comorbidities  Examination of Body Systems (PT Eval Complexity): total of 3 or more elements  Clinical  Presentation (PT Evaluation Complexity): stable  Clinical Decision Making (PT Evaluation Complexity): low complexity  Overall Complexity (PT Evaluation Complexity): low complexity     PT Charges       Row Name 03/17/25 0903             Time Calculation    Start Time 0815  -      Stop Time 0829  -      Time Calculation (min) 14 min  -      PT Received On 03/17/25  -         Time Calculation- PT    Total Timed Code Minutes- PT 12 minute(s)  -         Timed Charges    74663 - Gait Training Minutes  8  -      36088 - PT Therapeutic Activity Minutes 4  -         Total Minutes    Timed Charges Total Minutes 12  -       Total Minutes 12  -BH                User Key  (r) = Recorded By, (t) = Taken By, (c) = Cosigned By      Initials Name Provider Type     Lay Haney, PT Physical Therapist                  Therapy Charges for Today       Code Description Service Date Service Provider Modifiers Qty    18001561812 HC GAIT TRAINING EA 15 MIN 3/17/2025 Lay Haney, PT GP 1    50229681459 HC PT EVAL LOW COMPLEXITY 3 3/17/2025 Lay Haney, PT GP 1            PT G-Codes  Outcome Measure Options: AM-PAC 6 Clicks Basic Mobility (PT)  AM-PAC 6 Clicks Score (PT): 22  PT Discharge Summary  Anticipated Discharge Disposition (PT): home with assist    Lay Haney PT  3/17/2025

## 2025-03-17 NOTE — PLAN OF CARE
Problem: Adult Inpatient Plan of Care  Goal: Plan of Care Review  Outcome: Progressing  Flowsheets (Taken 3/17/2025 0749)  Progress: improving  Outcome Evaluation: Patient is alert and oriented x 4, room air, no complains of respiratory distress except productive cough. Fall risk prevention protocol maintained.  Plan of Care Reviewed With: patient   Goal Outcome Evaluation:  Plan of Care Reviewed With: patient        Progress: improving  Outcome Evaluation: Patient is alert and oriented x 4, room air, no complains of respiratory distress except productive cough. Fall risk prevention protocol maintained.                              Graft Donor Site Bandage (Optional-Leave Blank If You Don't Want In Note): Steri-strips and a pressure bandage were applied to the donor site.

## 2025-03-18 ENCOUNTER — READMISSION MANAGEMENT (OUTPATIENT)
Dept: CALL CENTER | Facility: HOSPITAL | Age: 80
End: 2025-03-18
Payer: MEDICARE

## 2025-03-18 VITALS
WEIGHT: 264 LBS | TEMPERATURE: 98.1 F | SYSTOLIC BLOOD PRESSURE: 122 MMHG | DIASTOLIC BLOOD PRESSURE: 65 MMHG | OXYGEN SATURATION: 98 % | HEIGHT: 72 IN | BODY MASS INDEX: 35.76 KG/M2 | HEART RATE: 77 BPM | RESPIRATION RATE: 17 BRPM

## 2025-03-18 PROBLEM — A41.9 SEPSIS: Status: RESOLVED | Noted: 2025-03-16 | Resolved: 2025-03-18

## 2025-03-18 LAB
ANION GAP SERPL CALCULATED.3IONS-SCNC: 11.2 MMOL/L (ref 5–15)
BUN SERPL-MCNC: 45 MG/DL (ref 8–23)
BUN/CREAT SERPL: 26.9 (ref 7–25)
CALCIUM SPEC-SCNC: 8.8 MG/DL (ref 8.6–10.5)
CHLORIDE SERPL-SCNC: 105 MMOL/L (ref 98–107)
CO2 SERPL-SCNC: 24.8 MMOL/L (ref 22–29)
CREAT SERPL-MCNC: 1.67 MG/DL (ref 0.76–1.27)
DEPRECATED RDW RBC AUTO: 41.3 FL (ref 37–54)
EGFRCR SERPLBLD CKD-EPI 2021: 41.9 ML/MIN/1.73
ERYTHROCYTE [DISTWIDTH] IN BLOOD BY AUTOMATED COUNT: 12.3 % (ref 12.3–15.4)
GLUCOSE SERPL-MCNC: 110 MG/DL (ref 65–99)
HCT VFR BLD AUTO: 32 % (ref 37.5–51)
HGB BLD-MCNC: 10.8 G/DL (ref 13–17.7)
MCH RBC QN AUTO: 31.1 PG (ref 26.6–33)
MCHC RBC AUTO-ENTMCNC: 33.8 G/DL (ref 31.5–35.7)
MCV RBC AUTO: 92.2 FL (ref 79–97)
PLATELET # BLD AUTO: 446 10*3/MM3 (ref 140–450)
PMV BLD AUTO: 9.3 FL (ref 6–12)
POTASSIUM SERPL-SCNC: 5.2 MMOL/L (ref 3.5–5.2)
RBC # BLD AUTO: 3.47 10*6/MM3 (ref 4.14–5.8)
SODIUM SERPL-SCNC: 141 MMOL/L (ref 136–145)
WBC NRBC COR # BLD AUTO: 19.29 10*3/MM3 (ref 3.4–10.8)

## 2025-03-18 PROCEDURE — 80048 BASIC METABOLIC PNL TOTAL CA: CPT | Performed by: INTERNAL MEDICINE

## 2025-03-18 PROCEDURE — 25010000002 CEFTRIAXONE PER 250 MG: Performed by: STUDENT IN AN ORGANIZED HEALTH CARE EDUCATION/TRAINING PROGRAM

## 2025-03-18 PROCEDURE — 85027 COMPLETE CBC AUTOMATED: CPT | Performed by: INTERNAL MEDICINE

## 2025-03-18 RX ORDER — CEFPODOXIME PROXETIL 200 MG/1
200 TABLET, FILM COATED ORAL
Qty: 8 TABLET | Refills: 0 | Status: SHIPPED | OUTPATIENT
Start: 2025-03-19 | End: 2025-03-23

## 2025-03-18 RX ORDER — ACETAMINOPHEN 325 MG/1
650 TABLET ORAL EVERY 6 HOURS PRN
Start: 2025-03-18

## 2025-03-18 RX ADMIN — TORSEMIDE 5 MG: 10 TABLET ORAL at 08:39

## 2025-03-18 RX ADMIN — AMLODIPINE BESYLATE 10 MG: 5 TABLET ORAL at 08:39

## 2025-03-18 RX ADMIN — BENZONATATE 200 MG: 100 CAPSULE ORAL at 07:10

## 2025-03-18 RX ADMIN — FERROUS SULFATE TAB 325 MG (65 MG ELEMENTAL FE) 325 MG: 325 (65 FE) TAB at 08:39

## 2025-03-18 RX ADMIN — THERA TABS 1 TABLET: TAB at 08:39

## 2025-03-18 RX ADMIN — HYDROCHLOROTHIAZIDE 12.5 MG: 12.5 TABLET ORAL at 08:39

## 2025-03-18 RX ADMIN — SODIUM CHLORIDE 2000 MG: 9 INJECTION, SOLUTION INTRAVENOUS at 11:22

## 2025-03-18 RX ADMIN — Medication 1000 MCG: at 08:39

## 2025-03-18 RX ADMIN — MAGNESIUM OXIDE TAB 400 MG (241.3 MG ELEMENTAL MG) 400 MG: 400 (241.3 MG) TAB at 08:39

## 2025-03-18 RX ADMIN — GUAIFENESIN 1200 MG: 600 TABLET, EXTENDED RELEASE ORAL at 08:39

## 2025-03-18 RX ADMIN — AZITHROMYCIN 500 MG: 250 TABLET, FILM COATED ORAL at 08:39

## 2025-03-18 NOTE — PROGRESS NOTES
Continued Stay Note  [facility]     Patient Name: Dileep Hamilton  MRN: [id number]  Today's Date: 3/18/2025    Admit Date: 3/16/2025    Plan: Return home with spouse   Discharge Plan       Row Name 03/18/25 1326       Plan    Patient/Family in Agreement with Plan yes    Plan Comments Patient is requesting walker for home use.  Order received.  Spoke with [employer] and they will provide.  Loree JONES      Row Name 03/18/25 9375       Plan    Plan Return home with spouse                 Expected Discharge Date and Time       Expected Discharge Date Expected Discharge Time    Mar 18, 2025               Becky S. [place], RN

## 2025-03-18 NOTE — DISCHARGE SUMMARY
[facility] Medicine Services  DISCHARGE SUMMARY    Patient Name: Dileep Hamilton  : 1945  MRN: [id number]    Date of Admission: 3/16/2025  9:14 AM  Date of Discharge:   3/18/2025  Primary Care Physician: Juan A Rubi MD    Consults       Date and Time Order Name Status Description    3/16/2025 10:23 AM A (on-call MD unless specified) Details              Hospital Course       Active Hospital Problems    Diagnosis  POA    Bacterial pneumonia [J15.9]  Yes    Class 2 severe obesity with serious comorbidity in adult [E66.812, E66.01]  Yes    Elevated troponin [R79.89]  Yes    CKD stage 3b, GFR 30-44 ml/min [N18.32]  Yes    Benign prostatic hyperplasia without lower urinary tract symptoms [N40.0]  Yes    Essential hypertension [I10]  Yes    Hyperlipidemia [E78.5]  Yes    Diabetes type 2, controlled [E11.9]  Yes      Resolved Hospital Problems    Diagnosis Date Resolved POA    **Sepsis [A41.9] 2025 Yes          Hospital Course:  Dileep Hamilton is a 79 y.o. male  presents the hospital with sepsis due to multifocal bacterial pneumonia.     Pneumonia:  Patient found to have findings of sepsis from what is thought to be bacterial pneumonia.  His procalcitonin is elevated and he had initial white blood cell count of 22,000.  Infiltrates were present on his chest x-ray bilaterally which I personally reviewed.  He was started on broad-spectrum antibiotics and his cough and sputum have drastically improved.  He will discharge on Vantin could not complete his antibiotic course.  He is not currently hypoxic and is hemodynamically stable.  Sepsis has resolved.    Hyperkalemia:  Patient has some elevation in his potassium level which has returned to the upper end of the normal range today.  He was placed on low potassium diet and was recommended to continue this at discharge.  I recommend a BMP at his primary care follow-up to reassess his potassium level.    Weakness: This was generalized weakness and has  improved with treatment of his infection.  PT has cleared him to go home.    The remainder of his medical problems are reasonably stable.    At the time of discharge patient was told to take all medications as prescribed, keep all follow-up appointments, and call their doctor or return to the hospital with any worsening or concerning symptoms.    Please note that this note was made using Dragon voice recognition software            Day of Discharge     Subjective:  Patient feels much better today.  His cough is drastically decreased.  His strength is improved.  He feels ready to go home.  He is breathing comfortably on room air oxygen.  He agrees to call his primary care provider today and follow-up within 1 week for general hospital follow-up.  No other new complaints.    No current fevers or chills  No current nausea, vomiting, or diarrhea  No current chest pain or palpitations      Vital Signs:   Temp:  [98.1 °F (36.7 °C)-98.4 °F (36.9 °C)] 98.1 °F (36.7 °C)  Heart Rate:  [64-77] 77  Resp:  [17-18] 17  BP: (122-144)/(65-69) 122/65     Physical Exam:    Constitutional: Awake, alert  HENT: NCAT, mucous membranes moist, neck supple  Respiratory: No cough and rare coarse sounds are present  Cardiovascular: Pulse rate is normal, palpable radial pulses  Gastrointestinal:  Soft, nontender, nondistended  Musculoskeletal: Obese, BMI is 36  Psychiatric: Appropriate affect, cooperative, conversational  Neurologic: No slurred speech or facial droop, follows commands  Skin: No rashes or jaundice, warm    Pertinent  and/or Most Recent Results     Results from last 7 days   Lab Units 03/18/25  0627 03/17/25  0514 03/16/25  0921   WBC 10*3/mm3 19.29* 25.26* 22.35*   HEMOGLOBIN g/dL 10.8* 10.6* 11.2*   HEMATOCRIT % 32.0* 31.2* 33.9*   PLATELETS 10*3/mm3 446 394 392   SODIUM mmol/L 141 136 136   POTASSIUM mmol/L 5.2 5.4* 4.9   CHLORIDE mmol/L 105 102 98   CO2 mmol/L 24.8 22.7 22.7   BUN mg/dL 45* 38* 31*   CREATININE mg/dL 1.67*  "1.68* 1.82*   GLUCOSE mg/dL 110* 168* 124*   CALCIUM mg/dL 8.8 9.1 8.9     Results from last 7 days   Lab Units 03/17/25  0514 03/16/25  0921   BILIRUBIN mg/dL 0.3 0.8   ALK PHOS U/L 117 136*   ALT (SGPT) U/L 51* 65*   AST (SGOT) U/L 33 44*           Invalid input(s): \"TG\", \"LDLCALC\", \"LDLREALC\"  Results from last 7 days   Lab Units 03/16/25  1026 03/16/25  0921   PROBNP pg/mL  --  785.0   HSTROP T ng/L 57* 55*   PROCALCITONIN ng/mL  --  0.36*   LACTATE mmol/L 1.7  --        Brief Urine Lab Results  (Last result in the past 365 days)        Color   Clarity   Blood   Leuk Est   Nitrite   Protein   CREAT   Urine HCG        05/10/24 0844 Yellow   Clear   Negative   Negative   Negative   Negative           05/10/24 0844             78.1               Imaging Results (All)       Procedure Component Value Units Date/Time    XR Chest 1 View [[id number]] Collected: 03/16/25 0936     Updated: 03/16/25 0941    Narrative:      XR CHEST 1 VW-        INDICATION: Shortness of air     COMPARISON: None     TECHNIQUE: 1 view chest     FINDINGS:      Calcified pulmonary nodules and calcified mediastinal and hilar lymph  nodes, consistent with prior granulomatous infection. Linear right  perihilar and bibasilar opacities. No effusions. Cardiac silhouette is  at upper limits in size for AP technique.       Impression:         1. Linear right perihilar and bibasilar opacities are most consistent  with subsegmental atelectasis.  2. Findings of old granulomatous infection.     This report was finalized on 3/16/2025 9:38 AM by Dr. Po Perez M.D on Workstation: [id number]               Results for orders placed during the hospital encounter of 03/22/23    Duplex Renal Artery - Bilateral Complete CAR    Interpretation Summary    Normal right renal artery.    Normal left renal artery.      Results for orders placed during the hospital encounter of 03/22/23    Duplex Renal Artery - Bilateral Complete CAR    Interpretation Summary    " Normal right renal artery.    Normal left renal artery.      Results for orders placed during the hospital encounter of 03/16/25    Adult Transthoracic Echo Complete W/ Cont if Necessary Per Protocol    Interpretation Summary    Left ventricular systolic function is hyperdynamic (EF > 70%). Calculated left ventricular EF = 74.6%    The left ventricular cavity is borderline dilated.    Left ventricular wall thickness is consistent with mild concentric hypertrophy.    Left ventricular diastolic function is consistent with (grade II w/high LAP) pseudonormalization.    Mild aortic valve stenosis is present. Peak velocity of the flow distal to the aortic valve is 265.8 cm/s. Aortic valve mean pressure gradient is 18 mmHg. Aortic valve dimensionless index is 0.63. Aortic valve area is 2 cm2.    Posterior mitral annular calcification (MAC).    Normal biatrial and IVC size.         Discharge Details        Discharge Medications        New Medications        Instructions Start Date   acetaminophen 325 MG tablet  Commonly known as: TYLENOL   650 mg, Oral, Every 6 Hours PRN      cefpodoxime 200 MG tablet  Commonly known as: VANTIN   200 mg, Oral, Daily With Breakfast & Lunch   Start Date: March 19, 2025            Continue These Medications        Instructions Start Date   amLODIPine 5 MG tablet  Commonly known as: NORVASC   10 mg, Oral, Daily      CoQ-10 100 MG capsule   100 mg, Daily      ferrous sulfate 325 (65 FE) MG tablet   325 mg, 4 Times Weekly      fish oil 1000 MG capsule capsule   1,000 mg, Daily With Breakfast      hydroCHLOROthiazide 12.5 MG tablet   12.5 mg, Oral, Daily      Ibuprofen 3 %, Gabapentin 10 %, Baclofen 2 %, lidocaine 4 %   1-2 g, Topical, 3 to 4 Times Daily      multivitamin tablet tablet  Commonly known as: THERAGRAN   1 tablet, Daily      olmesartan 40 MG tablet  Commonly known as: BENICAR   Take 1 tablet by mouth once daily      Pitavastatin Magnesium 1 MG tablet   1 mg, Oral, Daily      torsemide  5 MG tablet  Commonly known as: DEMADEX   5 mg, Oral, Daily      vitamin B-12 1000 MCG tablet  Commonly known as: CYANOCOBALAMIN   1,000 mcg, Daily      Vitamin D3 20 MCG (800 UNIT) tablet   800 Int'l Units, Oral, Daily               Allergies   Allergen Reactions    Statins          Discharge Disposition:  Home or Self Care    Diet:  Hospital:  Diet Order   Procedures    Diet: Diabetic, Renal; Consistent Carbohydrate; Low Potassium; Fluid Consistency: Thin (IDDSI 0)       Activity:  Activity Instructions       Activity as Tolerated                   CODE STATUS:    Code Status and Medical Interventions: CPR (Attempt to Resuscitate); Full   Ordered at: 03/16/25 1109     Code Status (Patient has no pulse and is not breathing):    CPR (Attempt to Resuscitate)     Medical Interventions (Patient has pulse or is breathing):    Full     Level Of Support Discussed With:    Patient       Future Appointments   Date Time Provider Department Center   4/7/2025  3:30 PM Naye Alexis MD [facility] None   5/13/2025  4:00 PM LAB CHAIR 4 Psych[facility]   5/13/2025  4:20 PM Ruth Salter MD [facility]   5/16/2025  8:30 AM [facility]   5/23/2025 11:15 AM Juan A Rubi MD [facility]            Additional Instructions for the Follow-ups that You Need to Schedule       Discharge Follow-up with PCP   As directed       Currently Documented PCP:    Juan A Rubi MD    PCP Phone Number:    [phone]     Follow Up Details: 1 week, call today               Contact information for follow-up providers       Juan A Rubi MD .    Specialty: Internal Medicine  Why: 1 week, call today  Contact information:  [address] [id number] [phone]                       Contact information for after-discharge care       Durable Medical Equipment       [facility] .    Service: Durable Medical Equipment  Contact information:  [address]  [facility] [address]  [phone]                                       Jordy Gilmore MD  03/18/25      Time Spent on Discharge:  I spent greater than 35 minutes on this discharge activity which included: face-to-face encounter with the patient, reviewing the data in the system, coordination of the care with the nursing staff as well as consultants, documentation, and entering orders.

## 2025-03-18 NOTE — PROGRESS NOTES
Case Management Discharge Note      Final Note: DC'd home         Selected Continued Care - Discharged on 3/18/2025 Admission date: 3/16/2025 - Discharge disposition: Home or Self Care                   Durable Medical Equipment Coordination complete.      Service Provider Services Address Phone Fax Patient Preferred    [facility] [address], [facility] [address] [phone] --                          Selected Continued Care - Episodes Includes continued care and service providers with selected services from the active episodes listed below          Transportation Services  Private: Car    Final Discharge Disposition Code: 01 - home or self-care

## 2025-03-18 NOTE — OUTREACH NOTE
Prep Survey      Flowsheet Row Responses   [facility] patient discharged from? [place]   Is LACE score < 7 ? No   Eligibility [facility]   Date of Admission 03/16/25   Date of Discharge 03/18/25   Discharge Disposition Home or Self Care   Discharge diagnosis sepsis due to multifocal bacterial pneumonia.   Does the patient have one of the following disease processes/diagnoses(primary or secondary)? Sepsis   Does the patient have Home health ordered? No   Is there a DME ordered? Yes   What DME was ordered? walker   Prep survey completed? Yes            Tete BRIGHT - Registered Nurse

## 2025-03-18 NOTE — PLAN OF CARE
Goal Outcome Evaluation:  Plan of Care Reviewed With: patient        Progress: improving  Outcome Evaluation: Pt on RA. No complaints this shift. Rested well. VSS. Possible discharge today.

## 2025-03-18 NOTE — DISCHARGE PLACEMENT REQUEST
"Dileep Hamilton (79 y.o. Male)       Date of Birth   1945    Social Security Number       Address   [address] [place], [place] [id number]    Home Phone   [phone]    MRN   [id number]       Anabaptist   Baptism    Marital Status                               Admission Date   3/16/2025    Admission Type   Emergency    Admitting Provider   Barrington Marinelli MD    Attending Provider   Jordy Gilmore MD    Department, Room/Bed   [address], S615/1       Discharge Date       Discharge Disposition   Home or Self Care    Discharge Destination                                 Attending Provider: Jordy Gilmore MD    Allergies: Statins    Isolation: None   Infection: None   Code Status: CPR    Ht: 182.9 cm (72\")   Wt: 120 kg (264 lb)    Admission Cmt: None   Principal Problem: Sepsis [A41.9]                   Active Insurance as of 3/16/2025       Primary Coverage       Payor Plan Insurance Group Employer/Plan Group    MEDICARE MEDICARE A & B        Payor Plan Address Payor Plan Phone Number Payor Plan Fax Number Effective Dates    [address] [phone]  6/1/2012 - None Entered    [facility] [id number]         Subscriber Name Subscriber Birth Date Member ID       DILEEP HAMILTON 1945 [id number]               Secondary Coverage       Payor Plan Insurance Group Employer/Plan Group    ANTHEM BLUE CROSS [facility] SUPP [id number]       Payor Plan Address Payor Plan Phone Number Payor Plan Fax Number Effective Dates    [address]   1/1/2013 - None Entered    [facility] [id number]         Subscriber Name Subscriber Birth Date Member ID       DILEEP HAMILTON 1945 [id number]                     Emergency Contacts        (Rel.) Home Phone Work Phone Mobile Phone    Tana Hamilton (Spouse) [phone] -- [phone]                "

## 2025-03-19 ENCOUNTER — TRANSITIONAL CARE MANAGEMENT TELEPHONE ENCOUNTER (OUTPATIENT)
Dept: CALL CENTER | Facility: HOSPITAL | Age: 80
End: 2025-03-19
Payer: MEDICARE

## 2025-03-19 NOTE — OUTREACH NOTE
Call Center TCM Note      Flowsheet Row Responses   [facility] patient discharged from? [place]   Does the patient have one of the following disease processes/diagnoses(primary or secondary)? Sepsis   TCM attempt successful? Yes   Call start time 1613   Call end time 1616   Discharge diagnosis sepsis due to multifocal bacterial pneumonia.   Is patient permission given to speak with other caregiver? Yes   List who call center can speak with spouse- Tana   Person spoke with today (if not patient) and relationship spouse- Tana   Meds reviewed with patient/caregiver? Yes   Is the patient having any side effects they believe may be caused by any medication additions or changes? No   Does the patient have all medications related to this admission filled (includes all antibiotics, inhalers, nebulizers,steroids,etc.) Yes   Is the patient taking all medications as directed (includes completed medication regime)? Yes   Comments Hospital follow up appt with PCP Dr. Rubi on 3/25   Does the patient have an appointment with their PCP within 7-14 days of discharge? Yes   Has home health visited the patient within 72 hours of discharge? N/A   What DME was ordered? walker   Has all DME been delivered? Yes   Psychosocial issues? No   Did the patient receive a copy of their discharge instructions? Yes   Nursing interventions Reviewed instructions with patient   What is the patient's perception of their health status since discharge? Improving   Nursing interventions Nurse provided patient education   Is the patient/caregiver able to teach back TIME? T emperature - higher or lower than normal, I nfection - may have signs and symptoms of an infection, M ental Decline - confused, sleepy, difficult to arouse, E xtremely Ill - severe pain, discomfort, shortness of breath   Nursing interventions Nurse provided patient education   Is patient/caregiver able to teach back steps to recovery at home? Rest and regain strength, Set  small, achievable goals for return to baseline health   Is the patient/caregiver able to teach back signs and symptoms of worsening condition: Fever   If the patient is a current smoker, are they able to teach back resources for cessation? Not a smoker   Is the patient/caregiver able to teach back the hierarchy of who to call/visit for symptoms/problems? PCP, Specialist, Home health nurse, Urgent Care, ED, 911 Yes   TCM call completed? Yes   Wrap up additional comments Per spouse, patient is doing much better, all questions addressed, confirmed hospital follow up appt with PCP Dr. Rubi for 3/25.   Call end time 1616   Would this patient benefit from a Referral to [facility] Social Work? No   Is the patient interested in additional calls from an ambulatory ? No            Lanie Sales RN    3/19/2025, 16:16 EDT

## 2025-03-19 NOTE — OUTREACH NOTE
Call Center TCM Note      Flowsheet Row Responses   [facility] patient discharged from? [place]   Does the patient have one of the following disease processes/diagnoses(primary or secondary)? Sepsis   TCM attempt successful? No   Unsuccessful attempts Attempt 1            Silvia De Leon MA    3/19/2025, 15:08 EDT

## 2025-03-21 LAB
BACTERIA SPEC AEROBE CULT: NORMAL
BACTERIA SPEC AEROBE CULT: NORMAL

## 2025-03-22 ENCOUNTER — HOSPITAL ENCOUNTER (EMERGENCY)
Facility: HOSPITAL | Age: 80
Discharge: HOME | End: 2025-03-22
Attending: EMERGENCY MEDICINE
Payer: MEDICARE

## 2025-03-22 VITALS
DIASTOLIC BLOOD PRESSURE: 79 MMHG | HEIGHT: 72 IN | BODY MASS INDEX: 35.21 KG/M2 | OXYGEN SATURATION: 99 % | TEMPERATURE: 96.9 F | RESPIRATION RATE: 20 BRPM | HEART RATE: 90 BPM | WEIGHT: 260 LBS | SYSTOLIC BLOOD PRESSURE: 153 MMHG

## 2025-03-22 DIAGNOSIS — N18.9 CHRONIC RENAL IMPAIRMENT, UNSPECIFIED CKD STAGE: ICD-10-CM

## 2025-03-22 DIAGNOSIS — E87.5 HYPERKALEMIA: Primary | ICD-10-CM

## 2025-03-22 LAB
ALBUMIN SERPL-MCNC: 3.4 G/DL (ref 3.5–5.2)
ALBUMIN/GLOB SERPL: 0.9 G/DL
ALP SERPL-CCNC: 115 U/L (ref 39–117)
ALT SERPL W P-5'-P-CCNC: 55 U/L (ref 1–41)
ANION GAP SERPL CALCULATED.3IONS-SCNC: 11.2 MMOL/L (ref 5–15)
AST SERPL-CCNC: 28 U/L (ref 1–40)
BASOPHILS # BLD AUTO: 0.04 10*3/MM3 (ref 0–0.2)
BASOPHILS NFR BLD AUTO: 0.3 % (ref 0–1.5)
BILIRUB SERPL-MCNC: 0.2 MG/DL (ref 0–1.2)
BUN SERPL-MCNC: 37 MG/DL (ref 8–23)
BUN/CREAT SERPL: 22.3 (ref 7–25)
CALCIUM SPEC-SCNC: 9.1 MG/DL (ref 8.6–10.5)
CHLORIDE SERPL-SCNC: 99 MMOL/L (ref 98–107)
CO2 SERPL-SCNC: 26.8 MMOL/L (ref 22–29)
CREAT SERPL-MCNC: 1.66 MG/DL (ref 0.76–1.27)
DEPRECATED RDW RBC AUTO: 42.2 FL (ref 37–54)
EGFRCR SERPLBLD CKD-EPI 2021: 42.2 ML/MIN/1.73
EOSINOPHIL # BLD AUTO: 0.37 10*3/MM3 (ref 0–0.4)
EOSINOPHIL NFR BLD AUTO: 2.5 % (ref 0.3–6.2)
ERYTHROCYTE [DISTWIDTH] IN BLOOD BY AUTOMATED COUNT: 12.4 % (ref 12.3–15.4)
GLOBULIN UR ELPH-MCNC: 4 GM/DL
GLUCOSE SERPL-MCNC: 137 MG/DL (ref 65–99)
HCT VFR BLD AUTO: 36.3 % (ref 37.5–51)
HGB BLD-MCNC: 12.2 G/DL (ref 13–17.7)
HOLD SPECIMEN: NORMAL
HOLD SPECIMEN: NORMAL
IMM GRANULOCYTES # BLD AUTO: 0.14 10*3/MM3 (ref 0–0.05)
IMM GRANULOCYTES NFR BLD AUTO: 0.9 % (ref 0–0.5)
LYMPHOCYTES # BLD AUTO: 3.84 10*3/MM3 (ref 0.7–3.1)
LYMPHOCYTES NFR BLD AUTO: 26 % (ref 19.6–45.3)
MCH RBC QN AUTO: 31.5 PG (ref 26.6–33)
MCHC RBC AUTO-ENTMCNC: 33.6 G/DL (ref 31.5–35.7)
MCV RBC AUTO: 93.8 FL (ref 79–97)
MONOCYTES # BLD AUTO: 0.74 10*3/MM3 (ref 0.1–0.9)
MONOCYTES NFR BLD AUTO: 5 % (ref 5–12)
NEUTROPHILS NFR BLD AUTO: 65.3 % (ref 42.7–76)
NEUTROPHILS NFR BLD AUTO: 9.64 10*3/MM3 (ref 1.7–7)
NRBC BLD AUTO-RTO: 0 /100 WBC (ref 0–0.2)
PLATELET # BLD AUTO: 583 10*3/MM3 (ref 140–450)
PMV BLD AUTO: 8.7 FL (ref 6–12)
POTASSIUM SERPL-SCNC: 5.3 MMOL/L (ref 3.5–5.2)
PROT SERPL-MCNC: 7.4 G/DL (ref 6–8.5)
QT INTERVAL: 412 MS
QTC INTERVAL: 437 MS
RBC # BLD AUTO: 3.87 10*6/MM3 (ref 4.14–5.8)
SODIUM SERPL-SCNC: 137 MMOL/L (ref 136–145)
WBC NRBC COR # BLD AUTO: 14.77 10*3/MM3 (ref 3.4–10.8)
WHOLE BLOOD HOLD COAG: NORMAL
WHOLE BLOOD HOLD SPECIMEN: NORMAL

## 2025-03-22 PROCEDURE — 93010 ELECTROCARDIOGRAM REPORT: CPT | Performed by: INTERNAL MEDICINE

## 2025-03-22 PROCEDURE — 93005 ELECTROCARDIOGRAM TRACING: CPT | Performed by: EMERGENCY MEDICINE

## 2025-03-22 PROCEDURE — 80053 COMPREHEN METABOLIC PANEL: CPT | Performed by: EMERGENCY MEDICINE

## 2025-03-22 PROCEDURE — 99283 EMERGENCY DEPT VISIT LOW MDM: CPT

## 2025-03-22 PROCEDURE — 85025 COMPLETE CBC W/AUTO DIFF WBC: CPT | Performed by: EMERGENCY MEDICINE

## 2025-03-22 PROCEDURE — 93005 ELECTROCARDIOGRAM TRACING: CPT

## 2025-03-22 NOTE — DISCHARGE INSTRUCTIONS
Follow-up with Dr. Machuca.  Call his office on Monday.  Try to limit your potassium intake (see attached information on kidney disease eating plan)

## 2025-03-22 NOTE — ED PROVIDER NOTES
EMERGENCY DEPARTMENT ENCOUNTER    Room Number:  27/27  PCP: Juan A Rubi MD  Historian: Patient, spouse    I initially evaluated the patient at 1646    HPI:  Chief Complaint: Elevated potassium  A complete HPI/ROS/PMH/PSH/SH/FH are unobtainable due to: Nothing  Context: Dileep Hamilton is a 79 y.o. male with a medical history of hypertension, chronic kidney disease who presents to the ED c/o acute elevated potassium.  Patient saw his nephrologist yesterday and had labs drawn.  He was contacted earlier today and told that his potassium was 6.3.  He was advised to come to the ED for further evaluation.  He feels well and denies chest pain, shortness of breath, nausea, vomiting, diarrhea, or muscle cramps.            PAST MEDICAL HISTORY  Active Ambulatory Problems     Diagnosis Date Noted    Malignant neoplasm of colon 01/26/2016    Essential hypertension 01/26/2016    Diabetes type 2, controlled 01/26/2016    Vitamin D deficiency 01/26/2016    Hyperlipidemia 01/26/2016    Venous stasis dermatitis 01/26/2016    Lymphocytosis 01/26/2016    Obesity (BMI 30.0-34.9) 01/26/2016    Squamous cell carcinoma of skin 01/26/2016    Murmur, cardiac 03/03/2016    Aortic valve calcification 08/09/2017    Macular pucker of both eyes 09/21/2018    Primary osteoarthritis of both hips 10/25/2021    Benign prostatic hyperplasia without lower urinary tract symptoms 06/09/2022    Lumbar foraminal stenosis 12/27/2022    Suspected sleep apnea 08/23/2023    CKD stage 3b, GFR 30-44 ml/min 01/08/2025    Elevated troponin 03/16/2025    Bacterial pneumonia 03/17/2025    Class 2 severe obesity with serious comorbidity in adult 03/17/2025     Resolved Ambulatory Problems     Diagnosis Date Noted    Impaired fasting glucose 01/26/2016    Proteinuria 01/26/2016    Colon cancer     History of malignant neoplasm of rectum, rectosigmoid junction, and anus 01/05/2015    Lung mass 11/12/2015    Healthcare maintenance 01/19/2017    Shingles outbreak  10/15/2017    Lumbar radiculitis 12/27/2022    Trochanteric bursitis of left hip 03/24/2023    Sepsis 03/16/2025     Past Medical History:   Diagnosis Date    Arthritis Hips    Benign prostatic hyperplasia 2    Borderline diabetes     Bursitis of hip 09/2002    Cataract Right eye 2020    Family history of blood clots     Heart murmur 2016    Hernia Abdomen    Hip arthrosis 09/2002    History of cataract     History of shingles     Hypertension     IFG (impaired fasting glucose)     Leukocytosis     Obesity     Personal history of scoliosis     Rotator cuff syndrome     Scoliosis Lower back    Squamous cell carcinoma     Stasis dermatitis     Visual impairment Cataracts         PAST SURGICAL HISTORY  Past Surgical History:   Procedure Laterality Date    BURSECTOMY Left 04/19/2023    CATARACT EXTRACTION Right 05/2020    CATARACT EXTRACTION Left 11/02/2022    COLON SURGERY  12/05/2013    COLONOSCOPY  2018    EPIDURAL Left 12/29/2022    Procedure: LUMBAR/SACRAL TRANSFORAMINAL EPIDURAL Left L4-5;  Surgeon: Tori Sloitario MD;  Location: [facility] MAIN OR;  Service: Pain Management;  Laterality: Left;    SUBTOTAL COLECTOMY  12/05/2014    colon ca 2014; R side of colon only    VASECTOMY      1978         FAMILY HISTORY  Family History   Problem Relation Age of Onset    Heart disease Mother     Diabetes Mother     Hypertension Mother     Lung cancer Mother     COPD Mother         `    Asthma Mother     Anesthesia problems Mother     Arthritis Mother     Arthritis Father     Stroke Father     Diabetes Father     Heart disease Father     Diabetes type II Sister     Diabetes type II Brother     No Known Problems Son     Diabetes Maternal Grandmother     Clotting disorder Other         Family history of blood clots         SOCIAL HISTORY  Social History     Socioeconomic History    Marital status:      Spouse name: Tana    Number of children: 1   Tobacco Use    Smoking status: Never    Smokeless tobacco: Never   Vaping Use     Vaping status: Never Used   Substance and Sexual Activity    Alcohol use: No    Drug use: No    Sexual activity: Yes     Partners: Female     Birth control/protection: None, Vasectomy     Comment: wife only; no hx STD's         ALLERGIES  Statins    REVIEW OF SYSTEMS  Review of Systems  Included in HPI  All systems reviewed and negative except for those discussed in HPI.      PHYSICAL EXAM  ED Triage Vitals   Temp Heart Rate Resp BP SpO2   03/22/25 1514 03/22/25 1514 03/22/25 1514 03/22/25 1515 03/22/25 1514   96.9 °F (36.1 °C) 90 20 153/79 99 %      Temp src Heart Rate Source Patient Position BP Location FiO2 (%)   -- -- -- -- --              Physical Exam      GENERAL: Awake, alert, oriented x 3.  Nontoxic-appearing elderly male.  Resting comfortably in no acute distress  HENT: NCAT, nares patent  EYES: no scleral icterus  CV: regular rhythm, normal rate  RESPIRATORY: normal effort, clear to auscultation bilaterally  ABDOMEN: soft  MUSCULOSKELETAL: Extremities are nontender with full range of motion.  There is 2+ edema in both lower legs  NEURO: Speech is normal.  No facial droop.  PSYCH:  calm, cooperative  SKIN: warm, dry    Vital signs and nursing notes reviewed.          LAB RESULTS  Recent Results (from the past 24 hours)   ECG 12 Lead Other; abnormal lab    Collection Time: 03/22/25  3:26 PM   Result Value Ref Range    QT Interval 412 ms    QTC Interval 437 ms   Comprehensive Metabolic Panel    Collection Time: 03/22/25  4:41 PM    Specimen: Blood   Result Value Ref Range    Glucose 137 (H) 65 - 99 mg/dL    BUN 37 (H) 8 - 23 mg/dL    Creatinine 1.66 (H) 0.76 - 1.27 mg/dL    Sodium 137 136 - 145 mmol/L    Potassium 5.3 (H) 3.5 - 5.2 mmol/L    Chloride 99 98 - 107 mmol/L    CO2 26.8 22.0 - 29.0 mmol/L    Calcium 9.1 8.6 - 10.5 mg/dL    Total Protein 7.4 6.0 - 8.5 g/dL    Albumin 3.4 (L) 3.5 - 5.2 g/dL    ALT (SGPT) 55 (H) 1 - 41 U/L    AST (SGOT) 28 1 - 40 U/L    Alkaline Phosphatase 115 39 - 117 U/L     Total Bilirubin 0.2 0.0 - 1.2 mg/dL    Globulin 4.0 gm/dL    A/G Ratio 0.9 g/dL    BUN/Creatinine Ratio 22.3 7.0 - 25.0    Anion Gap 11.2 5.0 - 15.0 mmol/L    eGFR 42.2 (L) >60.0 mL/min/1.73   Green Top (Gel)    Collection Time: 03/22/25  4:41 PM   Result Value Ref Range    Extra Tube Hold for add-ons.    Lavender Top    Collection Time: 03/22/25  4:41 PM   Result Value Ref Range    Extra Tube hold for add-on    CBC Auto Differential    Collection Time: 03/22/25  4:41 PM    Specimen: Blood   Result Value Ref Range    WBC 14.77 (H) 3.40 - 10.80 10*3/mm3    RBC 3.87 (L) 4.14 - 5.80 10*6/mm3    Hemoglobin 12.2 (L) 13.0 - 17.7 g/dL    Hematocrit 36.3 (L) 37.5 - 51.0 %    MCV 93.8 79.0 - 97.0 fL    MCH 31.5 26.6 - 33.0 pg    MCHC 33.6 31.5 - 35.7 g/dL    RDW 12.4 12.3 - 15.4 %    RDW-SD 42.2 37.0 - 54.0 fl    MPV 8.7 6.0 - 12.0 fL    Platelets 583 (H) 140 - 450 10*3/mm3    Neutrophil % 65.3 42.7 - 76.0 %    Lymphocyte % 26.0 19.6 - 45.3 %    Monocyte % 5.0 5.0 - 12.0 %    Eosinophil % 2.5 0.3 - 6.2 %    Basophil % 0.3 0.0 - 1.5 %    Immature Grans % 0.9 (H) 0.0 - 0.5 %    Neutrophils, Absolute 9.64 (H) 1.70 - 7.00 10*3/mm3    Lymphocytes, Absolute 3.84 (H) 0.70 - 3.10 10*3/mm3    Monocytes, Absolute 0.74 0.10 - 0.90 10*3/mm3    Eosinophils, Absolute 0.37 0.00 - 0.40 10*3/mm3    Basophils, Absolute 0.04 0.00 - 0.20 10*3/mm3    Immature Grans, Absolute 0.14 (H) 0.00 - 0.05 10*3/mm3    nRBC 0.0 0.0 - 0.2 /100 WBC   Gray Top    Collection Time: 03/22/25  4:41 PM   Result Value Ref Range    Extra Tube Hold for add-ons.    Light Blue Top    Collection Time: 03/22/25  4:42 PM   Result Value Ref Range    Extra Tube Hold for add-ons.        Ordered the above labs and reviewed the results.        RADIOLOGY  No Radiology Exams Resulted Within Past 24 Hours    Ordered the above noted radiological studies. Reviewed by me in PACS.            PROCEDURES  Procedures        OUTPATIENT MEDICATION MANAGEMENT:  No current Epic-ordered  facility-administered medications on file.     Current Outpatient Medications Ordered in Epic   Medication Sig Dispense Refill    acetaminophen (TYLENOL) 325 MG tablet Take 2 tablets by mouth Every 6 (Six) Hours As Needed for Mild Pain.      amLODIPine (NORVASC) 5 MG tablet Take 2 tablets by mouth Daily. 180 tablet 1    cefpodoxime (VANTIN) 200 MG tablet Take 1 tablet by mouth Daily With Breakfast & Lunch for 4 days. 8 tablet 0    Cholecalciferol (VITAMIN D3) 20 MCG (800 UNIT) tablet Take 800 Int'l Units by mouth Daily. 75 tablet     Coenzyme Q10 (COQ-10) 100 MG capsule Take 1 capsule by mouth Daily.      ferrous sulfate 325 (65 FE) MG tablet Take 1 tablet by mouth 4 (Four) Times a Week. Mondays Wednesdays Fridays Sundays      hydroCHLOROthiazide 12.5 MG tablet Take 1 tablet by mouth Daily. 90 tablet 1    Ibuprofen 3 %, Gabapentin 10 %, Baclofen 2 %, lidocaine 4 % Apply 1-2 g topically to the appropriate area as directed 3 (Three) to 4 (Four) times daily. 90 g 2    Multiple Vitamin (MULTI VITAMIN DAILY PO) Take 1 tablet by mouth Daily.      olmesartan (BENICAR) 40 MG tablet Take 1 tablet by mouth once daily 90 tablet 2    Omega-3 Fatty Acids (FISH OIL) 1000 MG capsule capsule Take 1 capsule by mouth Daily With Breakfast.      Pitavastatin Magnesium 1 MG tablet Take 1 mg by mouth Daily. 90 tablet 2    torsemide (DEMADEX) 5 MG tablet Take 1 tablet by mouth Daily. 90 tablet 2    vitamin B-12 (CYANOCOBALAMIN) 1000 MCG tablet Take 1 tablet by mouth Daily.             MEDICATIONS GIVEN IN ER  Medications - No data to display                MEDICAL DECISION MAKING, PROGRESS, and CONSULTS    All labs have been independently reviewed by me.  All radiology studies have been reviewed by me and I have also reviewed the radiology report.   EKG's independently viewed and interpreted by me.  Discussion below represents my analysis of pertinent findings related to patient's condition, differential diagnosis, treatment plan and  final disposition.      Additional sources:    - Discussed/ obtained information from independent historians: Spouse at bedside    - External (non-ED) record review: Patient saw Dr. Machuca, his nephrologist, yesterday for follow-up for hypertension and chronic kidney disease.  He had labs drawn on 3/18/2025.  Potassium was 5.2 then.    -Prescription drug monitoring program review:     N/A    - Chronic or social conditions impacting patient care (Social Determinants of HealthNone          Orders placed during this visit:  Orders Placed This Encounter   Procedures    [place] Draw    Comprehensive Metabolic Panel    CBC Auto Differential    [place] Draw    [place] Draw    Nephrology (on -call MD unless specified)    ECG 12 Lead Other; abnormal lab    CBC & Differential    Green Top (Gel)    Lavender Top    Gold Top - SST    Light Blue Top    Gray Top    Light Blue Top         Additional orders considered but not ordered:            Differential diagnosis includes, but is not limited to:    Lab error, electrolyte abnormality, worsening renal failure      Independent interpretation of labs, radiology studies, and discussions with consultants:  ED Course as of 03/22/25 1851   Sat Mar 22, 2025   1646 BP: 153/79 [WH]   1646 Temp: 96.9 °F (36.1 °C) [WH]   1646 Heart Rate: 90 [WH]   1646 Resp: 20 [WH]   1646 SpO2: 99 % [WH]   1646 Device (Oxygen Therapy): room air [WH]   1651 EKG personally interpreted by me.  My personal interpretation is:          EKG time: 1526  Rhythm/Rate: Sinus rhythm, rate 68  P waves and DE: Normal  QRS, axis: Borderline IVCD  ST and T waves: Inverted T waves in the lateral leads    Interpreted Contemporaneously by me, independently viewed  EKG is changed compared to prior EKG done on 3/16/2025.  T wave inversions were not present then   [WH]   1701 WBC(!): 14.77 [WH]   1701 Hemoglobin(!): 12.2 [WH]   1716 BUN(!): 37 [WH]   1716 Creatinine(!): 1.66 [WH]   1716 Potassium(!): 5.3  Creatinine 1.67 and  potassium 5.2 four days ago []   1741 Case discussed with Dr. Villanueva, nephrology.  Pertinent history, exam findings, test results, ED course, and diagnoses were discussed with him.  He recommends giving the patient information about a low potassium diet.  He will arrange to have the patient follow-up in the office. []      ED Course User Index  [] Nadeem Latham MD         COMPLEXITY OF CARE        DIAGNOSIS  Final diagnoses:   Hyperkalemia   Chronic renal impairment, unspecified CKD stage         DISPOSITION  DISCHARGE    Patient discharged in stable condition.    Reviewed implications of results, diagnosis, meds, responsibility to follow up, warning signs and symptoms of possible worsening, potential complications and reasons to return to ER    Patient/Family voiced understanding of above instructions.    Discussed plan for discharge, as there is no emergent indication for admission. Patient referred to primary care provider for BP management due to today's BP. Pt/family is agreeable and understands need for follow up and repeat testing.  Pt is aware that discharge does not mean that nothing is wrong but it indicates no emergency is present that requires admission and they must continue care with follow-up as given below or physician of their choice.     FOLLOW-UP  Po Machuca MD  [address]  [place] [address]  [phone]    Schedule an appointment as soon as possible for a visit            Medication List      No changes were made to your prescriptions during this visit.                   Latest Documented Vital Signs:  AS OF 18:51 EDT VITALS:    BP - 153/79  HR - 90  TEMP - 96.9 °F (36.1 °C)  O2 SATS - 99%            --    Please note that portions of this were completed with a voice recognition program.       Note Disclaimer: At [facility], we believe that sharing information builds trust and better relationships. You are receiving this note because you are  receiving care at [facility] or recently visited. It is possible you will see health information before a provider has talked with you about it. This kind of information can be easy to misunderstand. To help you fully understand what it means for your health, we urge you to discuss this note with your provider.             Nadeem Latham MD  03/22/25 7714

## 2025-03-25 ENCOUNTER — OFFICE VISIT (OUTPATIENT)
Dept: INTERNAL MEDICINE | Facility: CLINIC | Age: 80
End: 2025-03-25
Payer: MEDICARE

## 2025-03-25 VITALS
HEART RATE: 63 BPM | WEIGHT: 259.1 LBS | OXYGEN SATURATION: 98 % | DIASTOLIC BLOOD PRESSURE: 68 MMHG | BODY MASS INDEX: 35.1 KG/M2 | HEIGHT: 72 IN | SYSTOLIC BLOOD PRESSURE: 134 MMHG | TEMPERATURE: 97.5 F

## 2025-03-25 DIAGNOSIS — N18.32 CKD STAGE 3B, GFR 30-44 ML/MIN: ICD-10-CM

## 2025-03-25 DIAGNOSIS — I10 ESSENTIAL HYPERTENSION: ICD-10-CM

## 2025-03-25 DIAGNOSIS — J15.9 BACTERIAL PNEUMONIA: Primary | ICD-10-CM

## 2025-03-25 DIAGNOSIS — I87.2 VENOUS STASIS DERMATITIS: ICD-10-CM

## 2025-03-25 DIAGNOSIS — E87.5 HYPERKALEMIA: ICD-10-CM

## 2025-03-25 PROBLEM — R60.0 EDEMA OF LOWER EXTREMITY: Status: ACTIVE | Noted: 2025-03-20

## 2025-03-25 RX ORDER — COLCHICINE 0.6 MG/1
0.6 TABLET ORAL AS NEEDED
COMMUNITY
Start: 2025-03-21

## 2025-03-25 RX ORDER — DILTIAZEM HYDROCHLORIDE 180 MG/1
1 CAPSULE, COATED, EXTENDED RELEASE ORAL DAILY
COMMUNITY
Start: 2025-03-21

## 2025-03-25 RX ORDER — TORSEMIDE 20 MG/1
0.5 TABLET ORAL DAILY
COMMUNITY
Start: 2025-03-21

## 2025-03-25 NOTE — PROGRESS NOTES
Transitional Care Follow Up Visit  Subjective     Dileep Hamilton is a 79 y.o. male who presents for a transitional care management visit.    Within 48 business hours after discharge our office contacted him via telephone to coordinate his care and needs.      I reviewed and discussed the details of that call along with the discharge summary, hospital problems, inpatient lab results, inpatient diagnostic studies, and consultation reports with Dileep.     Current outpatient and discharge medications have been reconciled for the patient.  Reviewed by: Juan A Rubi MD          3/18/2025     7:00 PM   Date of TCM Phone Call   [facility]   Date of Admission 3/16/2025   Date of Discharge 3/18/2025   Discharge Disposition Home or Self Care     Risk for Readmission (LACE) Score: 10 (3/18/2025  6:00 AM)      History of Present Illness   Dileep Hamilton is a 79 y.o. male RTC In TCM visit after recent admission for PNA.  NOtes that 'i just got real sick and real weak'.  Started on a Friday and then progressed overweekend and sent in on Sunday due to swollen legs. SOA was never too bad but 'lot of coughing'.  Had fever 102 at home.    Completed Abx.   Feeling 'better'. 'I am still a little weak, getting my strength back'.  Cough is getting better, non-productive at that time.   No persistent fevers.  No SOA.  No confusion events.   Is getting up and moving more. Came home with walker. Is now 'up and about without the walker'.  Wearing compression socks (getting inmproved swelling at night, progressive through day).  Recalls, leg swelling 'off and on' over the years.         Course During Hospital Stay:    Dileep Hamilton is a 79 y.o. male  presents the hospital with sepsis due to multifocal bacterial pneumonia.      Pneumonia:  Patient found to have findings of sepsis from what is thought to be bacterial pneumonia.  His procalcitonin is elevated and he had initial white blood cell count of 22,000.   "Infiltrates were present on his chest x-ray bilaterally which I personally reviewed.  He was started on broad-spectrum antibiotics and his cough and sputum have drastically improved.  He will discharge on Vantin could not complete his antibiotic course.  He is not currently hypoxic and is hemodynamically stable.  Sepsis has resolved.     Hyperkalemia:  Patient has some elevation in his potassium level which has returned to the upper end of the normal range today.  He was placed on low potassium diet and was recommended to continue this at discharge.  I recommend a BMP at his primary care follow-up to reassess his potassium level.     Weakness: This was generalized weakness and has improved with treatment of his infection.  PT has cleared him to go home.     The remainder of his medical problems are reasonably stable.     At the time of discharge patient was told to take all medications as prescribed, keep all follow-up appointments, and call their doctor or return to the hospital with any worsening or concerning symptom     The following portions of the patient's history were reviewed and updated as appropriate: allergies, current medications, past medical history, past social history, and problem list.    Review of Systems   Constitutional:  Positive for fatigue. Negative for chills and fever.   HENT:  Negative for trouble swallowing.    Respiratory:  Negative for cough, chest tightness and shortness of breath.    Neurological:  Negative for dizziness, speech difficulty and light-headedness.   Psychiatric/Behavioral:  Negative for behavioral problems and confusion.        Objective   /68 (BP Location: Left arm, Patient Position: Sitting, Cuff Size: Adult)   Pulse 63   Temp 97.5 °F (36.4 °C) (Infrared)   Ht 182.9 cm (72.01\")   Wt 118 kg (259 lb 1.6 oz)   SpO2 98%   BMI 35.13 kg/m²   Physical Exam  Vitals reviewed.   Constitutional:       General: He is not in acute distress.     Appearance: He is " well-developed. He is obese. He is not ill-appearing or toxic-appearing.   HENT:      Head: Normocephalic and atraumatic.      Mouth/Throat:      Mouth: Mucous membranes are moist. No oral lesions.      Tongue: No lesions.      Pharynx: Oropharynx is clear. No pharyngeal swelling, oropharyngeal exudate, posterior oropharyngeal erythema or uvula swelling.   Eyes:      General: No scleral icterus.     Conjunctiva/sclera: Conjunctivae normal.      Pupils: Pupils are equal, round, and reactive to light.   Neck:      Vascular: No carotid bruit.   Cardiovascular:      Rate and Rhythm: Normal rate and regular rhythm.      Pulses:           Carotid pulses are 2+ on the right side and 2+ on the left side.       Radial pulses are 2+ on the right side and 2+ on the left side.      Heart sounds: Normal heart sounds.      Comments: Compression socks on B  Pulmonary:      Effort: Pulmonary effort is normal. No respiratory distress.      Breath sounds: Normal breath sounds. No wheezing, rhonchi or rales.   Musculoskeletal:      Cervical back: Normal range of motion and neck supple. No muscular tenderness.      Right lower le+ Edema present.      Left lower le+ Edema present.   Lymphadenopathy:      Cervical: No cervical adenopathy.   Neurological:      Mental Status: He is alert and oriented to person, place, and time.      Cranial Nerves: No cranial nerve deficit, dysarthria or facial asymmetry.      Gait: Gait normal.   Psychiatric:         Attention and Perception: Attention normal.         Mood and Affect: Mood and affect normal.         Behavior: Behavior normal.         Thought Content: Thought content normal.         Assessment & Plan   Diagnoses and all orders for this visit:    1. Bacterial pneumonia (Primary)    2. CKD stage 3b, GFR 30-44 ml/min    3. Essential hypertension    4. Hyperkalemia    5. Venous stasis dermatitis        Dileep Hamilton is a 79 y.o. male RTC In TCM visit after recent admission 3/16-3/18/25   for PNA with sepsis.  Pt has recovered well, remnant fatigue and mild cough noted.  Concurrently has seen nephrology for w/u of persistent hyperkalemia.  Returned to ED 3/22/25 for spurious K+ of 6.3, resolved to 5.2 on recheck in ED, no repeat admission.   S/P Abx course completion.   Recent ED labs reviewed showing K+ at 5.3, resolving leukocytosis, and stable renal function, and resolving acute in pt anemia.   Reassured pt overall, appers to be recovering well. Advised:  -Graded exercise program  - Repeat CXR 5/2025 visit in office to document PNA clearance.   - C/W current meds.   - C/W compression socks nightly.   - No indication to return to inpt care of step up in care at this time.   - Pt to call or RTC if T> 100.5, SOA, double sickness, or failure to improve.      RTC as planned 5/2025

## 2025-04-07 ENCOUNTER — OFFICE VISIT (OUTPATIENT)
Dept: SLEEP MEDICINE | Facility: HOSPITAL | Age: 80
DRG: 871 | End: 2025-04-07
Payer: MEDICARE

## 2025-04-07 VITALS
HEIGHT: 72 IN | HEART RATE: 75 BPM | WEIGHT: 258 LBS | BODY MASS INDEX: 34.95 KG/M2 | OXYGEN SATURATION: 96 % | DIASTOLIC BLOOD PRESSURE: 77 MMHG | SYSTOLIC BLOOD PRESSURE: 180 MMHG

## 2025-04-07 DIAGNOSIS — G47.33 OSA (OBSTRUCTIVE SLEEP APNEA): Primary | ICD-10-CM

## 2025-04-07 PROCEDURE — G0463 HOSPITAL OUTPT CLINIC VISIT: HCPCS

## 2025-04-07 NOTE — PROGRESS NOTES
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     He reported snoring which was louder when he was 30-40 lb heavier.  He continues to snore but he thinks that his snoring has gotten better compared to previously.    He denies apnea, choking, gasping for breath.  He denies RLS symptoms.  He feels like he sleeps well.    Sleep schedule:  -Bedtime: 11 PM  -Sleep latency: Few min  -Wake up time: 6 AM , does feel refreshed  -Nocturnal awakenin-2 times to change position.  No difficulties going back to sleep.  -Perceived sleep hours: 7      ESS: Total score: 3     He exercises by golfing and he also works 3 days a week.  He stated that he has lost about 30 pounds over the last 5 years which improved his snoring significantly.    He was evaluated by Dr. Rubi and was referred here for sleep apnea evaluation especially in the setting of comorbid DM and HTN.  He takes 4 BP medication.  His diabetes is controlled with diet.    Review of Systems  Constitutional: No fever or chills. No changes in appetite.   ENMT: No sinus congestion, postnasal drip, hoarsness  Cardiovascular: No chest pain, palpitation or legs swelling.    Respiratory: No dyspnea, cough or wheezing.  Gastrointestinal: No constipation, diarrhea, abdominal pain or acid reflux  Neurology: No headache, weakness, numbness or dizziness.   Musculoskeletal: No  joints pain, stiffness or swelling.   Psychiatry: No depression, anxiety or irritability.   Hem/Lymphatic: No swollen glands or easy bruising.  Integumentary: No rash.  Endocrinology: No excessive thirst, cold or warm intolerance.   Urinary: No dysuria, bloody urine or frequent urination.     History  Past Medical History:   Diagnosis Date    Arthritis Hips    Bacterial pneumonia 03/17/2025    Benign prostatic hyperplasia 2    Borderline diabetes     Bursitis of hip 09/2002    Cataract Right eye 2020    Chronic pain disorder 2021    Colon cancer     T1N0 stage I, s/p partial R colon resection 2014    Diabetes type 2, controlled 01/26/2016    Family history of blood clots     Heart murmur 2016    Hernia Abdomen    Hip arthrosis 09/2002    History of cataract     B early 2014    History of shingles     2013    Hyperlipidemia     Hypertension     dx'd 8/2014    IFG (impaired fasting glucose)     Leukocytosis     Lumbar radiculitis 12/27/2022    Lung mass 11/12/2015    chronic per pt, distant imaging     Obesity     Personal history of scoliosis     mild    Proteinuria     Rotator cuff syndrome     Scoliosis Lower back    Shingles outbreak 10/15/2017    Squamous cell carcinoma     skin    Stasis dermatitis     Trochanteric bursitis of left hip 03/24/2023    Formatting of this note might be different from the original.  Added automatically from request for surgery [id number]      Visual impairment Cataracts    Vitamin D deficiency    ,   Past Surgical History:   Procedure Laterality Date    BURSECTOMY Left 04/19/2023    CATARACT EXTRACTION Right 05/2020    CATARACT EXTRACTION Left 11/02/2022    COLON SURGERY  12/05/2013    COLONOSCOPY  2018    EPIDURAL Left 12/29/2022    Procedure: LUMBAR/SACRAL TRANSFORAMINAL EPIDURAL Left L4-5;  Surgeon: Tori Solitario MD;  [place] [facility] [place]  Service: Pain Management;  Laterality: Left;    EPIDURAL BLOCK  12/29/2022    SUBTOTAL COLECTOMY  12/05/2014    colon ca 2014; R side of  colon only    VASECTOMY      1978   ,   Family History   Problem Relation Age of Onset    Heart disease Mother     Diabetes Mother     Hypertension Mother     Lung cancer Mother     COPD Mother         `    Asthma Mother     Anesthesia problems Mother     Arthritis Mother     Arthritis Father     Stroke Father     Diabetes Father     Heart disease Father     Diabetes type II Sister     Diabetes type II Brother     No Known Problems Son     Diabetes Maternal Grandmother     Clotting disorder Other         Family history of blood clots   ,   Social History     Socioeconomic History    Marital status:      Spouse name: Tana    Number of children: 1   Tobacco Use    Smoking status: Never    Smokeless tobacco: Never   Vaping Use    Vaping status: Never Used   Substance and Sexual Activity    Alcohol use: No    Drug use: No    Sexual activity: Yes     Partners: Female     Birth control/protection: None, Vasectomy     Comment: wife only; no hx STD's     E-cigarette/Vaping    E-cigarette/Vaping Use Never User     Passive Exposure No     Counseling Given No      E-cigarette/Vaping Substances    Nicotine No     THC No     CBD No     Flavoring No      E-cigarette/Vaping Devices    Disposable No     Pre-filled or Refillable Cartridge No     Refillable Tank No     Pre-filled Pod No         and Allergies:  Statins    Medications:    Current Outpatient Medications:     acetaminophen (TYLENOL) 325 MG tablet, Take 2 tablets by mouth Every 6 (Six) Hours As Needed for Mild Pain., Disp: , Rfl:     amLODIPine (NORVASC) 5 MG tablet, Take 2 tablets by mouth Daily., Disp: 180 tablet, Rfl: 1    Cholecalciferol (VITAMIN D3) 20 MCG (800 UNIT) tablet, Take 800 Int'l Units by mouth Daily., Disp: 75 tablet, Rfl:     Coenzyme Q10 (COQ-10) 100 MG capsule, Take 1 capsule by mouth Daily., Disp: , Rfl:     colchicine 0.6 MG tablet, Take 1 tablet by mouth As Needed for Muscle / Joint Pain (Gout flare ups)., Disp: , Rfl:     dilTIAZem CD  "(CARDIZEM CD) 180 MG 24 hr capsule, Take 1 capsule by mouth Daily., Disp: , Rfl:     ferrous sulfate 325 (65 FE) MG tablet, Take 1 tablet by mouth 4 (Four) Times a Week. Mondays Wednesdays Fridays Sundays, Disp: , Rfl:     hydroCHLOROthiazide 12.5 MG tablet, Take 1 tablet by mouth Daily., Disp: 90 tablet, Rfl: 1    Multiple Vitamin (MULTI VITAMIN DAILY PO), Take 1 tablet by mouth Daily., Disp: , Rfl:     olmesartan (BENICAR) 40 MG tablet, Take 1 tablet by mouth once daily, Disp: 90 tablet, Rfl: 2    Omega-3 Fatty Acids (FISH OIL) 1000 MG capsule capsule, Take 1 capsule by mouth Daily With Breakfast., Disp: , Rfl:     Pitavastatin Magnesium 1 MG tablet, Take 1 mg by mouth Daily., Disp: 90 tablet, Rfl: 2    torsemide (DEMADEX) 20 MG tablet, Take 0.5 tablets by mouth Daily., Disp: , Rfl:     vitamin B-12 (CYANOCOBALAMIN) 1000 MCG tablet, Take 1 tablet by mouth Daily., Disp: , Rfl:       Objective   Vital Signs:  Vitals:    04/07/25 1515   BP: 180/77   Pulse: 75   SpO2: 96%   Weight: 117 kg (258 lb)   Height: 182.9 cm (72.01\")     Body mass index is 34.98 kg/m².        Physical Exam:        Constitutional: Not in acute distress.  Eyes: Injected conjunctiva, EOMI. pupils equal reactive to light.  ENMT: Griffith score 3. Mallampati score 2. No oral thrush. Tonsils grade 1.  Elongated uvula.. Narrow distance in between the posterior pharyngeal pillars.  Slightly scalloped tongue .  Neck: Large. No thyromegaly.  Trachea midline.  Heart: Regular rhythm and rate.  Soft diastolic murmur over the left sternal border.  Lungs: Good and equal air entry bilaterally. No crackles or wheezing.  Nonlabored breathing.       Abdomen: Obese.  Soft.  No tenderness.  Positive bowel sounds.  Extremities: No cyanosis, clubbing or pitting edema.  Warm extremities and well-perfused.  Neuro: Conscious, alert, oriented x3.  Gait is normal.  Strength 5/5 in arms and legs.  Psych: Appropriate mood and affect.    Integumentary: No rash.  Normal skin " turgor.  Lymphatic: No palpable cervical or supraclavicular lymph nodes.    Diagnostic data:  Lab Results   Component Value Date    HGBA1C 6.00 (H) 01/03/2025     Total Cholesterol   Date Value Ref Range Status   04/28/2023 176 0 - 200 mg/dL Final   04/28/2023 180 0 - 200 mg/dL Final     Triglycerides   Date Value Ref Range Status   05/10/2024 86 0 - 149 mg/dL Final   04/28/2023 109 0 - 150 mg/dL Final   04/28/2023 112 0 - 150 mg/dL Final     HDL Cholesterol   Date Value Ref Range Status   05/10/2024 46 >39 mg/dL Final   04/28/2023 35 (L) 40 - 60 mg/dL Final   04/28/2023 37 (L) 40 - 60 mg/dL Final     Hemoglobin   Date Value Ref Range Status   03/22/2025 12.2 (L) 13.0 - 17.7 g/dL Final   12/03/2019 13.4 (L) 13.5 - 17.5 g/dL Final     CO2   Date Value Ref Range Status   03/22/2025 26.8 22.0 - 29.0 mmol/L Final     Total CO2   Date Value Ref Range Status   12/03/2019 30 22 - 30 mmol/L Final        Assessment   Snoring  Obesity, BMI 34    Comorbid conditions:  Resistant HTN  DM type II      Plan:  Check HST. We discussed the pathophysiology of sleep apnea, testing and therapy which include CPAP and weight loss.  Patient is agreeable with CPAP therapy if needed.     Counseled for weight loss.  Encouraged to exercise regularly and cut down on carbohydrates.  Discussed that losing weight may decrease the severity of sleep apnea and obviate the need of CPAP therapy.    Continue amlodipine, diltiazem, HCTZ, torsemide and olmesartan.  Discussed the association between obstructive sleep apnea and cardiovascular disease including HTN and the beneficial effect of Pap therapy in reducing the risk of major cardiovascular events.    RTC after testing.     Time: 32 min      Naye Alexis MD  04/07/25  15:39 EDT    This note was dictated utilizing Dragon dictation

## 2025-04-08 ENCOUNTER — READMISSION MANAGEMENT (OUTPATIENT)
Dept: CALL CENTER | Facility: HOSPITAL | Age: 80
End: 2025-04-08
Payer: MEDICARE

## 2025-04-08 NOTE — OUTREACH NOTE
Sepsis Week 3 Survey      Flowsheet Row Responses   [facility] facility patient discharged from? [place]   Does the patient have one of the following disease processes/diagnoses(primary or secondary)? Sepsis   Week 3 attempt successful? No   Unsuccessful attempts Attempt 1            MORRIS GALLOWAY - Registered Nurse

## 2025-04-18 ENCOUNTER — HOSPITAL ENCOUNTER (OUTPATIENT)
Dept: SLEEP MEDICINE | Facility: HOSPITAL | Age: 80
Discharge: HOME | End: 2025-04-18
Admitting: INTERNAL MEDICINE
Payer: MEDICARE

## 2025-04-18 DIAGNOSIS — G47.33 OSA (OBSTRUCTIVE SLEEP APNEA): ICD-10-CM

## 2025-04-18 PROCEDURE — G0399 HOME SLEEP TEST/TYPE 3 PORTA: HCPCS

## 2025-05-02 NOTE — PROGRESS NOTES
Subjective   Dileep Hamilton is a 77 y.o. male     Chief Complaint   Patient presents with   • Medicare Wellness-subsequent     Review of Medical Issues       HPI:  Dileep Hamilton is a 77 y.o. male RTC In yearly AWV, review of medical issues:   1. Severe L lateral hip pain - issues > 1 year now. Did see sport med and had injx in bursa, some help.  Had MRI  and had injx in L hip joint and cortsione in bursa again. Not much help.  Had PRP injx, 'did not help'.  Had MRI lumbar that showed some mild narrowing at L5-S1. Went to pain mgmnt and had dx'ic injx at L L5-S1 transforaminal epidural.  Has call in to ortho.  Pt notes only thing that helped was shot in bursa. Injx in joint did 'not really work'.   2. HTN - was borderline controlled on one drug per last note.  Has not been checking at home. 'I forgot about it'. No weight loss. Has not been able to exercise at all in last year with hip pain.  Not using any meds other than Tylenol. \"I limit my activity dell my hip does not flare up'.   3. HLD - intolerant to Pravastatin with CoQ10 and Livalo was expensive, now declines all statin meds.  4. DMII with long hx obesity - \"I ate a little more over the holidays than I should have'.  No exercsie.  Weight is stable. Optho appt UTD 11/2/22.   5. Suspected AIDEE - giving different hx today noting wife says he does not snore. However, pt admits marked personal objection to CPAP mask having tried it on in school in past. I d/w pt that we cannot operate in ignorance of the situation here and need to at least test, then discuss any needed tx or concerns. PT hesitantly agrees to sleep study referral.   6. Colon CA, cecum - Stage I, s/p R colon resection in 12/5/2014.  C-scope clear 3/2018 with Dr. Sin --> repeat in 5 years, does not have appt yet.  Planning to see same office for scope.  7. Aortic calcification without stenosis - noted on ECHO 2016 with audible RUSB murmur on exam.  No OSORIO or stamina issues. No C/P issues.   8.  Stasis dermatitis, mild - using compression socks 'off and on'.   9. SCC of skin - s/p derm eval 12/2021.  No new lesions of note.   10. Suspected AIDEE - saw sleep med. \"He thought it was not an issue'. No sleep study done.  Pt admits that he did refuse CPAP option while in office. \"I am just not interested\". Admits during discussion that 'probably would' consider CPAP if he did indeed have AIDEE.  Is not ready to go back for testing yet. Hip issue is really dominating picture for him.   11. HM - had Flu/ COVID booster/ Shingrix since last visit.     The following portions of the patient's history were reviewed and updated as appropriate: allergies, current medications, past family history, past medical history, past social history, past surgical history and problem list.    Review of Systems   Constitutional: Negative for chills, fever and malaise/fatigue.   HENT: Positive for tinnitus (mild). Negative for congestion, hearing loss, odynophagia and sore throat.    Eyes: Negative for discharge, double vision, pain and redness.   Cardiovascular: Negative for chest pain, dyspnea on exertion, irregular heartbeat, near-syncope, palpitations and syncope.   Respiratory: Negative for cough and shortness of breath.    Endocrine: Negative for polydipsia, polyphagia and polyuria.   Hematologic/Lymphatic: Negative for bleeding problem. Does not bruise/bleed easily (bruise on arms/ hands).   Skin: Negative for rash and suspicious lesions.   Musculoskeletal: Positive for arthritis (L hip) and joint pain. Negative for joint swelling, muscle cramps, muscle weakness and myalgias.   Gastrointestinal: Negative for constipation, diarrhea, dysphagia, heartburn, nausea and vomiting.   Genitourinary: Negative for dysuria, frequency, hematuria and incomplete emptying.   Neurological: Negative for dizziness, headaches and light-headedness.   Psychiatric/Behavioral: Negative for depression. The patient is not nervous/anxious.     Allergic/Immunologic: Negative for environmental allergies and persistent infections.       Patient Care Team:  Juan A Rubi MD as PCP - General  Juan A Rubi MD as PCP - Family Medicine  Cyrs Colón MD as Consulting Physician (Ophthalmology)    Recent Hospitalizations: No recent hospitalization(s).    Depression Screen:   PHQ-2/PHQ-9 Depression Screening 1/6/2023   Retired Total Score -   Little Interest or Pleasure in Doing Things 0-->not at all   Feeling Down, Depressed or Hopeless 0-->not at all   PHQ-9: Brief Depression Severity Measure Score 0       Functional and Cognitive Screening:  Functional & Cognitive Status 1/6/2023   Do you have difficulty preparing food and eating? No   Do you have difficulty bathing yourself, getting dressed or grooming yourself? No   Do you have difficulty using the toilet? No   Do you have difficulty moving around from place to place? No   Do you have trouble with steps or getting out of a bed or a chair? No   Current Diet Well Balanced Diet   Dental Exam Up to date   Eye Exam Up to date   Exercise (times per week) 0 times per week   Current Exercises Include No Regular Exercise        Exercise Comment currently has hip problems   Current Exercise Activities Include -   Do you need help using the phone?  No   Are you deaf or do you have serious difficulty hearing?  No   Do you need help with transportation? No   Do you need help shopping? No   Do you need help preparing meals?  No   Do you need help with housework?  No   Do you need help with laundry? No   Do you need help taking your medications? No   Do you need help managing money? No   Do you ever drive or ride in a car without wearing a seat belt? No   Have you felt unusual stress, anger or loneliness in the last month? No   Who do you live with? Spouse   If you need help, do you have trouble finding someone available to you? No   Have you been bothered in the last four weeks by sexual problems? No   Do you  have difficulty concentrating, remembering or making decisions? No       Does the patient have evidence of cognitive impairment? no    Does not need ASA (and currently is not on it)    Vitals:    01/06/23 1255 01/06/23 1314   BP: 158/78 156/78   BP Location: Left arm Left arm   Patient Position: Sitting Sitting   Cuff Size: Adult Adult   Pulse: 60    Temp: 98.3 °F (36.8 °C)    TempSrc: Oral    SpO2: 99%    Weight: 127 kg (279 lb 12.8 oz)    Height: 182.9 cm (72\")    PainSc:   7    PainLoc: Hip        Body mass index is 37.95 kg/m².    Visual Acuity:  No results found.    Advanced Care Planning:  ACP discussion was held with the patient during this visit. Patient has an advance directive (not in EMR), copy requested.    Objective   Physical Exam  Vitals reviewed.   Constitutional:       General: He is not in acute distress.     Appearance: Normal appearance. He is well-developed. He is obese. He is not ill-appearing or toxic-appearing.   HENT:      Head: Normocephalic and atraumatic.      Right Ear: Hearing, tympanic membrane, ear canal and external ear normal. There is no impacted cerumen.      Left Ear: Hearing, tympanic membrane, ear canal and external ear normal. There is no impacted cerumen.      Nose: Nose normal.      Mouth/Throat:      Mouth: Mucous membranes are moist. No oral lesions.      Tongue: No lesions.      Pharynx: Oropharynx is clear. Uvula midline. No pharyngeal swelling, oropharyngeal exudate, posterior oropharyngeal erythema or uvula swelling.      Comments: Class III-IV Mallampati    Eyes:      General: Lids are normal. No scleral icterus.        Right eye: No discharge.         Left eye: No discharge.      Extraocular Movements: Extraocular movements intact.      Conjunctiva/sclera: Conjunctivae normal.      Pupils: Pupils are equal, round, and reactive to light.   Neck:      Thyroid: No thyroid mass or thyromegaly.      Vascular: No carotid bruit.   Cardiovascular:      Rate and Rhythm:  Normal rate and regular rhythm.      Pulses:           Carotid pulses are 2+ on the right side and 2+ on the left side.       Radial pulses are 2+ on the right side and 2+ on the left side.        Dorsalis pedis pulses are 2+ on the right side and 2+ on the left side.      Heart sounds: S1 normal and S2 normal. Murmur heard.    Systolic murmur is present with a grade of 3/6.    No friction rub. No gallop.      Comments: Spider veins noted BLE    Pulmonary:      Effort: Pulmonary effort is normal. No respiratory distress.      Breath sounds: Normal breath sounds. No wheezing, rhonchi or rales.   Abdominal:      General: Bowel sounds are normal. There is no distension.      Palpations: Abdomen is soft. There is no mass.      Tenderness: There is no abdominal tenderness. There is no guarding or rebound.   Genitourinary:     Prostate: Enlarged (diffuse, symmetric). Not tender and no nodules present.      Rectum: External hemorrhoid present. No tenderness. Normal anal tone.   Musculoskeletal:         General: No deformity. Normal range of motion.      Cervical back: Full passive range of motion without pain, normal range of motion and neck supple.      Right hip: Normal range of motion.      Left hip: Bony tenderness (over trochanteric bursa) present. No crepitus. Normal range of motion.      Right lower le+ Pitting Edema present.      Left lower le+ Pitting Edema present.      Right foot: Normal range of motion.      Left foot: Normal range of motion.   Lymphadenopathy:      Cervical: No cervical adenopathy.      Right cervical: No superficial, deep or posterior cervical adenopathy.     Left cervical: No superficial, deep or posterior cervical adenopathy.      Upper Body:      Right upper body: No supraclavicular, axillary or pectoral adenopathy.      Left upper body: No supraclavicular, axillary or pectoral adenopathy.   Skin:     General: Skin is warm and dry.      Findings: No rash.          Neurological:       Mental Status: He is alert and oriented to person, place, and time.      Cranial Nerves: No cranial nerve deficit.      Sensory: No sensory deficit.      Motor: No weakness, tremor, atrophy or abnormal muscle tone.      Gait: Gait normal.      Deep Tendon Reflexes: Reflexes are normal and symmetric.      Reflex Scores:       Patellar reflexes are 2+ on the right side and 2+ on the left side.       Achilles reflexes are 2+ on the right side and 2+ on the left side.  Psychiatric:         Attention and Perception: Attention normal.         Mood and Affect: Mood normal.         Speech: Speech normal.         Behavior: Behavior normal. Behavior is cooperative.         Thought Content: Thought content normal.         Compared to one year ago, the patient feels physical health is worse.  Compared to one year ago, the patient feels mental health is the same.    Reviewed chart for potential of high risk medication in the elderly: yes  Reviewed chart for potential of harmful drug interactions in the elderly:yes    Identification of Risk Factors:  Risk factors include: Advance Directive Discussion  Cardiovascular risk  Chronic Pain   Colon Cancer Screening  Diabetic Lab Screening   Hearing Problem  Immunizations Discussed/Encouraged (specific immunizations; Tdap, Hepatitis A Vaccine/Series, Influenza, Pneumococcal 23, Shingrix and COVID19 )  Obesity/Overweight   Prostate Cancer Screening .    Patient Self-Management and Personalized Health Advice  The patient has been provided with information about: diet, exercise and weight management and preventive services including:   · Annual Wellness Visit (AWV)  · Colorectal Cancer Screening, Colonoscopy  · Prostate Cancer Screening .    Discussed the patient's BMI with him. The BMI is above average; BMI management plan is completed.    Assessment & Plan   Diagnoses and all orders for this visit:    1. Encounter for subsequent annual wellness visit (AWV) in Medicare patient  (Primary)    2. Encounter for general adult medical examination with abnormal findings    3. Venous stasis dermatitis of both lower extremities    4. Squamous cell carcinoma of skin    5. Primary osteoarthritis of both hips    6. Malignant neoplasm of colon, unspecified part of colon (HCC)    7. Morbid obesity due to excess calories (HCC)    8. Lumbar foraminal stenosis    9. Mixed hyperlipidemia    10. Essential hypertension  -     olmesartan (BENICAR) 40 MG tablet; Take 1 tablet by mouth Daily.  Dispense: 90 tablet; Refill: 1  -     amLODIPine (NORVASC) 5 MG tablet; Take 1 tablet by mouth Daily.  Dispense: 90 tablet; Refill: 1    11. Controlled type 2 diabetes mellitus without complication, without long-term current use of insulin (HCC)    12. Vitamin D deficiency    13. Benign prostatic hyperplasia without lower urinary tract symptoms    14. Aortic valve calcification    15. Uncontrolled hypertension  -     olmesartan (BENICAR) 40 MG tablet; Take 1 tablet by mouth Daily.  Dispense: 90 tablet; Refill: 1  -     amLODIPine (NORVASC) 5 MG tablet; Take 1 tablet by mouth Daily.  Dispense: 90 tablet; Refill: 1    16. Lymphocytosis            Diagnoses and all orders for this visit:    Encounter for subsequent annual wellness visit (AWV) in Medicare patient    Encounter for general adult medical examination with abnormal findings    Venous stasis dermatitis of both lower extremities    Squamous cell carcinoma of skin    Primary osteoarthritis of both hips    Malignant neoplasm of colon, unspecified part of colon (HCC)    Morbid obesity due to excess calories (HCC)    Lumbar foraminal stenosis    Mixed hyperlipidemia    Essential hypertension  -     olmesartan (BENICAR) 40 MG tablet; Take 1 tablet by mouth Daily.  -     amLODIPine (NORVASC) 5 MG tablet; Take 1 tablet by mouth Daily.    Controlled type 2 diabetes mellitus without complication, without long-term current use of insulin (HCC)    Vitamin D deficiency    Benign  prostatic hyperplasia without lower urinary tract symptoms    Aortic valve calcification    Uncontrolled hypertension  -     olmesartan (BENICAR) 40 MG tablet; Take 1 tablet by mouth Daily.  -     amLODIPine (NORVASC) 5 MG tablet; Take 1 tablet by mouth Daily.    Lymphocytosis    Other orders  -     Discontinue: ketorolac (ACULAR) 0.4 % solution; INSTILL 1 DROP INTO LEFT EYE 4 TIMES DAILY BEGINNING THE DAY BEFORE SURGERY  -     Discontinue: prednisoLONE acetate (PRED FORTE) 1 % ophthalmic suspension; INSTILL 1 DROP INTO LEFT EYE 4 TIMES DAILY BEGINNING 1 DAY PRIOR TO SURGERY        Dileep Hamilton is a 77 y.o. male RTC In yearly AWV, review of medical issues: Very active visit, multiple active issues.   1. Severe L lateral hip pain, Mild arthritic changes of both hips without hip fracture and Chronic bilateral hamstring origin tendinopathy.- issues > 1 year now. S/P eval with sports med, ortho and pain mgmnt. S/P:   Left L5-S1 Transforaminal epidural steroid injection (termed L4-5 on MRI with a sacralized L5, however L5-S1 on x-ray counting from 12th rib)  Left greater trochanteric bursa injection-4/5/22, 2/3/22, 11/28/21, 11/10/21 Dr. Powell temporary relief  Left intra-articular hip injection 6/17/22 -temporary relief  PRP injection in the left hip 8/11/22  Exam remains with focal TTP over L trochanteric bursa and gives hx of severe pain focally at site with walking, markedly limited in walking at this time.  Not c/w hamstring tendinopathy. At this time, this is QOL issue and frustrating for pt. Awaiting call back from ortho; consider second opinion with Dr. Iyer if unable to determine tx course.  2. HTN - was borderline controlled on one drug per last note, not seen in over a year here.  Out of control B/P, confounded by pain today. Change losartan to high dose longer acting olmesartan and add amlodipine 5mg daily.  Recheck pressure in 4 weeks. TLC diet mods and weight loss needed. Restart home log with cuff and  log to f/u visit.   3. HLD - intolerant to Pravastatin with CoQ10 and Livalo was expensive, now declines all statin meds. LDL progressive, but hold on med addition for now. Readdress at f/u.   4. DMII with long hx obesity -   A1C progressive with decline in diet and no exercise with noted weight gain.  Optho UTD.   Weight loss advised. Check Umicroalb/Cr next labs.   5. Suspected AIDEE - saw sleep med in 2022, sleep study deferred as pt decliend CPAP use if positive and declined snoring hx. Pt remains high risk anatomy and agrees, afer much discussion, would consider CPAP if positive. Defers for now given issues in #1.   6. Colon CA, cecum - Stage I, s/p R colon resection in 12/5/2014.  C-scope clear 3/2018 with Dr. Sin --> repeat in 5 years,due. Pt agrees to make appt.   7. Aortic calcification without stenosis - noted on ECHO 2016 with audible RUSB murmur on exam.    8. Stasis dermatitis, mild - compression socks daily advised.   9. SCC of skin - s/p derm eval 12/2021.Appt upcoming.   10. Vitamin D deficiency - progressive on daily 1000 I.U. Vitamin D3., changed to 800 I.U. Daily next bottle. Trend next labs.  11. Lymphocytosis - noted on CBC, progressive. Recheck upcoming labs with manual smear.   12. HM - labs d/w pt; Flu/ Td/ Pvax/ Prevnar/ Shingrix/ COVID - UTD; C-scope 3/2018 (-) --> 5 years, pt to schedule;  ONEL/ PSA OK; Hep C Ab (-) 2014; c/w exercise; Preventative care plan provided to pt at end of visit       Return in about 4 weeks (around 2/3/2023) for Recheck. (BMP, Umicroalb/Cr, Vit D, CBC WITH MANUAL SMEAR prior)          Current Outpatient Medications:   •  Cholecalciferol (VITAMIN D3) 20 MCG (800 UNIT) tablet, Take 800 Int'l Units by mouth Daily., Disp: 75 tablet, Rfl:   •  Coenzyme Q10 (COQ-10) 100 MG capsule, Take  by mouth., Disp: , Rfl:   •  Misc Natural Products (Osteo Bi-Flex Adv Triple St) tablet, , Disp: , Rfl:   •  Multiple Vitamin (MULTI VITAMIN DAILY PO), Take  by mouth., Disp: , Rfl:    •  Omega-3 Fatty Acids (FISH OIL) 1000 MG capsule capsule, Take  by mouth., Disp: , Rfl:   •  amLODIPine (NORVASC) 5 MG tablet, Take 1 tablet by mouth Daily., Disp: 90 tablet, Rfl: 1  •  olmesartan (BENICAR) 40 MG tablet, Take 1 tablet by mouth Daily., Disp: 90 tablet, Rfl: 1   cranial nerves II-XII intact details…

## 2025-05-05 ENCOUNTER — TELEPHONE (OUTPATIENT)
Dept: SLEEP MEDICINE | Facility: HOSPITAL | Age: 80
End: 2025-05-05
Payer: MEDICARE

## 2025-05-05 DIAGNOSIS — G47.33 OSA (OBSTRUCTIVE SLEEP APNEA): Primary | ICD-10-CM

## 2025-05-13 ENCOUNTER — LAB (OUTPATIENT)
Dept: LAB | Facility: HOSPITAL | Age: 80
End: 2025-05-13
Payer: MEDICARE

## 2025-05-13 ENCOUNTER — OFFICE VISIT (OUTPATIENT)
Dept: ONCOLOGY | Facility: CLINIC | Age: 80
End: 2025-05-13
Payer: MEDICARE

## 2025-05-13 VITALS
OXYGEN SATURATION: 97 % | TEMPERATURE: 98.6 F | WEIGHT: 259.9 LBS | BODY MASS INDEX: 35.2 KG/M2 | HEART RATE: 76 BPM | DIASTOLIC BLOOD PRESSURE: 80 MMHG | SYSTOLIC BLOOD PRESSURE: 140 MMHG | HEIGHT: 72 IN | RESPIRATION RATE: 17 BRPM

## 2025-05-13 DIAGNOSIS — C91.Z0 LARGE GRANULAR LYMPHOCYTE DISORDER: Primary | ICD-10-CM

## 2025-05-13 DIAGNOSIS — D51.3 OTHER DIETARY VITAMIN B12 DEFICIENCY ANEMIA: ICD-10-CM

## 2025-05-13 DIAGNOSIS — C91.Z0 LARGE GRANULAR LYMPHOCYTE DISORDER: ICD-10-CM

## 2025-05-13 DIAGNOSIS — D50.9 IRON DEFICIENCY ANEMIA, UNSPECIFIED IRON DEFICIENCY ANEMIA TYPE: ICD-10-CM

## 2025-05-13 LAB
BASOPHILS # BLD AUTO: 0.05 10*3/MM3 (ref 0–0.2)
BASOPHILS NFR BLD AUTO: 0.4 % (ref 0–1.5)
DEPRECATED RDW RBC AUTO: 46 FL (ref 37–54)
EOSINOPHIL # BLD AUTO: 0.22 10*3/MM3 (ref 0–0.4)
EOSINOPHIL NFR BLD AUTO: 1.9 % (ref 0.3–6.2)
ERYTHROCYTE [DISTWIDTH] IN BLOOD BY AUTOMATED COUNT: 13.1 % (ref 12.3–15.4)
FERRITIN SERPL-MCNC: 290 NG/ML (ref 30–400)
FOLATE SERPL-MCNC: 18 NG/ML (ref 4.78–24.2)
HCT VFR BLD AUTO: 36.4 % (ref 37.5–51)
HGB BLD-MCNC: 11.9 G/DL (ref 13–17.7)
HGB RETIC QN AUTO: 36.9 PG (ref 29.8–36.1)
IMM GRANULOCYTES # BLD AUTO: 0.04 10*3/MM3 (ref 0–0.05)
IMM GRANULOCYTES NFR BLD AUTO: 0.3 % (ref 0–0.5)
IMM RETICS NFR: 6.4 % (ref 3–15.8)
IRON 24H UR-MRATE: 70 MCG/DL (ref 59–158)
IRON SATN MFR SERPL: 22 % (ref 20–50)
LYMPHOCYTES # BLD AUTO: 4.69 10*3/MM3 (ref 0.7–3.1)
LYMPHOCYTES NFR BLD AUTO: 39.9 % (ref 19.6–45.3)
MCH RBC QN AUTO: 31.2 PG (ref 26.6–33)
MCHC RBC AUTO-ENTMCNC: 32.7 G/DL (ref 31.5–35.7)
MCV RBC AUTO: 95.5 FL (ref 79–97)
MONOCYTES # BLD AUTO: 0.74 10*3/MM3 (ref 0.1–0.9)
MONOCYTES NFR BLD AUTO: 6.3 % (ref 5–12)
NEUTROPHILS NFR BLD AUTO: 51.2 % (ref 42.7–76)
NEUTROPHILS NFR BLD AUTO: 6.01 10*3/MM3 (ref 1.7–7)
NRBC BLD AUTO-RTO: 0 /100 WBC (ref 0–0.2)
PLATELET # BLD AUTO: 320 10*3/MM3 (ref 140–450)
PMV BLD AUTO: 9.1 FL (ref 6–12)
RBC # BLD AUTO: 3.81 10*6/MM3 (ref 4.14–5.8)
RETICS # AUTO: 0.04 10*6/MM3 (ref 0.02–0.13)
RETICS/RBC NFR AUTO: 0.98 % (ref 0.7–1.9)
TIBC SERPL-MCNC: 325 MCG/DL (ref 298–536)
TRANSFERRIN SERPL-MCNC: 218 MG/DL (ref 200–360)
VIT B12 BLD-MCNC: >2000 PG/ML (ref 211–946)
WBC NRBC COR # BLD AUTO: 11.75 10*3/MM3 (ref 3.4–10.8)

## 2025-05-13 PROCEDURE — 1126F AMNT PAIN NOTED NONE PRSNT: CPT | Performed by: INTERNAL MEDICINE

## 2025-05-13 PROCEDURE — 3079F DIAST BP 80-89 MM HG: CPT | Performed by: INTERNAL MEDICINE

## 2025-05-13 PROCEDURE — 83540 ASSAY OF IRON: CPT

## 2025-05-13 PROCEDURE — 36415 COLL VENOUS BLD VENIPUNCTURE: CPT

## 2025-05-13 PROCEDURE — 84466 ASSAY OF TRANSFERRIN: CPT

## 2025-05-13 PROCEDURE — 82746 ASSAY OF FOLIC ACID SERUM: CPT | Performed by: INTERNAL MEDICINE

## 2025-05-13 PROCEDURE — 1160F RVW MEDS BY RX/DR IN RCRD: CPT | Performed by: INTERNAL MEDICINE

## 2025-05-13 PROCEDURE — 85046 RETICYTE/HGB CONCENTRATE: CPT

## 2025-05-13 PROCEDURE — 3077F SYST BP >= 140 MM HG: CPT | Performed by: INTERNAL MEDICINE

## 2025-05-13 PROCEDURE — 1159F MED LIST DOCD IN RCRD: CPT | Performed by: INTERNAL MEDICINE

## 2025-05-13 PROCEDURE — 82607 VITAMIN B-12: CPT | Performed by: INTERNAL MEDICINE

## 2025-05-13 PROCEDURE — 99214 OFFICE O/P EST MOD 30 MIN: CPT | Performed by: INTERNAL MEDICINE

## 2025-05-13 PROCEDURE — G2211 COMPLEX E/M VISIT ADD ON: HCPCS | Performed by: INTERNAL MEDICINE

## 2025-05-13 PROCEDURE — 82728 ASSAY OF FERRITIN: CPT

## 2025-05-13 PROCEDURE — 85025 COMPLETE CBC W/AUTO DIFF WBC: CPT

## 2025-05-13 NOTE — PROGRESS NOTES
Subjective     CHIEF COMPLAINT:      Chief Complaint   Patient presents with    Follow-up     HISTORY OF PRESENT ILLNESS:     Dileep Hamilton is a 79 y.o. male patient who returns today for follow up on his lymphocytosis and anemia.  He was hospitalized at [facility] between 3/16/2025 and 3/18/2025 and was diagnosed with bacterial pneumonia.  WBC count increased to 22,000.  He had worsening of the anemia and hemoglobin decreased to 10.6 on 10/17/2025.  After discharge, he had labs that revealed an increase in the potassium to 5.4.  He is back on taking the oral iron 4 days a week and the vitamin B12 daily.    ROS:  Pertinent ROS is in the HPI.     Past medical, surgical, social and family history were reviewed.     MEDICATIONS:    Current Outpatient Medications:     acetaminophen (TYLENOL) 325 MG tablet, Take 2 tablets by mouth Every 6 (Six) Hours As Needed for Mild Pain., Disp: , Rfl:     amLODIPine (NORVASC) 5 MG tablet, Take 2 tablets by mouth Daily., Disp: 180 tablet, Rfl: 1    Cholecalciferol (VITAMIN D3) 20 MCG (800 UNIT) tablet, Take 800 Int'l Units by mouth Daily., Disp: 75 tablet, Rfl:     Coenzyme Q10 (COQ-10) 100 MG capsule, Take 1 capsule by mouth Daily., Disp: , Rfl:     colchicine 0.6 MG tablet, Take 1 tablet by mouth As Needed for Muscle / Joint Pain (Gout flare ups)., Disp: , Rfl:     dilTIAZem CD (CARDIZEM CD) 180 MG 24 hr capsule, Take 1 capsule by mouth Daily., Disp: , Rfl:     ferrous sulfate 325 (65 FE) MG tablet, Take 1 tablet by mouth 4 (Four) Times a Week. Mondays Wednesdays Fridays Sundays, Disp: , Rfl:     Multiple Vitamin (MULTI VITAMIN DAILY PO), Take 1 tablet by mouth Daily., Disp: , Rfl:     olmesartan (BENICAR) 40 MG tablet, Take 1 tablet by mouth once daily, Disp: 90 tablet, Rfl: 2    Omega-3 Fatty Acids (FISH OIL) 1000 MG capsule capsule, Take 1 capsule by mouth Daily With Breakfast., Disp: , Rfl:     Pitavastatin Magnesium 1 MG tablet, Take 1 mg by mouth Daily., Disp:  "90 tablet, Rfl: 2    torsemide (DEMADEX) 20 MG tablet, Take 0.5 tablets by mouth Daily., Disp: , Rfl:     vitamin B-12 (CYANOCOBALAMIN) 1000 MCG tablet, Take 1 tablet by mouth Daily., Disp: , Rfl:     hydroCHLOROthiazide 12.5 MG tablet, Take 1 tablet by mouth Daily. (Patient not taking: Reported on 5/13/2025), Disp: 90 tablet, Rfl: 1  Objective     VITAL SIGNS:     Vitals:    05/13/25 1557   BP: 140/80   Pulse: 76   Resp: 17   Temp: 98.6 °F (37 °C)   TempSrc: Oral   SpO2: 97%   Weight: 118 kg (259 lb 14.4 oz)   Height: 182 cm (71.65\")   PainSc: 0-No pain     Body mass index is 35.59 kg/m².     Wt Readings from Last 5 Encounters:   05/13/25 118 kg (259 lb 14.4 oz)   04/07/25 117 kg (258 lb)   03/25/25 118 kg (259 lb 1.6 oz)   03/22/25 118 kg (260 lb)   03/17/25 120 kg (264 lb)     PHYSICAL EXAMINATION:   GENERAL: The patient appears in good general condition, not in acute distress.   SKIN: No Ecchymosis.  EYES: No jaundice. No Pallor.  LYMPHATIC: No cervical, supraclavicular or axillary lymphadenopathy.  CHEST: Normal respiratory effort.   ABDOMEN: Soft. No tenderness. No Hepatomegaly. No Splenomegaly. No masses.    DIAGNOSTIC DATA:     Results from last 7 days   Lab Units 05/13/25  1553   WBC 10*3/mm3 11.75*   NEUTROS ABS 10*3/mm3 6.01   HEMOGLOBIN g/dL 11.9*   HEMATOCRIT % 36.4*   PLATELETS 10*3/mm3 320         Lab 05/13/25  1553   IRON 70   IRON SATURATION (TSAT) 22   TIBC 325   TRANSFERRIN 218   FERRITIN 290.00   FOLATE 18.00   VITAMIN B 12 >2,000*      Component      Latest Ref Rng 11/29/2023 5/29/2024 11/26/2024 5/13/2025   Reticulocyte %      0.70 - 1.90 % 1.20  1.17  0.91  0.98      Assessment & Plan    *Lymphocytosis.  It dates back to 2015 when lymphocyte count was 4600 on 10/22/2015.  Lymphocyte count was 4540 on 1/27/2023.  Lymphocyte count was 3780 on 3/22/2023.  Peripheral blood flow cytometry on 3/22/2023 CD3 positive, CD8 positive, T cell /large granular lymphocyte population representing 10% of the " total events suspicious for a clonal population by T-cell receptor analysis.  5/31/2023: Lymphocyte count 4360.  Based on the percentage of the lymphocytes, total LGL population is about 1000.  This represented LGL lymphocytosis.  11/29/2023: Lymphocyte count improved to 3700.  5/29/2024: Lymphocyte count increased to 4210.  11/26/2024: Lymphocyte count increased to 4400.  WBC increased to 13,550.  Neutrophils increased to 8190.  The increase in the WBC count and neutrophil and lymphocyte count is attributed to a steroid injection to the left hip on 11/13/2024.  5/13/2025: Lymphocyte count increased to 4690.  Exam revealed no lymphadenopathy or splenomegaly.  Neutrophils and platelets remain normal.    *Iron deficiency anemia.  Hemoglobin was 13.4 on 12/3/2019  Hemoglobin was 13.4 on 3/22/2023.  5/31/2023: Hemoglobin 13.2.  11/29/2023: Hemoglobin decreased to 12.9.  Anemia workup revealed borderline transferrin saturation of 20%.  He was started on ferrous sulfate 325 mg every other day.  5/29/2024: Hemoglobin improved to 13.5.  Transferrin saturation improved to 24%.  Ferritin 146.  11/26/2024: Hemoglobin 13.3.  Ferritin 183.  Transferrin saturation decreased to 13%.  Ferrous sulfate was increased to 4 days a week.  5/13/2025: Hemoglobin decreased to 11.9.  Ferritin 290.  Transferrin saturation 22%.  Patient has chronic kidney disease, stage 3b, contributing to anemia.    *Vitamin B12 deficiency anemia.  11/20/2023: Vitamin B12 was 497.  Methylmalonic acid level was 485.  Patient was started on vitamin B12 1000 mcg daily.  5/29/2024: Hemoglobin improved to 13.5.  11/26/2024: Hemoglobin 13.3.  Vitamin B12 improved to 1711.  Folate 11.2.  He is taking vitamin B12 daily regularly.  5/13/2025: Vitamin B12 >2000 -indicating adequate replacement.  Folate 18.    PLAN:    1.  We will continue to monitor the LGL lymphocytosis.  2.  Continue ferrous sulfate 4 days a week.  3.  Continue vitamin B12 1000 mcg daily.  4.  We  will see him in follow-up in 6 months.  CBC reticulocyte count ferritin iron panel vitamin B12 folate and methylmalonic acid levels will be obtained.        Ruth Salter MD  05/13/25

## 2025-05-15 DIAGNOSIS — E78.2 MIXED HYPERLIPIDEMIA: ICD-10-CM

## 2025-05-15 DIAGNOSIS — N40.0 BENIGN PROSTATIC HYPERPLASIA WITHOUT LOWER URINARY TRACT SYMPTOMS: ICD-10-CM

## 2025-05-15 DIAGNOSIS — E55.9 VITAMIN D DEFICIENCY: ICD-10-CM

## 2025-05-15 DIAGNOSIS — I10 UNCONTROLLED HYPERTENSION: ICD-10-CM

## 2025-05-15 DIAGNOSIS — Z00.00 ENCOUNTER FOR ANNUAL WELLNESS VISIT (AWV) IN MEDICARE PATIENT: Primary | ICD-10-CM

## 2025-05-15 DIAGNOSIS — D72.820 LYMPHOCYTOSIS: ICD-10-CM

## 2025-05-15 DIAGNOSIS — R60.0 EDEMA OF LOWER EXTREMITY: ICD-10-CM

## 2025-05-15 DIAGNOSIS — N17.9 AKI (ACUTE KIDNEY INJURY): ICD-10-CM

## 2025-05-15 DIAGNOSIS — I10 ESSENTIAL HYPERTENSION: ICD-10-CM

## 2025-05-15 DIAGNOSIS — E11.9 CONTROLLED TYPE 2 DIABETES MELLITUS WITHOUT COMPLICATION, WITHOUT LONG-TERM CURRENT USE OF INSULIN: ICD-10-CM

## 2025-05-15 DIAGNOSIS — R79.9 ABNORMAL BLOOD FINDINGS: ICD-10-CM

## 2025-05-15 DIAGNOSIS — R79.89 ELEVATED TROPONIN: ICD-10-CM

## 2025-05-15 DIAGNOSIS — N18.32 CKD STAGE 3B, GFR 30-44 ML/MIN: ICD-10-CM

## 2025-05-17 LAB
25(OH)D3+25(OH)D2 SERPL-MCNC: 46.2 NG/ML (ref 30–100)
ALBUMIN SERPL-MCNC: 4 G/DL (ref 3.8–4.8)
ALBUMIN/CREAT UR: 4 MG/G CREAT (ref 0–29)
ALP SERPL-CCNC: 87 IU/L (ref 44–121)
ALT SERPL-CCNC: 12 IU/L (ref 0–44)
APPEARANCE UR: CLEAR
AST SERPL-CCNC: 14 IU/L (ref 0–40)
BACTERIA #/AREA URNS HPF: NORMAL /[HPF]
BASOPHILS # BLD AUTO: 0 X10E3/UL (ref 0–0.2)
BASOPHILS NFR BLD AUTO: 0 %
BILIRUB SERPL-MCNC: 0.6 MG/DL (ref 0–1.2)
BILIRUB UR QL STRIP: NEGATIVE
BUN SERPL-MCNC: 54 MG/DL (ref 8–27)
BUN/CREAT SERPL: 25 (ref 10–24)
CALCIUM SERPL-MCNC: 9.7 MG/DL (ref 8.6–10.2)
CASTS URNS QL MICRO: NORMAL /LPF
CHLORIDE SERPL-SCNC: 102 MMOL/L (ref 96–106)
CHOLEST SERPL-MCNC: 199 MG/DL (ref 100–199)
CHOLEST/HDLC SERPL: 4.9 RATIO (ref 0–5)
CO2 SERPL-SCNC: 21 MMOL/L (ref 20–29)
COLOR UR: YELLOW
CREAT SERPL-MCNC: 2.16 MG/DL (ref 0.76–1.27)
CREAT UR-MCNC: 80.5 MG/DL
EGFRCR SERPLBLD CKD-EPI 2021: 31 ML/MIN/1.73
EOSINOPHIL # BLD AUTO: 0.3 X10E3/UL (ref 0–0.4)
EOSINOPHIL NFR BLD AUTO: 3 %
EPI CELLS #/AREA URNS HPF: NORMAL /HPF (ref 0–10)
ERYTHROCYTE [DISTWIDTH] IN BLOOD BY AUTOMATED COUNT: 13.8 % (ref 11.6–15.4)
GLOBULIN SER CALC-MCNC: 2.7 G/DL (ref 1.5–4.5)
GLUCOSE SERPL-MCNC: 115 MG/DL (ref 70–99)
GLUCOSE UR QL STRIP: NEGATIVE
HBA1C MFR BLD: 6.2 % (ref 4.8–5.6)
HCT VFR BLD AUTO: 38 % (ref 37.5–51)
HDLC SERPL-MCNC: 41 MG/DL
HGB BLD-MCNC: 12 G/DL (ref 13–17.7)
HGB UR QL STRIP: NEGATIVE
IMM GRANULOCYTES # BLD AUTO: 0 X10E3/UL (ref 0–0.1)
IMM GRANULOCYTES NFR BLD AUTO: 0 %
KETONES UR QL STRIP: NEGATIVE
LDLC SERPL CALC-MCNC: 135 MG/DL (ref 0–99)
LEUKOCYTE ESTERASE UR QL STRIP: NEGATIVE
LYMPHOCYTES # BLD AUTO: 4.2 X10E3/UL (ref 0.7–3.1)
LYMPHOCYTES NFR BLD AUTO: 44 %
MCH RBC QN AUTO: 30.7 PG (ref 26.6–33)
MCHC RBC AUTO-ENTMCNC: 31.6 G/DL (ref 31.5–35.7)
MCV RBC AUTO: 97 FL (ref 79–97)
METHYLMALONATE SERPL-SCNC: 445 NMOL/L (ref 0–378)
MICRO URNS: NORMAL
MICRO URNS: NORMAL
MICROALBUMIN UR-MCNC: 3.1 UG/ML
MONOCYTES # BLD AUTO: 0.6 X10E3/UL (ref 0.1–0.9)
MONOCYTES NFR BLD AUTO: 6 %
NEUTROPHILS # BLD AUTO: 4.5 X10E3/UL (ref 1.4–7)
NEUTROPHILS NFR BLD AUTO: 47 %
NITRITE UR QL STRIP: NEGATIVE
PH UR STRIP: 6 [PH] (ref 5–7.5)
PLATELET # BLD AUTO: 324 X10E3/UL (ref 150–450)
POTASSIUM SERPL-SCNC: 5.8 MMOL/L (ref 3.5–5.2)
PROT SERPL-MCNC: 6.7 G/DL (ref 6–8.5)
PROT UR QL STRIP: NEGATIVE
RBC # BLD AUTO: 3.91 X10E6/UL (ref 4.14–5.8)
RBC #/AREA URNS HPF: NORMAL /HPF (ref 0–2)
SODIUM SERPL-SCNC: 139 MMOL/L (ref 134–144)
SP GR UR STRIP: 1.01 (ref 1–1.03)
TRIGL SERPL-MCNC: 125 MG/DL (ref 0–149)
TSH SERPL DL<=0.005 MIU/L-ACNC: 2.01 UIU/ML (ref 0.45–4.5)
URINALYSIS REFLEX: NORMAL
UROBILINOGEN UR STRIP-MCNC: 0.2 MG/DL (ref 0.2–1)
VLDLC SERPL CALC-MCNC: 23 MG/DL (ref 5–40)
WBC # BLD AUTO: 9.6 X10E3/UL (ref 3.4–10.8)
WBC #/AREA URNS HPF: NORMAL /HPF (ref 0–5)

## 2025-05-19 ENCOUNTER — RESULTS FOLLOW-UP (OUTPATIENT)
Dept: INTERNAL MEDICINE | Facility: CLINIC | Age: 80
End: 2025-05-19
Payer: MEDICARE

## 2025-05-23 ENCOUNTER — OFFICE VISIT (OUTPATIENT)
Dept: INTERNAL MEDICINE | Facility: CLINIC | Age: 80
End: 2025-05-23
Payer: MEDICARE

## 2025-05-23 VITALS
HEART RATE: 56 BPM | DIASTOLIC BLOOD PRESSURE: 66 MMHG | BODY MASS INDEX: 34.96 KG/M2 | HEIGHT: 72 IN | SYSTOLIC BLOOD PRESSURE: 130 MMHG | OXYGEN SATURATION: 99 % | TEMPERATURE: 97.8 F | WEIGHT: 258.1 LBS

## 2025-05-23 DIAGNOSIS — E66.01 CLASS 2 SEVERE OBESITY DUE TO EXCESS CALORIES WITH SERIOUS COMORBIDITY AND BODY MASS INDEX (BMI) OF 35.0 TO 35.9 IN ADULT: ICD-10-CM

## 2025-05-23 DIAGNOSIS — I87.8 VENOUS STASIS OF BOTH LOWER EXTREMITIES: ICD-10-CM

## 2025-05-23 DIAGNOSIS — D72.820 LYMPHOCYTOSIS: ICD-10-CM

## 2025-05-23 DIAGNOSIS — G47.33 OSA ON CPAP: ICD-10-CM

## 2025-05-23 DIAGNOSIS — E78.2 MIXED HYPERLIPIDEMIA: ICD-10-CM

## 2025-05-23 DIAGNOSIS — I35.0 MILD AORTIC VALVE STENOSIS: ICD-10-CM

## 2025-05-23 DIAGNOSIS — I35.9 AORTIC VALVE CALCIFICATION: ICD-10-CM

## 2025-05-23 DIAGNOSIS — Z23 NEED FOR VACCINATION AGAINST STREPTOCOCCUS PNEUMONIAE: ICD-10-CM

## 2025-05-23 DIAGNOSIS — M16.0 PRIMARY OSTEOARTHRITIS OF BOTH HIPS: ICD-10-CM

## 2025-05-23 DIAGNOSIS — C44.92 SQUAMOUS CELL CARCINOMA OF SKIN: ICD-10-CM

## 2025-05-23 DIAGNOSIS — N40.0 BENIGN PROSTATIC HYPERPLASIA WITHOUT LOWER URINARY TRACT SYMPTOMS: ICD-10-CM

## 2025-05-23 DIAGNOSIS — C18.9 MALIGNANT NEOPLASM OF COLON, UNSPECIFIED PART OF COLON: ICD-10-CM

## 2025-05-23 DIAGNOSIS — J18.9 PNEUMONIA DUE TO INFECTIOUS ORGANISM, UNSPECIFIED LATERALITY, UNSPECIFIED PART OF LUNG: ICD-10-CM

## 2025-05-23 DIAGNOSIS — Z00.01 ENCOUNTER FOR GENERAL ADULT MEDICAL EXAMINATION WITH ABNORMAL FINDINGS: ICD-10-CM

## 2025-05-23 DIAGNOSIS — N18.32 CKD STAGE 3B, GFR 30-44 ML/MIN: ICD-10-CM

## 2025-05-23 DIAGNOSIS — E66.812 CLASS 2 SEVERE OBESITY DUE TO EXCESS CALORIES WITH SERIOUS COMORBIDITY AND BODY MASS INDEX (BMI) OF 35.0 TO 35.9 IN ADULT: ICD-10-CM

## 2025-05-23 DIAGNOSIS — E87.5 HYPERKALEMIA: ICD-10-CM

## 2025-05-23 DIAGNOSIS — J15.9 BACTERIAL PNEUMONIA: ICD-10-CM

## 2025-05-23 DIAGNOSIS — E11.9 CONTROLLED TYPE 2 DIABETES MELLITUS WITHOUT COMPLICATION, WITHOUT LONG-TERM CURRENT USE OF INSULIN: ICD-10-CM

## 2025-05-23 DIAGNOSIS — E55.9 VITAMIN D DEFICIENCY: ICD-10-CM

## 2025-05-23 DIAGNOSIS — I10 ESSENTIAL HYPERTENSION: ICD-10-CM

## 2025-05-23 DIAGNOSIS — I51.89 DIASTOLIC DYSFUNCTION WITHOUT HEART FAILURE: ICD-10-CM

## 2025-05-23 DIAGNOSIS — Z00.00 ENCOUNTER FOR SUBSEQUENT ANNUAL WELLNESS VISIT (AWV) IN MEDICARE PATIENT: Primary | ICD-10-CM

## 2025-05-23 PROBLEM — R29.818 SUSPECTED SLEEP APNEA: Status: RESOLVED | Noted: 2023-08-23 | Resolved: 2025-05-23

## 2025-05-23 PROBLEM — R79.89 ELEVATED TROPONIN: Status: RESOLVED | Noted: 2025-03-16 | Resolved: 2025-05-23

## 2025-05-23 PROBLEM — R60.0 EDEMA OF LOWER EXTREMITY: Status: RESOLVED | Noted: 2025-03-20 | Resolved: 2025-05-23

## 2025-05-23 RX ORDER — ALLOPURINOL 100 MG/1
1 TABLET ORAL DAILY
COMMUNITY
Start: 2025-04-23

## 2025-05-23 NOTE — PROGRESS NOTES
Subjective   Dileep Hamilton is a 79 y.o. male who presents for a Medicare Well Visit    Patient Care Team:  Juan A Rubi MD as PCP - General  Juan A Rubi MD as PCP - Family Medicine  Crys Colón MD (Inactive) as Consulting Physician (Ophthalmology)  Ruth Salter MD as Consulting Physician (Hematology and Oncology)  Juan A Rubi MD as Referring Physician (Internal Medicine)    Recent Hospitalizations: Recently treated at the following:  [facility]    Depression Screen:       2025    11:30 AM   PHQ-2/PHQ-9 Depression Screening   Little interest or pleasure in doing things Not at all   Feeling down, depressed, or hopeless Not at all   How difficult have these problems made it for you to do your work, take care of things at home, or get along with other people? Not difficult at all       Functional and Cognitive Screenin/16/2025     9:29 AM   Functional & Cognitive Status   Do you have difficulty preparing food and eating? No   Do you have difficulty bathing yourself, getting dressed or grooming yourself? No   Do you have difficulty using the toilet? No   Do you have difficulty moving around from place to place? No   Do you have trouble with steps or getting out of a bed or a chair? No   Current Diet Well Balanced Diet   Dental Exam Up to date   Eye Exam Up to date   Exercise (times per week) 3 times per week   Current Exercises Include Cardiovascular Workout;Home Fitness Gym;Light Weights;Stationary Bicycling/Spin Class;Weightlifting   Do you need help using the phone?  No   Are you deaf or do you have serious difficulty hearing?  No   Do you need help to go to places out of walking distance? No   Do you need help shopping? No   Do you need help preparing meals?  No   Do you need help with housework?  No   Do you need help with laundry? No   Do you need help taking your medications? No   Do you need help managing money? No   Do you ever drive or ride in a car without  "wearing a seat belt? No   Have you felt unusual stress, anger or loneliness in the last month? No   Who do you live with? Spouse   If you need help, do you have trouble finding someone available to you? No   Have you been bothered in the last four weeks by sexual problems? No   Do you have difficulty concentrating, remembering or making decisions? No       Does the patient have evidence of cognitive impairment? no    No opioid medication identified on active medication list. I have reviewed chart for other potential  high risk medication/s and harmful drug interactions in the elderly.          Does not need ASA (and currently is not on it)    Vitals:    05/23/25 1130   BP: 130/66   BP Location: Left arm   Patient Position: Sitting   Cuff Size: Adult   Pulse: 56   Temp: 97.8 °F (36.6 °C)   TempSrc: Infrared   SpO2: 99%   Weight: 117 kg (258 lb 1.6 oz)   Height: 182 cm (71.65\")   PainSc: 0-No pain       Body mass index is 35.34 kg/m².    Visual Acuity:  No results found.    Advanced Care Planning:  ACP discussion was held with the patient during this visit. Patient has an advance directive (not in EMR), copy requested.    Compared to one year ago, the patient feels physical health is better.  Compared to one year ago, the patient feels mental health is the same.    Reviewed chart for potential of high risk medication in the elderly: yes  Reviewed chart for potential of harmful drug interactions in the elderly:yes    Identification of Risk Factors:  Risk factors include: Advance Directive Discussion  Cardiovascular risk  Colon Cancer Screening  Diabetic Lab Screening   Glaucoma Risk  Immunizations Discussed/Encouraged (specific immunizations; Tdap, Hepatitis A Vaccine/Series, Influenza, Pneumococcal 23, Prevnar 20 (Pneumococcal 20-valent conjugate), Shingrix, COVID19, and RSV (Respiratory Syncytial Virus) )  Obesity/Overweight   Prostate Cancer Screening .    Patient Self-Management and Personalized Health Advice  The " patient has been provided with information about: diet, exercise, weight management, and prevention of cardiac or vascular disease and preventive services including:   Annual Wellness Visit (AWV)  Pneumococcal Vaccine and Administration  Prostate Cancer Screening .  Follow Up: Medicare visit in one year    Discussed the patient's BMI with him. The BMI is above average; BMI management plan is completed.    Patient has multiple medical problems which are significant and separately identifiable that require additional work above and beyond the Medicare Wellness Visit. These are not trivial or insignificant and are billed with a 25-modifier    Chief Complaint   Patient presents with    Medicare Wellness-subsequent     Review of medical issues.       HPI:  Dileep Hamilton is a 79 y.o. male RTC in yearly AWV, review of medical issues:   1.  PNA - s/p admission 3/16-3/18/25  for PNA with sepsis. Resolved.  NO cough, no fevers, feels like fully resolved. Is back to routine activities, energy is OK.   2. Essential HTN, Renal duplex with normal RA B after past CR increase on thiazide diuretic trial; CKD IIIb - on CCB and ARB, off HCTZ with renal and started on torsemide increased to 20mg daily.    3. Hyperkalemia - has seen renal, no secondary etiology.  Insureance did not cover patiromer, did not get. Called renal and talked with APRN last night, told to get samples or recheck.  Has appt with MD at renal 6/2/25.    4. Colon CA, cecum - Stage I, s/p R colon resection in 12/5/2014.  C-scope clear 3/2018 with Dr. Sin --> repeat in 5 years, overdue.  Has not called to schedule, 'not yet, all this other stuff come up, and I put it on the back burner'.   5. AIDEE - saw sleep med in 2022, sleep study deferred as pt decliend CPAP use.  Pt did have at home study and is now on CPAP.  'I have to give you that', and is using CPAP.  Is doing 'not bad' on CPAP. Initially not controlled with nasal pillow.  Called up med supply and changed  "to mouthpiece and 'that seems to work better'.  Only issue is getting to sleep is 'a little bit harder' but is making changes and is getting to sleep. Does not feel any different yet.   6. Lymphocytosis - S/p hematology eval  Developing LGL leukemia. On Watchful waiting with Heme, UTD 5/13/25.   7. DMII with long hx obesity - weight has been stable. Diet stable, no changes. \"I am just trying to watch the sodium level\". Is eating fruits and vegetables daily, but in controlled quanitities. Saw optho in 2025, 'fine, everything is fine'.   8. HLD, intolerant to Pravastatin with CoQ10 - tolerating Livalo now and no side effects.     9. Aortic calcification without stenosis - noted on ECHO 2016, patient had deferred echo in past.  Did have echo completed in the hospital 3/2025  10. SCC of skin -sees Derm annually, saw 12/2024.  No new cancers noted.  No lesions of concern today.   11. Vitamin D deficiency - on daily 800 I.U. Vitamin D3.   12. Gout - had 'very few events until recently'.  TOld nephrology about 'my feet swelling' but did not seek care during acute phase. Started on allopurinol by nephrology. Tolerating OK, had a few gout events since on med.  No colchcine use.   13.  -agrees to get Prevnar 20 update today      Review of Systems   Constitutional: Negative for chills, fever, malaise/fatigue, weight gain and weight loss.   HENT:  Negative for congestion, hearing loss, odynophagia and sore throat.    Eyes:  Negative for discharge, double vision, pain and redness.   Cardiovascular:  Negative for chest pain, dyspnea on exertion, irregular heartbeat, leg swelling, near-syncope, palpitations and syncope.   Respiratory:  Negative for cough, shortness of breath, sleep disturbances due to breathing (on CPAP) and snoring.    Endocrine: Negative for polydipsia, polyphagia and polyuria.   Skin:  Negative for rash and suspicious lesions.   Musculoskeletal:  Negative for joint pain, joint swelling, muscle cramps, muscle " weakness and myalgias.   Gastrointestinal:  Negative for bowel incontinence, constipation, diarrhea, dysphagia, heartburn, hematochezia, melena, nausea and vomiting.   Genitourinary:  Negative for dysuria, frequency, hematuria, hesitancy and incomplete emptying.   Neurological:  Negative for dizziness, headaches and light-headedness.   Psychiatric/Behavioral:  Negative for depression. The patient does not have insomnia and is not nervous/anxious.    Allergic/Immunologic: Negative for environmental allergies and persistent infections.       @OBJECTIVE BEGIN@  Vitals:    05/23/25 1130   BP: 130/66   Pulse: 56   Temp: 97.8 °F (36.6 °C)   SpO2: 99%     Physical Exam  Vitals reviewed.   Constitutional:       General: He is not in acute distress.     Appearance: Normal appearance. He is well-developed. He is obese. He is not ill-appearing or toxic-appearing.   HENT:      Head: Normocephalic and atraumatic.      Right Ear: Hearing, tympanic membrane, ear canal and external ear normal. There is no impacted cerumen.      Left Ear: Hearing, tympanic membrane, ear canal and external ear normal. There is no impacted cerumen.      Nose: Nose normal.      Mouth/Throat:      Mouth: Mucous membranes are moist. No oral lesions.      Tongue: No lesions.      Pharynx: Oropharynx is clear. Uvula midline. No pharyngeal swelling, oropharyngeal exudate, posterior oropharyngeal erythema or uvula swelling.      Comments: Class II-III Mallampati  Eyes:      General: Lids are normal. No scleral icterus.        Right eye: No discharge.         Left eye: No discharge.      Extraocular Movements: Extraocular movements intact.      Conjunctiva/sclera: Conjunctivae normal.      Pupils: Pupils are equal, round, and reactive to light.   Neck:      Thyroid: No thyroid mass or thyromegaly.      Vascular: No carotid bruit.   Cardiovascular:      Rate and Rhythm: Normal rate. Rhythm irregular.      Pulses:           Carotid pulses are 2+ on the right  side and 2+ on the left side.       Radial pulses are 2+ on the right side and 2+ on the left side.        Dorsalis pedis pulses are 2+ on the right side and 2+ on the left side.      Heart sounds: S1 normal and S2 normal. Murmur (RUSB  and LLSB) heard.      Systolic murmur is present with a grade of 3/6.      No friction rub. No gallop.      Comments: Diffuse, scattered spider veins in BLE  Pulmonary:      Effort: Pulmonary effort is normal. No respiratory distress.      Breath sounds: Normal breath sounds. No wheezing, rhonchi or rales.   Abdominal:      General: Abdomen is protuberant. Bowel sounds are normal. There is no distension.      Palpations: Abdomen is soft. There is no mass.      Tenderness: There is no abdominal tenderness. There is no guarding or rebound.   Genitourinary:     Prostate: Enlarged (Diffuse, symmetric). Not tender and no nodules present.      Rectum: External hemorrhoid (Multiple, nonthrombosed) present. No tenderness. Normal anal tone.   Musculoskeletal:         General: No deformity. Normal range of motion.      Right shoulder: No tenderness, bony tenderness or crepitus. Normal range of motion.      Left shoulder: No tenderness, bony tenderness or crepitus. Normal range of motion.      Right hand: No tenderness or bony tenderness. Normal range of motion. Normal strength.      Left hand: No tenderness or bony tenderness. Normal range of motion. Normal strength.      Cervical back: Full passive range of motion without pain, normal range of motion and neck supple.      Right lower le+ Pitting Edema (At ankle) present.      Left lower le+ Pitting Edema (At ankle) present.      Right foot: Normal range of motion. No deformity or bunion.      Left foot: Normal range of motion. No deformity or bunion.   Feet:      Right foot:      Skin integrity: No ulcer, blister or skin breakdown.      Left foot:      Skin integrity: No ulcer, blister or skin breakdown.      Comments: PT pulses  obscured by edema bilaterally    Lymphadenopathy:      Cervical: No cervical adenopathy.      Right cervical: No superficial, deep or posterior cervical adenopathy.     Left cervical: No superficial, deep or posterior cervical adenopathy.      Upper Body:      Right upper body: No supraclavicular, axillary or pectoral adenopathy.      Left upper body: No supraclavicular, axillary or pectoral adenopathy.   Skin:     General: Skin is warm and dry.      Findings: No rash.      Comments: Diffuse scattered SKs on face/scalp, chest, torso, back   Neurological:      Mental Status: He is alert and oriented to person, place, and time.      Cranial Nerves: No cranial nerve deficit, dysarthria or facial asymmetry.      Sensory: No sensory deficit.      Motor: No weakness, tremor, atrophy or abnormal muscle tone.      Gait: Gait normal.      Deep Tendon Reflexes: Reflexes are normal and symmetric.      Reflex Scores:       Patellar reflexes are 2+ on the right side and 2+ on the left side.       Achilles reflexes are 2+ on the right side and 2+ on the left side.  Psychiatric:         Attention and Perception: Attention normal.         Mood and Affect: Mood normal.         Speech: Speech normal.         Behavior: Behavior normal. Behavior is cooperative.         Thought Content: Thought content normal.             ECG 12 Lead    Date/Time: 5/23/2025 1:00 PM  Performed by: Juan A Rubi MD    Authorized by: Juan A Rubi MD  Comparison: compared with previous ECG from 3/22/2025  Similar to previous ECG  Rhythm: sinus bradycardia  Rate: bradycardic  BPM: 56  ST Segments: ST segments normal  T Waves: T waves normal  QRS axis: normal    Clinical impression: normal ECG  Comments: QTc - 413  Indication - irregular HR on exam; known AIDEE        XR chest: F/U PNA;  comparison 3/16/25 CXR  Lungs clear, no acute airspace disease  Largely resolved R linear and perihilar opacities noted on prior XR  No mass   No effusions  No  cardiomegaly  Calcified granulomas and mediastinal lymph nodes noted    Assessment & Plan   Diagnoses and all orders for this visit:    1. Encounter for subsequent annual wellness visit (AWV) in Medicare patient (Primary)    2. Encounter for general adult medical examination with abnormal findings    3. CKD stage 3b, GFR 30-44 ml/min  -     Basic Metabolic Panel    4. Pneumonia due to infectious organism, unspecified laterality, unspecified part of lung  -     XR Chest PA & Lateral    5. Class 2 severe obesity due to excess calories with serious comorbidity and body mass index (BMI) of 35.0 to 35.9 in adult    6. Aortic valve calcification    7. Bacterial pneumonia    8. Benign prostatic hyperplasia without lower urinary tract symptoms    9. Controlled type 2 diabetes mellitus without complication, without long-term current use of insulin    10. Essential hypertension  -     Basic Metabolic Panel    11. Mixed hyperlipidemia    12. Lymphocytosis    13. Malignant neoplasm of colon, unspecified part of colon    14. Mild aortic valve stenosis    15. Primary osteoarthritis of both hips    16. Squamous cell carcinoma of skin    17. Venous stasis of both lower extremities    18. Vitamin D deficiency    19. Diastolic dysfunction without heart failure    20. Need for vaccination against Streptococcus pneumoniae  -     Pneumococcal Conjugate Vaccine 20-Valent All    21. AIDEE on CPAP    22. Hyperkalemia  -     Basic Metabolic Panel    Other orders  -     ECG 12 Lead          Diagnoses and all orders for this visit:    Encounter for subsequent annual wellness visit (AWV) in Medicare patient    Encounter for general adult medical examination with abnormal findings    CKD stage 3b, GFR 30-44 ml/min  -     Basic Metabolic Panel    Pneumonia due to infectious organism, unspecified laterality, unspecified part of lung  -     XR Chest PA & Lateral    Class 2 severe obesity due to excess calories with serious comorbidity and body mass  index (BMI) of 35.0 to 35.9 in adult    Aortic valve calcification    Bacterial pneumonia    Benign prostatic hyperplasia without lower urinary tract symptoms    Controlled type 2 diabetes mellitus without complication, without long-term current use of insulin    Essential hypertension  -     Basic Metabolic Panel    Mixed hyperlipidemia    Lymphocytosis    Malignant neoplasm of colon, unspecified part of colon    Mild aortic valve stenosis    Primary osteoarthritis of both hips    Squamous cell carcinoma of skin    Venous stasis of both lower extremities    Vitamin D deficiency    Diastolic dysfunction without heart failure    Need for vaccination against Streptococcus pneumoniae  -     Pneumococcal Conjugate Vaccine 20-Valent All    AIDEE on CPAP    Hyperkalemia  -     Basic Metabolic Panel    Other orders  -     allopurinol (ZYLOPRIM) 100 MG tablet; Take 1 tablet by mouth Daily.  -     Patiromer Sorbitex Calcium (VELTASSA) 8.4 g pack; Take 1 packet by mouth.  -     ECG 12 Lead            Dileep Hamilton is a 79 y.o. male RTC in yearly AWV, review of medical issues:   1.  PNA - s/p admission 3/16-3/18/25  for PNA with sepsis. Resolved sx s/p complete ABX course.  CXR repeated in office today shows resolved perihilar infiltrates and right lower lobe linear infiltrate.    2. Essential HTN with mild LVH, Renal duplex with normal RA B after past CR increase on thiazide diuretic trial; CKD IIIb -controlled on CCB and ARB with transition from thiazide diuretic to loop diuretic given report of gout to nephrology.  Good home log, reviewed today.  3. Hyperkalemia -s/p nephrology evaluation, no secondary etiology noted.  Unable to obtain potassium binder due to insurance coverage and subsequently had loop diuretic increased by nephrology.  Recent labs show some progression of creatinine as well as persistent high potassium.  Per D/W nephrology by patient, will recheck BMP today and forward results to them to coordinate  medication adjustment.  Nephrology appt with MD at renal 6/2/25.    4. Colon CA, cecum - Stage I, s/p R colon resection in 12/5/2014.  C-scope clear 3/2018 with Dr. Sin --> repeat in 5 years, overdue.  Reviewed with patient again today, agrees to call and make appointment.   5. Severe L lateral hip pain, Mild arthritic changes of both hips without hip fracture and Chronic bilateral hamstring origin tendinopathy - s/p LEFT HIP OPEN TROCHANTERIC BURSECTOMY, ILIOTIBIAL BAND TENOTOMY 4/19/23 with Dr. Yañez with additional resolved pre-op pain. Recurrent pain 11/2023 with new dx labral tear on MRI and s/p injection with sports medicine, Dr. Powell.  Pain remains improved today.  Issue deferred.   6. AIDEE, saw sleep med in 2022, sleep study deferred as pt decliend CPAP use -s/p sleep medicine reevaluation since last visit, new Dx AIDEE.  Currently tolerating CPAP nightly, recent transition from nasal pillow to oral mask.  F/U sleep med as planned.  7. Lymphocytosis -UTD hematology 5/2025, watchful waiting for LGL lymphocytosis  8. DMII with long hx obesity -remains diet controlled, A1c at 6.2%.  C/W TLC diet efforts.  Will trend weight and A1c as patient pursues AIDEE Tx, likely confounder.  Optho UTD. Umicroalb/Cr (-) on ARB.  9. HLD, intolerant to Pravastatin with CoQ10 - tolerating Livalo now.  Consideration up titration dosing for LDL goal < 100, but deferred today given active issues in #3   10. Aortic calcification without stenosis --> Mild AS on 2025 ECHO, associated MV annular calcification - audible RUSB and LLSB murmur on exam.  Will pursue echo in 1 year given new Dx mild AS in 2025   11. Stasis dermatitis, mild - compression socks daily advised.   12. SCC of skin -sees Derm annually, UTD 12/2024.  13. Vitamin D deficiency - replete on daily 800 I.U. Vitamin D3.   14. Gout -presumptive Dx, after reports of transient joint pain and swelling to nephrology.  History today is somewhat vague with patient describing  frequent and transient joint pain issues, though feels has improved some off thiazide diuretic.  Allopurinol started, per nephrology.  Will trend uric acid next labs.  Would like to see patient in person for any acute gout flare events to help clarify diagnosis.    15. HM - labs d/w pt; Flu/ Td/ Pvax/ Prevnar/ Shingrix/ COVID - UTD; flu/COVID update/ RSV/ -advised at pharmacy in the fall; Tdap complete at pharmacy; Prevnar 20 update today; C-scope 3/2018 (-) --> 5 years, pt to schedule, counseled again today; ONEL OK, PSA deferred due to age; Hep C Ab (-) 2014; c/w exercise; Preventative care plan provided to pt at end of visit  --> Irregular heart rhythm noted on exam, suspect sinus arrhythmia.  EKG in office sinus bradycardia today.  Reassured patient.    Return in about 6 months (around 11/23/2025) for Recheck.  (CMP, A1c, uric acid, UA, direct LDL)          Current Outpatient Medications:     acetaminophen (TYLENOL) 325 MG tablet, Take 2 tablets by mouth Every 6 (Six) Hours As Needed for Mild Pain., Disp: , Rfl:     allopurinol (ZYLOPRIM) 100 MG tablet, Take 1 tablet by mouth Daily., Disp: , Rfl:     amLODIPine (NORVASC) 5 MG tablet, Take 2 tablets by mouth Daily., Disp: 180 tablet, Rfl: 1    Cholecalciferol (VITAMIN D3) 20 MCG (800 UNIT) tablet, Take 800 Int'l Units by mouth Daily., Disp: 75 tablet, Rfl:     Coenzyme Q10 (COQ-10) 100 MG capsule, Take 1 capsule by mouth Daily., Disp: , Rfl:     ferrous sulfate 325 (65 FE) MG tablet, Take 1 tablet by mouth 4 (Four) Times a Week. Mondays Wednesdays Fridays Sundays, Disp: , Rfl:     Multiple Vitamin (MULTI VITAMIN DAILY PO), Take 1 tablet by mouth Daily., Disp: , Rfl:     olmesartan (BENICAR) 40 MG tablet, Take 1 tablet by mouth once daily, Disp: 90 tablet, Rfl: 2    Omega-3 Fatty Acids (FISH OIL) 1000 MG capsule capsule, Take 1 capsule by mouth Daily With Breakfast., Disp: , Rfl:     Patiromer Sorbitex Calcium (VELTASSA) 8.4 g pack, Take 1 packet by mouth., Disp: ,  Rfl:     Pitavastatin Magnesium 1 MG tablet, Take 1 mg by mouth Daily., Disp: 90 tablet, Rfl: 2    torsemide (DEMADEX) 20 MG tablet, Take 1 tablet by mouth Every Other Day., Disp: , Rfl:     vitamin B-12 (CYANOCOBALAMIN) 1000 MCG tablet, Take 1 tablet by mouth Daily., Disp: , Rfl:     colchicine 0.6 MG tablet, Take 1 tablet by mouth As Needed for Muscle / Joint Pain (Gout flare ups). (Patient not taking: Reported on 5/23/2025), Disp: , Rfl:

## 2025-05-23 NOTE — PATIENT INSTRUCTIONS
Medicare Wellness  Personal Prevention Plan of Service     Date of Office Visit:    Encounter Provider:  Juan A Rubi MD  Place of Service:  [facility]  Patient Name: Dileep Hamilton  :  1945    As part of the Medicare Wellness portion of your visit today, we are providing you with this personalized preventive plan of services (PPPS). This plan is based upon recommendations of the [place] Preventive Services Task Force (USPSTF) and the Advisory Committee on Immunization Practices (ACIP).    This lists the preventive care services that should be considered, and provides dates of when you are due. Items listed as completed are up-to-date and do not require any further intervention.    Health Maintenance   Topic Date Due    DIABETIC FOOT EXAM  2021    RSV Vaccine - Adults (1 - 1-dose 75+ series) Never done    TDAP/TD VACCINES (2 - Tdap) 2024    COVID-19 Vaccine (6 - Pfizer risk - season) 2025    INFLUENZA VACCINE  2025    HEMOGLOBIN A1C  2025    DIABETIC EYE EXAM  2026    LIPID PANEL  2026    URINE MICROALBUMIN-CREATININE RATIO (uACR)  2026    ANNUAL WELLNESS VISIT  2026    HEPATITIS C SCREENING  Completed    Pneumococcal Vaccine 50+  Completed    ZOSTER VACCINE  Addressed    COLONOSCOPY  Discontinued       Orders Placed This Encounter   Procedures    XR Chest PA & Lateral     Release to patient:   Routine Release [[id number]]     Reason for Exam::   Pneumoani 3/2025, f/u imaging    Pneumococcal Conjugate Vaccine 20-Valent All    Basic Metabolic Panel     Release to patient:   Routine Release [[id number]]    ECG 12 Lead     This order was created via procedure documentation     Release to patient:   Routine Release [[id number]]       Return in about 6 months (around 2025) for Recheck.

## 2025-05-24 LAB
BUN SERPL-MCNC: 46 MG/DL (ref 8–23)
BUN/CREAT SERPL: 22.7 (ref 7–25)
CALCIUM SERPL-MCNC: 9.6 MG/DL (ref 8.6–10.5)
CHLORIDE SERPL-SCNC: 102 MMOL/L (ref 98–107)
CO2 SERPL-SCNC: 23.6 MMOL/L (ref 22–29)
CREAT SERPL-MCNC: 2.03 MG/DL (ref 0.76–1.27)
EGFRCR SERPLBLD CKD-EPI 2021: 33.1 ML/MIN/1.73
GLUCOSE SERPL-MCNC: 95 MG/DL (ref 65–99)
POTASSIUM SERPL-SCNC: 5 MMOL/L (ref 3.5–5.2)
SODIUM SERPL-SCNC: 139 MMOL/L (ref 136–145)

## 2025-06-16 ENCOUNTER — OFFICE VISIT (OUTPATIENT)
Dept: SLEEP MEDICINE | Facility: HOSPITAL | Age: 80
End: 2025-06-16
Payer: MEDICARE

## 2025-06-16 VITALS
WEIGHT: 261 LBS | HEIGHT: 72 IN | HEART RATE: 61 BPM | DIASTOLIC BLOOD PRESSURE: 55 MMHG | OXYGEN SATURATION: 96 % | BODY MASS INDEX: 35.35 KG/M2 | SYSTOLIC BLOOD PRESSURE: 160 MMHG

## 2025-06-16 DIAGNOSIS — G47.33 OSA (OBSTRUCTIVE SLEEP APNEA): Primary | ICD-10-CM

## 2025-06-16 PROCEDURE — G0463 HOSPITAL OUTPT CLINIC VISIT: HCPCS

## 2025-06-16 NOTE — PROGRESS NOTES
[facility]                          Chief Complaint:   F/up AIDEE    History of present illness:   Subjective     AIDEE:  Original complaints: Loud snoring, apnea and gasping for breath.     HST 25 : DAVID: 10 /h. Supine time was 108 minutes resulting in Supine DAVID: 18.2 /h.     ANGELES=1.8 /h; Chetan SpO2=84 % and hypoxic burden: 0.5 min.       Mask: Hybrid which does fit well.  No significant air leak or dry mouth  DME: [employer]    Sleep schedule:  -Bedtime: Midnight  -Sleep latency: Not long  -Wake up time: 6:20 AM, does feel refreshed  -Nocturnal awakenin-3 times because of pain.  No difficulties going back to sleep.      ESS: Total score: 0     Comorbid disease:   Resistant HTN and DM II.  On torsemide, amlodipine and olmesartan.    REVIEW OF SYSTEMS:   HEENT: No nasal congestion or postnasal drip   CARDIOVASCULAR: No chest pain, chest pressure or chest discomfort. No palpitations or edema.   RESPIRATORY: No shortness of breath, cough or sputum.   GASTROINTESTINAL: No abdominal bloating or reflux   NEUROLOGICAL/PSYCHIATRY: No depression nor anxiety    Past Medical History:  Past Medical History:   Diagnosis Date    Arthritis Hips    Bacterial pneumonia 2025    Benign prostatic hyperplasia 2    Borderline diabetes     Bursitis of hip 2002    Cataract Right eye 2020    Chronic pain disorder     Colon cancer     T1N0 stage I, s/p partial R colon resection     Diabetes type 2, controlled 2016    Family history of blood clots     Heart murmur 2016    Hernia Abdomen    Hip arthrosis 2002    History of cataract     B early     History of shingles         Hyperlipidemia     Hypertension     dx'd 2014    IFG (impaired fasting glucose)     Leukocytosis     Lumbar radiculitis 2022    Lung mass 2015    chronic per pt, distant imaging     Obesity     Personal history of scoliosis     mild    Proteinuria     Rotator cuff syndrome     Scoliosis  Lower back    Shingles outbreak 10/15/2017    Squamous cell carcinoma     skin    Stasis dermatitis     Trochanteric bursitis of left hip 03/24/2023    Formatting of this note might be different from the original.  Added automatically from request for surgery [id number]      Visual impairment Cataracts    Vitamin D deficiency    ,   Past Surgical History:   Procedure Laterality Date    BURSECTOMY Left 04/19/2023    CATARACT EXTRACTION Right 05/2020    CATARACT EXTRACTION Left 11/02/2022    COLON SURGERY  12/05/2013    COLONOSCOPY  2018    EPIDURAL Left 12/29/2022    Procedure: LUMBAR/SACRAL TRANSFORAMINAL EPIDURAL Left L4-5;  Surgeon: Tori Solitario MD;  Location: [facility];  Service: Pain Management;  Laterality: Left;    EPIDURAL BLOCK  12/29/2022    SUBTOTAL COLECTOMY  12/05/2014    colon ca 2014; R side of colon only    VASECTOMY      1978   ,   Family History   Problem Relation Age of Onset    Heart disease Mother     Diabetes Mother     Hypertension Mother     Lung cancer Mother     COPD Mother         `    Asthma Mother     Anesthesia problems Mother     Arthritis Mother     Arthritis Father     Stroke Father     Diabetes Father     Heart disease Father     Diabetes type II Sister     Diabetes type II Brother     No Known Problems Son     Diabetes Maternal Grandmother     Clotting disorder Other         Family history of blood clots   ,   Social History     Socioeconomic History    Marital status:      Spouse name: Tana    Number of children: 1   Tobacco Use    Smoking status: Never    Smokeless tobacco: Never   Vaping Use    Vaping status: Never Used   Substance and Sexual Activity    Alcohol use: No    Drug use: No    Sexual activity: Yes     Partners: Female     Birth control/protection: None, Vasectomy     Comment: wife only; no hx STD's     E-cigarette/Vaping    E-cigarette/Vaping Use Never User     Passive Exposure No     Counseling Given No      E-cigarette/Vaping Substances    Nicotine No   "   THC No     CBD No     Flavoring No      E-cigarette/Vaping Devices    Disposable No     Pre-filled or Refillable Cartridge No     Refillable Tank No     Pre-filled Pod No          , and Allergies:  Statins    Medication Review:     Current Outpatient Medications:     acetaminophen (TYLENOL) 325 MG tablet, Take 2 tablets by mouth Every 6 (Six) Hours As Needed for Mild Pain., Disp: , Rfl:     allopurinol (ZYLOPRIM) 100 MG tablet, Take 1 tablet by mouth Daily., Disp: , Rfl:     amLODIPine (NORVASC) 5 MG tablet, Take 2 tablets by mouth Daily., Disp: 180 tablet, Rfl: 1    Cholecalciferol (VITAMIN D3) 20 MCG (800 UNIT) tablet, Take 800 Int'l Units by mouth Daily., Disp: 75 tablet, Rfl:     Coenzyme Q10 (COQ-10) 100 MG capsule, Take 1 capsule by mouth Daily., Disp: , Rfl:     colchicine 0.6 MG tablet, Take 1 tablet by mouth As Needed for Muscle / Joint Pain (Gout flare ups). (Patient not taking: Reported on 5/23/2025), Disp: , Rfl:     ferrous sulfate 325 (65 FE) MG tablet, Take 1 tablet by mouth 4 (Four) Times a Week. Mondays Wednesdays Fridays Sundays, Disp: , Rfl:     Multiple Vitamin (MULTI VITAMIN DAILY PO), Take 1 tablet by mouth Daily., Disp: , Rfl:     olmesartan (BENICAR) 40 MG tablet, Take 1 tablet by mouth once daily, Disp: 90 tablet, Rfl: 2    Omega-3 Fatty Acids (FISH OIL) 1000 MG capsule capsule, Take 1 capsule by mouth Daily With Breakfast., Disp: , Rfl:     Patiromer Sorbitex Calcium (VELTASSA) 8.4 g pack, Take 1 packet by mouth., Disp: , Rfl:     Pitavastatin Magnesium 1 MG tablet, Take 1 mg by mouth Daily., Disp: 90 tablet, Rfl: 2    torsemide (DEMADEX) 20 MG tablet, Take 1 tablet by mouth Every Other Day., Disp: , Rfl:     vitamin B-12 (CYANOCOBALAMIN) 1000 MCG tablet, Take 1 tablet by mouth Daily., Disp: , Rfl:       Objective   Vital Signs:  Vitals:    06/16/25 1443   BP: 160/55   Pulse: 61   SpO2: 96%   Weight: 118 kg (261 lb)   Height: 182 cm (71.65\")     Body mass index is 35.74 kg/m².    "       Physical Exam:   General Appearance:    Alert, cooperative, in no acute distress   ENMT:  Freidman score 3, narrow distance in between the posterior pharyngeal pillars   Neck:  Large. Trachea midline. No thyromegaly.   Lungs:     Clear to auscultation,respirations regular, even and  unlabored    Heart:    Regular rhythm and normal rate, normal S1 and S2, no Murmur.   Abdomen:     Obese.  Soft.  No tenderness.  No HSM    Neuro:   Conscious, alert, oriented x3. Appropriate mood and affect.    Extremities:   Moves all extremities well, no edema, no cyanosis, no Redness              Diagnostic data:      CPAP download showed:  Date: Last 30 days  Usage (days): 77 %  Days used>4h: 67 %  AHI: 6.9/h  Leak: 14   Usage: 6h and 3 min  P 90% : 12  Auto CPAP: 5-15 cm H2O    Lab Results   Component Value Date    HGBA1C 6.2 (H) 05/16/2025     Total Cholesterol   Date Value Ref Range Status   04/28/2023 176 0 - 200 mg/dL Final   04/28/2023 180 0 - 200 mg/dL Final     Triglycerides   Date Value Ref Range Status   05/16/2025 125 0 - 149 mg/dL Final   04/28/2023 109 0 - 150 mg/dL Final   04/28/2023 112 0 - 150 mg/dL Final     HDL Cholesterol   Date Value Ref Range Status   05/16/2025 41 >39 mg/dL Final   04/28/2023 35 (L) 40 - 60 mg/dL Final   04/28/2023 37 (L) 40 - 60 mg/dL Final     Hemoglobin   Date Value Ref Range Status   05/16/2025 12.0 (L) 13.0 - 17.7 g/dL Final   05/13/2025 11.9 (L) 13.0 - 17.7 g/dL Final   12/03/2019 13.4 (L) 13.5 - 17.5 g/dL Final     CO2   Date Value Ref Range Status   03/22/2025 26.8 22.0 - 29.0 mmol/L Final     Total CO2   Date Value Ref Range Status   05/23/2025 23.6 22.0 - 29.0 mmol/L Final   12/03/2019 30 22 - 30 mmol/L Final        Assessment   AIDEE  Obesity, BMI 35  Resistant HTN        PLAN:    Discussed the result of the download.   Patient is compliant with therapy and clinically benefit from treatment.  Patient was encouraged to continue using PAP.  Refill supplies.  Adjust to auto CPAP  10-15 due to mild residual apnea  RTC 6 months with a download due to mild residual apnea  Discussed the association between obstructive sleep apnea and cardiovascular disease including HTN and the beneficial effect of Pap therapy in reducing the risk of major cardiovascular events.  Continue and refill losartan, torsemide and amlodipine.  Ambulatory monitoring of BP.  Counseled for weight loss.  Encouraged to exercise regularly and cut down on carbohydrates.  Discussed that losing weight may decrease the severity of sleep apnea and obviate the need of CPAP therapy.              This note was dictated utilizing Dragon dictation

## 2025-07-09 NOTE — TELEPHONE ENCOUNTER
Caller: Dileep Hamilton    Relationship: Self    Best call back number: [phone]    What medication are you requesting: DR. MONTALVO     What are your current symptoms: HIP PAIN     If a prescription is needed, what is your preferred pharmacy and phone number: [facility] [address] [place] [address] ([facility]), [place] - [address] [facility] [phone]  - [phone]     Additional notes: PATIENT STATED THAT DR. VILLAVICENCIO REFERRED HIM TO [facility] FOR [facility], BUT THEY CAN'T GET HIM IN FOR 2 WEEKS. PATIEN WANTS TO KNOW IF DR. VILLAVICENCIO WILL PRESCRIBE SOMETHING FOR HIS PAIN.           
Pt informed  
Will trial oral steroid taper to see if can settle sx some prior to being seen.  
Resulted

## 2025-08-15 DIAGNOSIS — I10 UNCONTROLLED HYPERTENSION: ICD-10-CM

## 2025-08-15 DIAGNOSIS — I10 ESSENTIAL HYPERTENSION: ICD-10-CM

## 2025-08-15 RX ORDER — OLMESARTAN MEDOXOMIL 40 MG/1
40 TABLET ORAL DAILY
Qty: 90 TABLET | Refills: 2 | Status: SHIPPED | OUTPATIENT
Start: 2025-08-15

## 2025-08-18 ENCOUNTER — TELEPHONE (OUTPATIENT)
Dept: SLEEP MEDICINE | Facility: HOSPITAL | Age: 80
End: 2025-08-18
Payer: MEDICARE

## 2025-08-29 ENCOUNTER — DOCUMENTATION (OUTPATIENT)
Dept: SLEEP MEDICINE | Facility: HOSPITAL | Age: 80
End: 2025-08-29
Payer: MEDICARE

## 2025-08-29 ENCOUNTER — TELEPHONE (OUTPATIENT)
Dept: SLEEP MEDICINE | Facility: HOSPITAL | Age: 80
End: 2025-08-29
Payer: MEDICARE

## 2025-08-29 DIAGNOSIS — G47.31 PRIMARY CENTRAL SLEEP APNEA: ICD-10-CM

## 2025-08-29 DIAGNOSIS — G47.33 OSA (OBSTRUCTIVE SLEEP APNEA): Primary | ICD-10-CM

## (undated) DEVICE — EPIDURAL TRAY: Brand: MEDLINE INDUSTRIES, INC.

## (undated) DEVICE — Device: Brand: PORTEX

## (undated) DEVICE — NDL SPINE 22G 5IN BLK

## (undated) DEVICE — GLV SURG TRIUMPH PF LTX 7 STRL